# Patient Record
Sex: MALE | Race: WHITE | NOT HISPANIC OR LATINO | Employment: FULL TIME | URBAN - METROPOLITAN AREA
[De-identification: names, ages, dates, MRNs, and addresses within clinical notes are randomized per-mention and may not be internally consistent; named-entity substitution may affect disease eponyms.]

---

## 2022-12-01 ENCOUNTER — APPOINTMENT (EMERGENCY)
Dept: CT IMAGING | Facility: HOSPITAL | Age: 52
End: 2022-12-01

## 2022-12-01 ENCOUNTER — APPOINTMENT (EMERGENCY)
Dept: RADIOLOGY | Facility: HOSPITAL | Age: 52
End: 2022-12-01

## 2022-12-01 ENCOUNTER — HOSPITAL ENCOUNTER (INPATIENT)
Facility: HOSPITAL | Age: 52
LOS: 15 days | Discharge: HOME | End: 2022-12-16
Attending: EMERGENCY MEDICINE | Admitting: ANESTHESIOLOGY

## 2022-12-01 DIAGNOSIS — U07.1 ACUTE RESPIRATORY FAILURE DUE TO COVID-19 (HCC): ICD-10-CM

## 2022-12-01 DIAGNOSIS — J96.00 ACUTE RESPIRATORY FAILURE DUE TO COVID-19 (HCC): ICD-10-CM

## 2022-12-01 DIAGNOSIS — I27.20 PULMONARY HYPERTENSION (HCC): ICD-10-CM

## 2022-12-01 DIAGNOSIS — R09.02 HYPOXIA: Primary | ICD-10-CM

## 2022-12-01 DIAGNOSIS — R06.89 HYPERCAPNIA: ICD-10-CM

## 2022-12-01 DIAGNOSIS — I48.91 A-FIB (HCC): ICD-10-CM

## 2022-12-01 DIAGNOSIS — G47.33 OBSTRUCTIVE SLEEP APNEA: ICD-10-CM

## 2022-12-01 DIAGNOSIS — U07.1 COVID: ICD-10-CM

## 2022-12-01 PROBLEM — I50.9 CHF (CONGESTIVE HEART FAILURE) (HCC): Status: ACTIVE | Noted: 2022-12-01

## 2022-12-01 PROBLEM — J44.9 COPD MIXED TYPE (HCC): Status: ACTIVE | Noted: 2022-11-07

## 2022-12-01 PROBLEM — I10 HYPERTENSION, ESSENTIAL: Status: ACTIVE | Noted: 2017-08-20

## 2022-12-01 PROBLEM — R73.01 IMPAIRED FASTING GLUCOSE: Status: ACTIVE | Noted: 2020-06-24

## 2022-12-01 PROBLEM — E78.2 MIXED HYPERLIPIDEMIA: Status: ACTIVE | Noted: 2017-08-20

## 2022-12-01 PROBLEM — I51.7 LEFT VENTRICULAR HYPERTROPHY: Status: ACTIVE | Noted: 2021-12-20

## 2022-12-01 PROBLEM — R79.89 ELEVATED SERUM CREATININE: Status: ACTIVE | Noted: 2022-12-01

## 2022-12-01 LAB
2HR DELTA HS TROPONIN: 1 NG/L
4HR DELTA HS TROPONIN: 5 NG/L
ALBUMIN SERPL BCP-MCNC: 3.2 G/DL (ref 3.5–5)
ALP SERPL-CCNC: 68 U/L (ref 46–116)
ALT SERPL W P-5'-P-CCNC: 36 U/L (ref 12–78)
ANION GAP SERPL CALCULATED.3IONS-SCNC: 7 MMOL/L (ref 4–13)
APTT PPP: 22 SECONDS (ref 23–37)
AST SERPL W P-5'-P-CCNC: 24 U/L (ref 5–45)
BASOPHILS # BLD AUTO: 0.04 THOUSANDS/ÂΜL (ref 0–0.1)
BASOPHILS NFR BLD AUTO: 0 % (ref 0–1)
BILIRUB SERPL-MCNC: 0.57 MG/DL (ref 0.2–1)
BUN SERPL-MCNC: 37 MG/DL (ref 5–25)
CALCIUM ALBUM COR SERPL-MCNC: 9.3 MG/DL (ref 8.3–10.1)
CALCIUM SERPL-MCNC: 8.7 MG/DL (ref 8.3–10.1)
CARDIAC TROPONIN I PNL SERPL HS: 18 NG/L
CARDIAC TROPONIN I PNL SERPL HS: 19 NG/L
CARDIAC TROPONIN I PNL SERPL HS: 23 NG/L
CHLORIDE SERPL-SCNC: 105 MMOL/L (ref 96–108)
CO2 SERPL-SCNC: 33 MMOL/L (ref 21–32)
CREAT SERPL-MCNC: 1.39 MG/DL (ref 0.6–1.3)
D DIMER PPP FEU-MCNC: 3.5 UG/ML FEU
EOSINOPHIL # BLD AUTO: 0.2 THOUSAND/ÂΜL (ref 0–0.61)
EOSINOPHIL NFR BLD AUTO: 2 % (ref 0–6)
ERYTHROCYTE [DISTWIDTH] IN BLOOD BY AUTOMATED COUNT: 17.7 % (ref 11.6–15.1)
FLUAV RNA RESP QL NAA+PROBE: NEGATIVE
FLUBV RNA RESP QL NAA+PROBE: NEGATIVE
GFR SERPL CREATININE-BSD FRML MDRD: 57 ML/MIN/1.73SQ M
GLUCOSE SERPL-MCNC: 113 MG/DL (ref 65–140)
HCT VFR BLD AUTO: 52.3 % (ref 36.5–49.3)
HGB BLD-MCNC: 15.2 G/DL (ref 12–17)
IMM GRANULOCYTES # BLD AUTO: 0.02 THOUSAND/UL (ref 0–0.2)
IMM GRANULOCYTES NFR BLD AUTO: 0 % (ref 0–2)
INR PPP: 1.18 (ref 0.84–1.19)
LYMPHOCYTES # BLD AUTO: 2.01 THOUSANDS/ÂΜL (ref 0.6–4.47)
LYMPHOCYTES NFR BLD AUTO: 21 % (ref 14–44)
MCH RBC QN AUTO: 25.4 PG (ref 26.8–34.3)
MCHC RBC AUTO-ENTMCNC: 29.1 G/DL (ref 31.4–37.4)
MCV RBC AUTO: 87 FL (ref 82–98)
MONOCYTES # BLD AUTO: 1.12 THOUSAND/ÂΜL (ref 0.17–1.22)
MONOCYTES NFR BLD AUTO: 12 % (ref 4–12)
NEUTROPHILS # BLD AUTO: 6.11 THOUSANDS/ÂΜL (ref 1.85–7.62)
NEUTS SEG NFR BLD AUTO: 65 % (ref 43–75)
NRBC BLD AUTO-RTO: 0 /100 WBCS
PLATELET # BLD AUTO: 269 THOUSANDS/UL (ref 149–390)
PMV BLD AUTO: 9.5 FL (ref 8.9–12.7)
POTASSIUM SERPL-SCNC: 4.5 MMOL/L (ref 3.5–5.3)
PROT SERPL-MCNC: 7.1 G/DL (ref 6.4–8.4)
PROTHROMBIN TIME: 14.7 SECONDS (ref 11.6–14.5)
RBC # BLD AUTO: 5.99 MILLION/UL (ref 3.88–5.62)
RSV RNA RESP QL NAA+PROBE: NEGATIVE
SARS-COV-2 RNA RESP QL NAA+PROBE: POSITIVE
SODIUM SERPL-SCNC: 145 MMOL/L (ref 135–147)
WBC # BLD AUTO: 9.5 THOUSAND/UL (ref 4.31–10.16)

## 2022-12-01 PROCEDURE — XW0DXM6 INTRODUCTION OF BARICITINIB INTO MOUTH AND PHARYNX, EXTERNAL APPROACH, NEW TECHNOLOGY GROUP 6: ICD-10-PCS | Performed by: ANESTHESIOLOGY

## 2022-12-01 PROCEDURE — XW033E5 INTRODUCTION OF REMDESIVIR ANTI-INFECTIVE INTO PERIPHERAL VEIN, PERCUTANEOUS APPROACH, NEW TECHNOLOGY GROUP 5: ICD-10-PCS | Performed by: ANESTHESIOLOGY

## 2022-12-01 RX ORDER — ACETAMINOPHEN 325 MG/1
650 TABLET ORAL EVERY 6 HOURS PRN
Status: DISCONTINUED | OUTPATIENT
Start: 2022-12-01 | End: 2022-12-16 | Stop reason: HOSPADM

## 2022-12-01 RX ORDER — ALBUTEROL SULFATE 90 UG/1
2 AEROSOL, METERED RESPIRATORY (INHALATION) EVERY 4 HOURS PRN
Status: DISCONTINUED | OUTPATIENT
Start: 2022-12-01 | End: 2022-12-16 | Stop reason: HOSPADM

## 2022-12-01 RX ORDER — FUROSEMIDE 10 MG/ML
40 INJECTION INTRAMUSCULAR; INTRAVENOUS ONCE
Status: COMPLETED | OUTPATIENT
Start: 2022-12-01 | End: 2022-12-01

## 2022-12-01 RX ORDER — METOPROLOL SUCCINATE 25 MG/1
25 TABLET, EXTENDED RELEASE ORAL
Status: DISCONTINUED | OUTPATIENT
Start: 2022-12-02 | End: 2022-12-02

## 2022-12-01 RX ORDER — ALBUTEROL SULFATE 2.5 MG/3ML
5 SOLUTION RESPIRATORY (INHALATION) ONCE
Status: COMPLETED | OUTPATIENT
Start: 2022-12-01 | End: 2022-12-01

## 2022-12-01 RX ORDER — SILDENAFIL CITRATE 20 MG/1
20 TABLET ORAL 3 TIMES DAILY
Status: DISCONTINUED | OUTPATIENT
Start: 2022-12-02 | End: 2022-12-16 | Stop reason: HOSPADM

## 2022-12-01 RX ORDER — HYDRALAZINE HYDROCHLORIDE 20 MG/ML
5 INJECTION INTRAMUSCULAR; INTRAVENOUS EVERY 6 HOURS PRN
Status: DISCONTINUED | OUTPATIENT
Start: 2022-12-01 | End: 2022-12-01

## 2022-12-01 RX ORDER — GUAIFENESIN/DEXTROMETHORPHAN 100-10MG/5
10 SYRUP ORAL EVERY 4 HOURS PRN
Status: DISCONTINUED | OUTPATIENT
Start: 2022-12-01 | End: 2022-12-16 | Stop reason: HOSPADM

## 2022-12-01 RX ORDER — SILDENAFIL CITRATE 20 MG/1
20 TABLET ORAL 3 TIMES DAILY
COMMUNITY
Start: 2022-08-30 | End: 2023-08-30

## 2022-12-01 RX ORDER — PRAVASTATIN SODIUM 40 MG
40 TABLET ORAL
Status: DISCONTINUED | OUTPATIENT
Start: 2022-12-02 | End: 2022-12-02 | Stop reason: CLARIF

## 2022-12-01 RX ORDER — DEXAMETHASONE SODIUM PHOSPHATE 4 MG/ML
6 INJECTION, SOLUTION INTRA-ARTICULAR; INTRALESIONAL; INTRAMUSCULAR; INTRAVENOUS; SOFT TISSUE EVERY 24 HOURS
Status: DISCONTINUED | OUTPATIENT
Start: 2022-12-02 | End: 2022-12-02

## 2022-12-01 RX ORDER — LISINOPRIL AND HYDROCHLOROTHIAZIDE 25; 20 MG/1; MG/1
1 TABLET ORAL EVERY MORNING
COMMUNITY
Start: 2022-08-24 | End: 2022-12-16

## 2022-12-01 RX ORDER — FAMOTIDINE 40 MG/1
40 TABLET, FILM COATED ORAL
COMMUNITY
Start: 2022-08-22

## 2022-12-01 RX ORDER — ROSUVASTATIN CALCIUM 5 MG/1
5 TABLET, COATED ORAL DAILY
COMMUNITY
Start: 2022-08-24 | End: 2023-08-24

## 2022-12-01 RX ORDER — METOPROLOL SUCCINATE 25 MG/1
25 TABLET, EXTENDED RELEASE ORAL
COMMUNITY
Start: 2022-08-24 | End: 2022-12-16

## 2022-12-01 RX ORDER — FUROSEMIDE 20 MG/1
20 TABLET ORAL
COMMUNITY
Start: 2022-08-30 | End: 2022-12-16

## 2022-12-01 RX ORDER — MAGNESIUM HYDROXIDE/ALUMINUM HYDROXICE/SIMETHICONE 120; 1200; 1200 MG/30ML; MG/30ML; MG/30ML
30 SUSPENSION ORAL EVERY 6 HOURS PRN
Status: DISCONTINUED | OUTPATIENT
Start: 2022-12-01 | End: 2022-12-02

## 2022-12-01 RX ORDER — POTASSIUM CHLORIDE 750 MG/1
10 TABLET, FILM COATED, EXTENDED RELEASE ORAL DAILY
COMMUNITY
Start: 2022-08-22 | End: 2023-08-22

## 2022-12-01 RX ORDER — FUROSEMIDE 10 MG/ML
20 INJECTION INTRAMUSCULAR; INTRAVENOUS DAILY
Status: DISCONTINUED | OUTPATIENT
Start: 2022-12-02 | End: 2022-12-02

## 2022-12-01 RX ADMIN — IOHEXOL 100 ML: 350 INJECTION, SOLUTION INTRAVENOUS at 19:23

## 2022-12-01 RX ADMIN — ALBUTEROL SULFATE 5 MG: 2.5 SOLUTION RESPIRATORY (INHALATION) at 18:23

## 2022-12-01 RX ADMIN — FUROSEMIDE 40 MG: 10 INJECTION, SOLUTION INTRAMUSCULAR; INTRAVENOUS at 22:35

## 2022-12-01 RX ADMIN — IPRATROPIUM BROMIDE 0.5 MG: 0.5 SOLUTION RESPIRATORY (INHALATION) at 18:23

## 2022-12-01 RX ADMIN — SODIUM CHLORIDE 500 ML: 0.9 INJECTION, SOLUTION INTRAVENOUS at 18:24

## 2022-12-02 ENCOUNTER — APPOINTMENT (INPATIENT)
Dept: NON INVASIVE DIAGNOSTICS | Facility: HOSPITAL | Age: 52
End: 2022-12-02

## 2022-12-02 LAB
ANION GAP SERPL CALCULATED.3IONS-SCNC: 7 MMOL/L (ref 4–13)
APTT PPP: 28 SECONDS (ref 23–37)
APTT PPP: 32 SECONDS (ref 23–37)
APTT PPP: 36 SECONDS (ref 23–37)
ARTERIAL PATENCY WRIST A: YES
ATRIAL RATE: 127 BPM
ATRIAL RATE: 135 BPM
ATRIAL RATE: 136 BPM
ATRIAL RATE: 151 BPM
BASE EXCESS BLDA CALC-SCNC: 3.1 MMOL/L
BUN SERPL-MCNC: 33 MG/DL (ref 5–25)
CALCIUM SERPL-MCNC: 8.6 MG/DL (ref 8.3–10.1)
CHLORIDE SERPL-SCNC: 106 MMOL/L (ref 96–108)
CK SERPL-CCNC: 67 U/L (ref 39–308)
CO2 SERPL-SCNC: 33 MMOL/L (ref 21–32)
CREAT SERPL-MCNC: 1.48 MG/DL (ref 0.6–1.3)
CRP SERPL QL: 20.2 MG/L
E WAVE DECELERATION TIME: 49 MS
ERYTHROCYTE [DISTWIDTH] IN BLOOD BY AUTOMATED COUNT: 17.7 % (ref 11.6–15.1)
GFR SERPL CREATININE-BSD FRML MDRD: 53 ML/MIN/1.73SQ M
GLUCOSE SERPL-MCNC: 129 MG/DL (ref 65–140)
HCO3 BLDA-SCNC: 36 MMOL/L (ref 22–28)
HCT VFR BLD AUTO: 51.5 % (ref 36.5–49.3)
HGB BLD-MCNC: 14.4 G/DL (ref 12–17)
LAAS-AP4: 25.7 CM2
MAGNESIUM SERPL-MCNC: 2.2 MG/DL (ref 1.6–2.6)
MCH RBC QN AUTO: 24.6 PG (ref 26.8–34.3)
MCHC RBC AUTO-ENTMCNC: 28 G/DL (ref 31.4–37.4)
MCV RBC AUTO: 88 FL (ref 82–98)
MV PEAK A VEL: 1.16 M/S
MV PEAK E VEL: 92 CM/S
MV STENOSIS PRESSURE HALF TIME: 14 MS
MV VALVE AREA P 1/2 METHOD: 15.71 CM2
NASAL CANNULA: 15
NT-PROBNP SERPL-MCNC: 2061 PG/ML
O2 CT BLDA-SCNC: 20 ML/DL (ref 16–23)
OXYHGB MFR BLDA: 94.1 % (ref 94–97)
PCO2 BLDA: 105.1 MM HG (ref 36–44)
PH BLDA: 7.15 [PH] (ref 7.35–7.45)
PLATELET # BLD AUTO: 232 THOUSANDS/UL (ref 149–390)
PMV BLD AUTO: 9.1 FL (ref 8.9–12.7)
PO2 BLDA: 92.1 MM HG (ref 75–129)
POTASSIUM SERPL-SCNC: 5 MMOL/L (ref 3.5–5.3)
PR INTERVAL: 120 MS
PROCALCITONIN SERPL-MCNC: 0.11 NG/ML
QRS AXIS: -65 DEGREES
QRS AXIS: 172 DEGREES
QRS AXIS: 191 DEGREES
QRS AXIS: 198 DEGREES
QRSD INTERVAL: 142 MS
QRSD INTERVAL: 152 MS
QRSD INTERVAL: 152 MS
QRSD INTERVAL: 158 MS
QT INTERVAL: 318 MS
QT INTERVAL: 320 MS
QT INTERVAL: 326 MS
QT INTERVAL: 334 MS
QTC INTERVAL: 480 MS
QTC INTERVAL: 489 MS
QTC INTERVAL: 498 MS
QTC INTERVAL: 504 MS
RBC # BLD AUTO: 5.86 MILLION/UL (ref 3.88–5.62)
SODIUM SERPL-SCNC: 146 MMOL/L (ref 135–147)
SPECIMEN SOURCE: ABNORMAL
T WAVE AXIS: -30 DEGREES
T WAVE AXIS: -31 DEGREES
T WAVE AXIS: 43 DEGREES
T WAVE AXIS: 89 DEGREES
TR MAX PG: 41 MMHG
TR PEAK VELOCITY: 3.2 M/S
TRICUSPID VALVE PEAK REGURGITATION VELOCITY: 3.2 M/S
VENTRICULAR RATE: 134 BPM
VENTRICULAR RATE: 135 BPM
VENTRICULAR RATE: 135 BPM
VENTRICULAR RATE: 151 BPM
WBC # BLD AUTO: 10.64 THOUSAND/UL (ref 4.31–10.16)

## 2022-12-02 RX ORDER — HEPARIN SODIUM 1000 [USP'U]/ML
4000 INJECTION, SOLUTION INTRAVENOUS; SUBCUTANEOUS
Status: DISCONTINUED | OUTPATIENT
Start: 2022-12-02 | End: 2022-12-03

## 2022-12-02 RX ORDER — METOPROLOL TARTRATE 50 MG/1
50 TABLET, FILM COATED ORAL EVERY 12 HOURS SCHEDULED
Status: DISCONTINUED | OUTPATIENT
Start: 2022-12-02 | End: 2022-12-02

## 2022-12-02 RX ORDER — HEPARIN SODIUM 1000 [USP'U]/ML
2000 INJECTION, SOLUTION INTRAVENOUS; SUBCUTANEOUS
Status: DISCONTINUED | OUTPATIENT
Start: 2022-12-02 | End: 2022-12-03

## 2022-12-02 RX ORDER — HEPARIN SODIUM 1000 [USP'U]/ML
4000 INJECTION, SOLUTION INTRAVENOUS; SUBCUTANEOUS ONCE
Status: COMPLETED | OUTPATIENT
Start: 2022-12-02 | End: 2022-12-02

## 2022-12-02 RX ORDER — DOXYCYCLINE HYCLATE 100 MG/1
100 CAPSULE ORAL EVERY 12 HOURS SCHEDULED
Status: DISCONTINUED | OUTPATIENT
Start: 2022-12-02 | End: 2022-12-03

## 2022-12-02 RX ORDER — METOPROLOL TARTRATE 5 MG/5ML
10 INJECTION INTRAVENOUS ONCE
Status: COMPLETED | OUTPATIENT
Start: 2022-12-02 | End: 2022-12-02

## 2022-12-02 RX ORDER — METOPROLOL TARTRATE 50 MG/1
50 TABLET, FILM COATED ORAL EVERY 12 HOURS SCHEDULED
Status: DISCONTINUED | OUTPATIENT
Start: 2022-12-02 | End: 2022-12-03

## 2022-12-02 RX ORDER — DILTIAZEM HYDROCHLORIDE 5 MG/ML
15 INJECTION INTRAVENOUS ONCE
Status: DISCONTINUED | OUTPATIENT
Start: 2022-12-02 | End: 2022-12-02

## 2022-12-02 RX ORDER — ATORVASTATIN CALCIUM 10 MG/1
10 TABLET, FILM COATED ORAL
Status: DISCONTINUED | OUTPATIENT
Start: 2022-12-02 | End: 2022-12-16 | Stop reason: HOSPADM

## 2022-12-02 RX ORDER — FUROSEMIDE 10 MG/ML
40 INJECTION INTRAMUSCULAR; INTRAVENOUS
Status: DISCONTINUED | OUTPATIENT
Start: 2022-12-02 | End: 2022-12-05

## 2022-12-02 RX ORDER — HEPARIN SODIUM 10000 [USP'U]/100ML
3-30 INJECTION, SOLUTION INTRAVENOUS
Status: DISCONTINUED | OUTPATIENT
Start: 2022-12-02 | End: 2022-12-03

## 2022-12-02 RX ADMIN — FUROSEMIDE 20 MG: 10 INJECTION, SOLUTION INTRAMUSCULAR; INTRAVENOUS at 08:12

## 2022-12-02 RX ADMIN — METOROPROLOL TARTRATE 10 MG: 5 INJECTION, SOLUTION INTRAVENOUS at 17:07

## 2022-12-02 RX ADMIN — HEPARIN SODIUM 11.1 UNITS/KG/HR: 10000 INJECTION, SOLUTION INTRAVENOUS at 01:03

## 2022-12-02 RX ADMIN — HEPARIN SODIUM 4000 UNITS: 1000 INJECTION INTRAVENOUS; SUBCUTANEOUS at 01:03

## 2022-12-02 RX ADMIN — METOPROLOL SUCCINATE 25 MG: 25 TABLET, EXTENDED RELEASE ORAL at 00:49

## 2022-12-02 RX ADMIN — HEPARIN SODIUM 4000 UNITS: 1000 INJECTION INTRAVENOUS; SUBCUTANEOUS at 06:07

## 2022-12-02 RX ADMIN — HEPARIN SODIUM 4000 UNITS: 1000 INJECTION INTRAVENOUS; SUBCUTANEOUS at 14:22

## 2022-12-02 RX ADMIN — GUAIFENESIN AND DEXTROMETHORPHAN 10 ML: 100; 10 SYRUP ORAL at 17:24

## 2022-12-02 RX ADMIN — REMDESIVIR 200 MG: 100 INJECTION, POWDER, LYOPHILIZED, FOR SOLUTION INTRAVENOUS at 00:41

## 2022-12-02 RX ADMIN — DEXAMETHASONE SODIUM PHOSPHATE 6 MG: 4 INJECTION, SOLUTION INTRAMUSCULAR; INTRAVENOUS at 00:46

## 2022-12-02 RX ADMIN — ATORVASTATIN CALCIUM 10 MG: 10 TABLET, FILM COATED ORAL at 17:24

## 2022-12-02 RX ADMIN — METOPROLOL TARTRATE 50 MG: 50 TABLET, FILM COATED ORAL at 17:07

## 2022-12-02 RX ADMIN — CEFTRIAXONE SODIUM 1000 MG: 10 INJECTION, POWDER, FOR SOLUTION INTRAVENOUS at 08:11

## 2022-12-02 RX ADMIN — PERFLUTREN 0.9 ML/MIN: 6.52 INJECTION, SUSPENSION INTRAVENOUS at 15:00

## 2022-12-02 RX ADMIN — SILDENAFIL 20 MG: 20 TABLET ORAL at 00:49

## 2022-12-02 RX ADMIN — FUROSEMIDE 40 MG: 10 INJECTION, SOLUTION INTRAMUSCULAR; INTRAVENOUS at 17:24

## 2022-12-02 RX ADMIN — BARICITINIB 2 MG: 2 TABLET, FILM COATED ORAL at 01:08

## 2022-12-02 RX ADMIN — SILDENAFIL 20 MG: 20 TABLET ORAL at 17:41

## 2022-12-02 RX ADMIN — DEXAMETHASONE SODIUM PHOSPHATE 18.4 MG: 10 INJECTION, SOLUTION INTRAMUSCULAR; INTRAVENOUS at 23:40

## 2022-12-02 RX ADMIN — HEPARIN SODIUM 19.1 UNITS/KG/HR: 10000 INJECTION, SOLUTION INTRAVENOUS at 19:31

## 2022-12-02 RX ADMIN — CEFTRIAXONE SODIUM 2000 MG: 10 INJECTION, POWDER, FOR SOLUTION INTRAVENOUS at 23:36

## 2022-12-02 NOTE — ED NOTES
Pt O2 saturation in the 80s via monitor, in to assess pt  Pt laying flat in bed sleeping  This RN woke pt and sat him up in bed, changed bed linens and resituated pt in bed into a comfortable position  Pt asking to shower, have his clothing cleaned and to get dressed  This RN advised pt that at another time when he is feeling stronger we can reassess for shower use, pt agreeable  Pt sitting in bed, in no signs of distress, O2 saturation mid 90s  Lights dimmed, curtain closed and call bell within reach, will continue to monitor         Missy Bence, RN  72/48/42 [id number]

## 2022-12-02 NOTE — ASSESSMENT & PLAN NOTE
· Pulmonary hypertension noted on CTA chest, patient with history  · Continue home sildenafil t i d

## 2022-12-02 NOTE — UTILIZATION REVIEW
NOTIFICATION OF INPATIENT ADMISSION   AUTHORIZATION REQUEST   SERVICING FACILITY:   [address] [place] [address]  Tax ID: [id number]  NPI: [id number] ATTENDING PROVIDER:  Attending Name and NPI#: René Oliva [[id number]]  Address: [address] [place] [address]  Phone: [phone]     ADMISSION INFORMATION:  Place of Service: Inpatient [facility]  Place of Service Code: 21  Inpatient Admission Date/Time: 12/1/22  9:46 PM  Discharge Date/Time: No discharge date for patient encounter  Admitting Diagnosis Code/Description:  SOB (shortness of breath) [R06 02]     UTILIZATION REVIEW CONTACT:  Sotero Jeans, Utilization   Network Utilization Review Department  Phone: [phone]  Fax [phone]  Email: [email or web address]  Contact for approvals/pending authorizations, clinical reviews, and discharge  PHYSICIAN ADVISORY SERVICES:  Medical Necessity Denial & Wkxu-iq-Vis[facility] Review  Phone: [phone]  Fax: [phone]  Email: [email or web address]

## 2022-12-02 NOTE — ASSESSMENT & PLAN NOTE
· Creatinine currently 1 39, baseline around 1 in setting of COVID-19 infection verses possibility of onset of cardiorenal syndrome with CHF history  · Monitor BMP, avoid nephrotoxic agents  · For now will hold home HCTZ/lisinopril

## 2022-12-02 NOTE — ASSESSMENT & PLAN NOTE
+ Noted O2 saturation 70% on room air, was on high flow, currently on 12 L O2 by nasal cannula  · COVID-19 PCR + 12/1/2022  · CT PE protocol consistent with cardiomegaly/right lower lobe pneumonia/pulmonary hypertension  No PE   · CRP 20 2 -><3  · ddimer 3 5 - >0 69  · S/p IV Decadron  · S/p remdesivir  · On baricitinib [day 12/14]  · Initially on heparin drip - > eliquis  · Procalcitonin x2 negative

## 2022-12-02 NOTE — H&P
[facility]  H&P- Julisa Barberton 1970, 46 y o  male MRN: [id number]  Unit/Bed#: ED 21 Encounter: [id number]  Primary Care Provider: No primary care provider on file  Date and time admitted to hospital: 12/1/2022  5:52 PM    * Acute respiratory failure due to COVID-19 [facility])  Assessment & Plan  · Noted O2 sat in 70s on room air, currently saturating 94% on 6 L nasal cannula, attempt to wean as tolerated  · Patient reports history of initial 2 dose vaccine and 1 booster  · Due to requiring 6 L nasal can will place on moderate COVID pathway  · Start IV dexamethasone 6 mg daily, IV remdesivir daily, p o  Baricitinib 2 mg daily, patient in agreement  · CT a negative for PE as D-dimer elevated at 3 50  · Based on COVID pathway will initiate on IV heparin drip with low ACS protocol  · Check sputum culture and Gram stain as CT a revealing atelectasis versus pneumonia, currently not meeting sepsis criteria so will hold off on antibiotic    CHF (congestive heart failure) ([facility] [address] )  Assessment & Plan  Wt Readings from Last 3 Encounters:   12/01/22 (!) 184 kg (406 lb)   · Patient denies any worsening lower extremity edema which is noted on exam, CTA chest revealing right lower lobe atelectasis versus pneumonia with small right pleural effusion and cardiomegaly  · Most recent echocardiogram from December 2021 at [facility] revealing 60-65% ejection fraction, normal systolic function  · Will recheck echocardiogram  · Hold home p o   Lasix 20 mg every other day  · Start IV Lasix 20 mg daily  · Intake and output monitoring, daily weights, low-sodium diet with 1500 mL fluid restriction          Elevated serum creatinine  Assessment & Plan  · Creatinine currently 1 39, baseline around 1 in setting of COVID-19 infection verses possibility of onset of cardiorenal syndrome with CHF history  · Monitor BMP, avoid nephrotoxic agents  · For now will hold home HCTZ/lisinopril    Pulmonary hypertension ([address] )  Assessment & Plan  · Pulmonary hypertension noted on CTA chest, patient with history  · Continue home sildenafil t i d       VTE Pharmacologic Prophylaxis: VTE Score: 8 High Risk (Score >/= 5) - Pharmacological DVT Prophylaxis Ordered: heparin drip  Sequential Compression Devices Ordered  Code Status: Level 1 - Full Code per pt  Discussion with family: pt  Anticipated Length of Stay: Patient will be admitted on an inpatient basis with an anticipated length of stay of greater than 2 midnights secondary to see above  Total Time for Visit, including Counseling / Coordination of Care: 60 minutes Greater than 50% of this total time spent on direct patient counseling and coordination of care  Chief Complaint:    Chief Complaint   Patient presents with   • Shortness of Breath     SOB x4 days pt to  today and covid rlue out and with ghulam pneumonia noted on chest xray  O2 sat on room air 72 and increased to low to mid 90's on nasal cannula       History of Present Illness:  Mekhi Simmons is a 46 y o  male with a PMH of COPD, morbid obesity, BISHNU, pulmonary hypertension, CHF who presents with complaint of gradual worsening of shortness of breath that patient reports has been going on “for long time for months ” However, he reports a brought to the hospital now is over the last couple of days shortness of breath has been worse and he was also noted to be COVID positive at urgent care today  Other than shortness of breath patient denies any other symptoms including chest pain, cough, congestion, dizziness, headache, abdominal pain, diarrhea  Patient reports his main concern is he would like to lose weight and speak to a bariatric surgeon  Encourage patient to follow up with PCP for referral upon discharge  Review of Systems:  Review of Systems   Constitutional: Negative for activity change, chills, fatigue and fever  HENT: Negative for congestion      Respiratory: Positive for shortness of breath  Negative for cough  Cardiovascular: Negative for chest pain  Gastrointestinal: Negative for abdominal pain, diarrhea and nausea  Neurological: Negative for dizziness and headaches  Past Medical and Surgical History:   Past Medical History:   Diagnosis Date   • CHF (congestive heart failure) ([facility] )    • Hypertension    • Sleep apnea        History reviewed  No pertinent surgical history  Meds/Allergies:  Prior to Admission medications    Medication Sig Start Date End Date Taking? Authorizing Provider   famotidine (PEPCID) 40 MG tablet Take 40 mg by mouth 8/22/22  Yes Historical Provider, MD   furosemide (LASIX) 20 mg tablet Take 20 mg by mouth 8/30/22 8/30/23 Yes Historical Provider, MD   lisinopril-hydrochlorothiazide (PRINZIDE,ZESTORETIC) 20-25 MG per tablet Take 1 tablet by mouth every morning 8/24/22 8/24/23 Yes Historical Provider, MD   metoprolol succinate (TOPROL-XL) 25 mg 24 hr tablet Take 25 mg by mouth 8/24/22 8/24/23 Yes Historical Provider, MD   potassium chloride (Klor-Con) 10 mEq tablet Take 10 mEq by mouth daily 8/22/22 8/22/23 Yes Historical Provider, MD   rosuvastatin (CRESTOR) 5 mg tablet Take 5 mg by mouth daily 8/24/22 8/24/23 Yes Historical Provider, MD   sildenafil (REVATIO) 20 mg tablet Take 20 mg by mouth Three times a day 8/30/22 8/30/23 Yes Historical Provider, MD     Have reviewed home medications per review of chart       Allergies: No Known Allergies    Social History:  Marital Status: /Civil Union     Patient Pre-hospital Living Situation: Home  Patient Pre-hospital Level of Mobility: walks  Patient Pre-hospital Diet Restrictions: cardiac  Substance Use History:   Social History     Substance and Sexual Activity   Alcohol Use Not Currently     Social History     Tobacco Use   Smoking Status Former   • Types: Cigarettes   Smokeless Tobacco Never     Social History     Substance and Sexual Activity   Drug Use Not Currently       Family History:  History reviewed  No pertinent family history  Physical Exam:     Vitals:   Blood Pressure: 131/94 (12/01/22 2330)  Pulse: (!) 135 (12/01/22 2330)  Temperature: 99 °F (37 2 °C) (12/01/22 1845)  Temp Source: Oral (12/01/22 1845)  Respirations: 18 (12/01/22 2330)  Height: 5' 11" (180 3 cm) (12/01/22 1800)  Weight - Scale: (!) 184 kg (406 lb) (12/01/22 1800)  SpO2: 90 % (12/01/22 2330)    Physical Exam  Vitals and nursing note reviewed  Constitutional:       General: He is not in acute distress  Appearance: He is obese  He is not diaphoretic  HENT:      Head: Normocephalic  Cardiovascular:      Rate and Rhythm: Regular rhythm  Tachycardia present  Heart sounds: Normal heart sounds  Pulmonary:      Effort: Pulmonary effort is normal  No respiratory distress  Breath sounds: No stridor  Examination of the right-lower field reveals decreased breath sounds  Examination of the left-lower field reveals decreased breath sounds  Decreased breath sounds present  No wheezing or rales  Abdominal:      General: Abdomen is flat  Bowel sounds are normal       Palpations: Abdomen is soft  Tenderness: There is no abdominal tenderness  Musculoskeletal:         General: No tenderness  Right lower leg: Edema present  Left lower leg: Edema present  Skin:     General: Skin is warm and dry  Neurological:      General: No focal deficit present  Mental Status: He is alert and oriented to person, place, and time     Psychiatric:         Mood and Affect: Mood normal          Behavior: Behavior normal           Additional Data:     Lab Results:  Results from last 7 days   Lab Units 12/01/22  1822   WBC Thousand/uL 9 50   HEMOGLOBIN g/dL 15 2   HEMATOCRIT % 52 3*   PLATELETS Thousands/uL 269   NEUTROS PCT % 65   LYMPHS PCT % 21   MONOS PCT % 12   EOS PCT % 2     Results from last 7 days   Lab Units 12/01/22  1822   SODIUM mmol/L 145   POTASSIUM mmol/L 4 5   CHLORIDE mmol/L 105   CO2 mmol/L 33*   BUN mg/dL 37*   CREATININE mg/dL 1 39*   ANION GAP mmol/L 7   CALCIUM mg/dL 8 7   ALBUMIN g/dL 3 2*   TOTAL BILIRUBIN mg/dL 0 57   ALK PHOS U/L 68   ALT U/L 36   AST U/L 24   GLUCOSE RANDOM mg/dL 113     Results from last 7 days   Lab Units 12/01/22  2238   INR  1 18                   Lines/Drains:  Invasive Devices     Peripheral Intravenous Line  Duration           Peripheral IV 12/01/22 Left Antecubital <1 day                    Imaging: Reviewed radiology reports from this admission including: chest CT scan  CTA ED chest PE Study   Final Result by Tamy Mendez MD (12/01 2118)         1  Mild to moderate cardiomegaly with small right pleural effusion  2  Enlarged pulmonary artery suggesting pulmonary hypertension  3  Subsegmental atelectasis versus pneumonia right lower lobe  4  No evidence of pulmonary embolism given limitations  5  Small perihepatic ascites  Workstation performed: [id number]         XR chest 1 view portable    (Results Pending)       EKG and Other Studies Reviewed on Admission:   · EKG: Wide QRS tachycardia, right bundle branch block, prolonged QTC four hundred ninety-eight  ** Please Note: This note has been constructed using a voice recognition system   **

## 2022-12-02 NOTE — RESPIRATORY THERAPY NOTE
12/02/22 1411   Respiratory Assessment   Resp Comments Pt remains on [address] [facility] at this time  No changes made  Will wean when able     O2 Device MFNC   Oxygen Therapy/Pulse Ox   O2 Device Mid flow nasal cannula   O2 Therapy Oxygen humidified   SpO2 94 %   SpO2 Activity At Rest   $ Pulse Oximetry Spot Check Charge Completed

## 2022-12-02 NOTE — ED NOTES
SLIM notified of pts oxygen saturation and O2 requirements  Respiratory called to bedside to reevaluate pt per WILLY Zheng RN  44/27/14 [id number]

## 2022-12-02 NOTE — RESPIRATORY THERAPY NOTE
RT Protocol Note  Charu Kirk 46 y o  male MRN: [id number]  Unit/Bed#: ED 21 Encounter: [id number]    Assessment    Principal Problem:    Acute respiratory failure due to COVID-19 [facility])  Active Problems:    CHF (congestive heart failure) ([address] )    Pulmonary hypertension (HCC)    Elevated serum creatinine      Home Pulmonary Medications:     12/02/22 0008   Respiratory Assessment   Assessment Type Assess only   General Appearance Alert; Awake   Respiratory Pattern Labored   Chest Assessment Chest expansion symmetrical   Bilateral Breath Sounds Diminished   Cough Non-productive   Resp Comments Pt placed on [address] at this time   O2 Device MFNC   Oxygen Therapy/Pulse Ox   O2 Device Mid flow nasal cannula   O2 Therapy Oxygen humidified   Nasal Cannula O2 Flow Rate (L/min) 8 L/min   Calculated FIO2 (%) - Nasal Cannula 52   FiO2 (%) 52   O2 Flow Rate (L/min) 8 L/min   Safety Instructions Yes (Comment)   SpO2 92 %   SpO2 Activity At Rest   $ Pulse Oximetry Spot Check Charge Completed          Past Medical History:   Diagnosis Date    CHF (congestive heart failure) (HCC)     Hypertension     Sleep apnea      Social History     Socioeconomic History    Marital status: /Civil Union     Spouse name: None    Number of children: None    Years of education: None    Highest education level: None   Occupational History    None   Tobacco Use    Smoking status: Former     Types: Cigarettes    Smokeless tobacco: Never   Substance and Sexual Activity    Alcohol use: Not Currently    Drug use: Not Currently    Sexual activity: None   Other Topics Concern    None   Social History Narrative    None     Social Determinants of Health     Financial Resource Strain: Not on file   Food Insecurity: Not on file   Transportation Needs: Not on file   Physical Activity: Not on file   Stress: Not on file   Social Connections: Not on file   Intimate Partner Violence: Not on file   Housing Stability: Not on file       Subjective Objective    Physical Exam:   Assessment Type: Assess only  General Appearance: Alert, Awake  Respiratory Pattern: Labored  Chest Assessment: Chest expansion symmetrical  Bilateral Breath Sounds: Diminished  Cough: Non-productive  O2 Device: [address]    Vitals:  Blood pressure 131/94, pulse (!) 135, temperature 99 °F (37 2 °C), temperature source Oral, resp  rate 18, height 5' 11" (1 803 m), weight (!) 184 kg (406 lb), SpO2 92 %  Imaging and other studies: I have personally reviewed pertinent reports        O2 Device: [address]     Plan             Resp Comments: Pt placed on [address] at this time

## 2022-12-02 NOTE — RESPIRATORY THERAPY NOTE
RT Protocol Note  Mira Cazares 46 y o  male MRN: [id number]  Unit/Bed#: ED 21 Encounter: [id number]    Assessment    Principal Problem:    Acute respiratory failure due to COVID-19 [facility])  Active Problems:    CHF (congestive heart failure) (HCC)    Pulmonary hypertension (HCC)    Elevated serum creatinine      Home Pulmonary Medications:  None    Home Devices/Therapy: (P) BiPAP/CPAP, Home O2    Past Medical History:   Diagnosis Date    CHF (congestive heart failure) (HCC)     Hypertension     Sleep apnea      Social History     Socioeconomic History    Marital status: /Civil Union     Spouse name: None    Number of children: None    Years of education: None    Highest education level: None   Occupational History    None   Tobacco Use    Smoking status: Former     Types: Cigarettes    Smokeless tobacco: Never   Substance and Sexual Activity    Alcohol use: Not Currently    Drug use: Not Currently    Sexual activity: None   Other Topics Concern    None   Social History Narrative    None     Social Determinants of Health     Financial Resource Strain: Not on file   Food Insecurity: Not on file   Transportation Needs: Not on file   Physical Activity: Not on file   Stress: Not on file   Social Connections: Not on file   Intimate Partner Violence: Not on file   Housing Stability: Not on file       Subjective         Objective    Physical Exam:   Assessment Type: Assess only  General Appearance: Alert, Awake  Respiratory Pattern: Labored, Tachypneic  Chest Assessment: Chest expansion symmetrical  Bilateral Breath Sounds: Diminished  Cough: None  O2 Device: [address]    Vitals:  Blood pressure 132/90, pulse (!) 134, temperature 99 °F (37 2 °C), temperature source Oral, resp  rate (P) 20, height 5' 11" (1 803 m), weight (!) 184 kg (406 lb), SpO2 (P) 92 %  Imaging and other studies: I have personally reviewed pertinent reports        O2 Device: [address]     Plan    Respiratory Plan: (P) No distress/Pulmonary history        Resp Comments: Pt remains on [address] [facility]  No changes made at this time  Will wean as able  Pt states he has a CPAP and O2 at home  Uses no inhalers or nebs

## 2022-12-02 NOTE — UTILIZATION REVIEW
Initial Clinical Review    Admission: Date/Time/Statement:   Admission Orders (From admission, onward)     Ordered        12/01/22 2146  INPATIENT ADMISSION  Once                      Orders Placed This Encounter   Procedures   • INPATIENT ADMISSION     Standing Status:   Standing     Number of Occurrences:   1     Order Specific Question:   Level of Care     Answer:   Med Surg [16]     Order Specific Question:   Estimated length of stay     Answer:   More than 2 Midnights     Order Specific Question:   Certification     Answer:   I certify that inpatient services are medically necessary for this patient for a duration of greater than two midnights  See H&P and MD Progress Notes for additional information about the patient's course of treatment  ED Arrival Information     Expected   -    Arrival   12/1/2022 17:52    Acuity   Emergent            Means of arrival   Ambulance    Escorted by   [facility]    Admission type   Emergency            Arrival complaint   sob           Chief Complaint   Patient presents with   • Shortness of Breath     SOB x4 days pt to [facility] today and covid rlue out and with ghulam pneumonia noted on chest xray  O2 sat on room air 72 and increased to low to mid 90's on nasal cannula       Initial Presentation: 46 y o  male  To ED from home via EMS w/ gradual worsening of shortness of breath that patient reports has been going on “for long time for months ”  SOB worse over the last few days   O2 sat 70s on RA , O2 94 % on 5l attempt to wean as porter   In ED tested + COVID   Admitted IP status for acute resp failure d/t COVID   Plan to Start iv dexamethasone , iv remdesivir and baricitinib   D-dimer elevated 3 50   Check sputum cx and gram statin   CHF check echo , hold home lasix , start IV lasix , I&O , weights, low Na diet , 1500 ml fld restriction   CR 1 36 , baseline 1 monitor BMP , hold HCTZ /lisinoprill        PE: BLE edema , dec breath sounds     Date: 12/2 Day 2: + tachypneic , BS diminished   Cont to have tachycardia   On 15 l Midflow to keep O2 sat >92-94 %   Cont IV abx , IV lasix and heparin gtt        ED Triage Vitals   Temperature Pulse Respirations Blood Pressure SpO2   12/01/22 1800 12/01/22 1800 12/01/22 1800 12/01/22 1800 12/01/22 1800   98 7 °F (37 1 °C) (!) 150 18 (!) 156/116 96 %      Temp Source Heart Rate Source Patient Position - Orthostatic VS BP Location FiO2 (%)   12/01/22 1800 12/01/22 1800 12/01/22 1800 12/01/22 1800 12/02/22 0008   Oral Monitor Sitting Right arm 52      Pain Score       --                 Wt Readings from Last 1 Encounters:   12/01/22 (!) 184 kg (406 lb)     Additional Vital Signs:   12/02/22 0818 -- 134 Abnormal  20 -- -- 92 % -- 80 -- 15 L/min Mid flow nasal cannula --   12/02/22 0807 -- 130 Abnormal  20 132/90 107 92 % -- -- -- -- None (Room air) Lying   12/02/22 0630 -- 132 Abnormal  -- -- -- 94 % -- -- -- -- Mid flow nasal cannula --   12/02/22 0200 -- 132 Abnormal  22 109/81 88 90 % 60 60 10 L/min 10 L/min Mid flow nasal cannula Lying   12/02/22 0145 -- 133 Abnormal  24 Abnormal  135/86 103 94 % -- -- -- -- Mid flow nasal cannula --   12/02/22 0130 -- 133 Abnormal  31 Abnormal  -- -- 91 % -- -- -- -- -- --   12/02/22 0100 -- 133 Abnormal  26 Abnormal  148/107 Abnormal  123 96 % -- -- -- -- None (Room air) Lying   12/02/22 0049 -- 135 Abnormal  -- 142/98 -- -- -- -- -- -- -- --   12/02/22 0030 -- 167 Abnormal  22 142/98 114 92 % -- -- -- -- Mid flow nasal cannula --   12/02/22 0008 -- -- -- -- -- 92 % 52 52 8 L/min 8 L/min Mid flow nasal cannula --   12/01/22 2330 -- 135 Abnormal  18 131/94 108 90 % -- -- -- -- None (Room air) Lying   12/01/22 2300 -- 134 Abnormal  23 Abnormal  156/93 115 90 % -- 36 -- 4 L/min Nasal cannula Lying   12/01/22 2230 -- 133 Abnormal  14 164/105 Abnormal  129 93 % -- 36 -- 4 L/min Nasal cannula --   12/01/22 2200 -- 133 Abnormal  14 155/106 Abnormal  126 89 % Abnormal  -- 36 -- 4 L/min Nasal cannula Lying   12/01/22 2030 -- 134 Abnormal  19 137/93 110 89 % Abnormal  -- 36 -- 4 L/min Nasal cannula Lying   12/01/22 2000 -- 133 Abnormal  15 135/95 111 94 % -- 36 -- 4 L/min Nasal cannula Lying   12/01/22 1845 99 °F (37 2 °C) 135 Abnormal  14 130/74 80 96 % -- 36 -- 4 L/min Nasal cannula --   12/01/22 1813 -- -- -- -- -- -- -- -- -- -- Nasal cannula --   12/01/22 1803 -- -- -- -- -- 72 % Abnormal   -- -- -- -- -- --   SpO2: 72 room air increased to 93 4lnc at 12/01/22 1803       Pertinent Labs/Diagnostic Test Results:   12/2 Echo - pending   CTA ED chest PE Study   Final Result by Bo Skiff, MD (12/01 2118)         1  Mild to moderate cardiomegaly with small right pleural effusion  2  Enlarged pulmonary artery suggesting pulmonary hypertension  3  Subsegmental atelectasis versus pneumonia right lower lobe  4  No evidence of pulmonary embolism given limitations  5  Small perihepatic ascites  Workstation performed: [id number]         XR chest 1 view portable   Final Result by Gino Pandya MD (12/02 0825)      Cardiomegaly   Hazy density in the lungs with increased lung markings suggest congestion    Small left effusion                  Workstation performed: [id number]           Results from last 7 days   Lab Units 12/01/22  1857   SARS-COV-2  Positive*     Results from last 7 days   Lab Units 12/02/22  0448 12/01/22  1822   WBC Thousand/uL 10 64* 9 50   HEMOGLOBIN g/dL 14 4 15 2   HEMATOCRIT % 51 5* 52 3*   PLATELETS Thousands/uL 232 269   NEUTROS ABS Thousands/µL  --  6 11     Results from last 7 days   Lab Units 12/02/22  0448 12/01/22  1822   SODIUM mmol/L 146 145   POTASSIUM mmol/L 5 0 4 5   CHLORIDE mmol/L 106 105   CO2 mmol/L 33* 33*   ANION GAP mmol/L 7 7   BUN mg/dL 33* 37*   CREATININE mg/dL 1 48* 1 39*   EGFR ml/min/1 73sq m 53 57   CALCIUM mg/dL 8 6 8 7   MAGNESIUM mg/dL  --  2 2     Results from last 7 days   Lab Units 12/01/22  1822   AST U/L 24   ALT U/L 36   ALK PHOS U/L 68   TOTAL PROTEIN g/dL 7 1   ALBUMIN g/dL 3 2*   TOTAL BILIRUBIN mg/dL 0 57     Results from last 7 days   Lab Units 12/02/22  0448 12/01/22  1822   GLUCOSE RANDOM mg/dL 129 113     Results from last 7 days   Lab Units 12/01/22  1822   CK TOTAL U/L 67     Results from last 7 days   Lab Units 12/01/22  2238 12/01/22 2026 12/01/22  1822   HS TNI 0HR ng/L  --   --  18   HS TNI 2HR ng/L  --  19  --    HSTNI D2 ng/L  --  1  --    HS TNI 4HR ng/L 23  --   --    HSTNI D4 ng/L 5  --   --      Results from last 7 days   Lab Units 12/01/22  1822   D-DIMER QUANTITATIVE ug/ml FEU 3 50*     Results from last 7 days   Lab Units 12/02/22  0532 12/01/22  2238   PROTIME seconds  --  14 7*   INR   --  1 18   PTT seconds 28 22*     Results from last 7 days   Lab Units 12/01/22  1822   NT-PRO BNP pg/mL 2,061*     Results from last 7 days   Lab Units 12/01/22  1822   CRP mg/L 20 2*     Results from last 7 days   Lab Units 12/01/22  1857   INFLUENZA A PCR  Negative   INFLUENZA B PCR  Negative   RSV PCR  Negative       ED Treatment:   Medication Administration from 12/01/2022 1752 to 12/02/2022 1037       Date/Time Order Dose Route Action     12/01/2022 1823 EST albuterol inhalation solution 5 mg 5 mg Nebulization Given     12/01/2022 1823 EST ipratropium (ATROVENT) 0 02 % inhalation solution 0 5 mg 0 5 mg Nebulization Given     12/01/2022 1824 EST sodium chloride 0 9 % bolus 500 mL 500 mL Intravenous New Bag     12/01/2022 2235 EST furosemide (LASIX) injection 40 mg 40 mg Intravenous Given     12/02/2022 0049 EST metoprolol succinate (TOPROL-XL) 24 hr tablet 25 mg 25 mg Oral Given     12/02/2022 0049 EST sildenafil (REVATIO) tablet 20 mg 20 mg Oral Given     12/02/2022 7441 EST furosemide (LASIX) injection 20 mg 20 mg Intravenous Given     12/02/2022 0046 EST dexamethasone (DECADRON) injection 6 mg 6 mg Intravenous Given     12/02/2022 0108 EST baricitinib (OLUMIANT) tablet 2 mg 2 mg Oral Given     12/02/2022 0041 EST remdesivir Kevinjacob Robs) 200 mg in sodium chloride 0 9 % 290 mL IVPB 200 mg Intravenous Given     12/02/2022 0103 EST heparin (porcine) injection 4,000 Units 4,000 Units Intravenous Given     12/02/2022 1245 EST heparin (porcine) 25,000 units in 0 45% NaCl 250 mL infusion (premix) 15 1 Units/kg/hr Intravenous Rate/Dose Change     12/02/2022 0103 EST heparin (porcine) 25,000 units in 0 45% NaCl 250 mL infusion (premix) 11 1 Units/kg/hr Intravenous New Bag     12/02/2022 0607 EST heparin (porcine) injection 4,000 Units 4,000 Units Intravenous Given     12/02/2022 0811 EST ceftriaxone (ROCEPHIN) 1 g/50 mL in dextrose IVPB 1,000 mg Intravenous New Bag        Past Medical History:   Diagnosis Date   • CHF (congestive heart failure) ([facility])    • Hypertension    • Sleep apnea      Present on Admission:  • Acute respiratory failure due to COVID-19 ([facility])  • Elevated serum creatinine  • Pulmonary hypertension ([facility])      Admitting Diagnosis: SOB (shortness of breath) [R06 02]  Age/Sex: 46 y o  male  Admission Orders:  Scheduled Medications:  atorvastatin, 10 mg, Oral, Daily With Dinner  baricitinib, 2 mg, Oral, Q24H  cefTRIAXone, 1,000 mg, Intravenous, Q24H  dexamethasone, 6 mg, Intravenous, Q24H  furosemide, 20 mg, Intravenous, Daily  metoprolol succinate, 25 mg, Oral, HS  [START ON 12/3/2022] remdesivir, 100 mg, Intravenous, Q24H  sildenafil, 20 mg, Oral, TID      Continuous IV Infusions:  heparin (porcine), 3-20 Units/kg/hr (Order-Specific), Intravenous, Titrated      PRN Meds:  acetaminophen, 650 mg, Oral, Q6H PRN  albuterol, 2 puff, Inhalation, Q4H PRN  aluminum-magnesium hydroxide-simethicone, 30 mL, Oral, Q6H PRN  dextromethorphan-guaiFENesin, 10 mL, Oral, Q4H PRN  heparin (porcine), 2,000 Units, Intravenous, Q1H PRN  heparin (porcine), 4,000 Units, Intravenous, Q1H PRN    Fld restriction   Keep O2 sat >89 %   Act as porter   Self proning       Network Utilization Review Department  ATTENTION: Please call with any questions or concerns to [phone] and carefully listen to the prompts so that you are directed to the right person  All voicemails are confidential   Vinnie Dalton all requests for admission clinical reviews, approved or denied determinations and any other requests to dedicated fax number below belonging to the campus where the patient is receiving treatment   List of dedicated fax numbers for the Facilities:  [address] DENIALS (Administrative/Medical Necessity) [phone]   [address] (Maternity/NICU/Pediatrics) [phone]   [address] Trin[address] Stone [phone]   [facility] 77 [phone]   [address] [place] [address] Oscar Fisher 28 [phone]   1554 [facility] Ghassan Menchaca [facility] 134 815 [facility] [phone]

## 2022-12-02 NOTE — ASSESSMENT & PLAN NOTE
Wt Readings from Last 3 Encounters:   12/01/22 (!) 184 kg (406 lb)   · Patient denies any worsening lower extremity edema which is noted on exam, CTA chest revealing right lower lobe atelectasis versus pneumonia with small right pleural effusion and cardiomegaly  · Most recent echocardiogram from December 2021 at [facility] revealing 60-65% ejection fraction, normal systolic function  · Will recheck echocardiogram  · Hold home p o   Lasix 20 mg every other day  · Start IV Lasix 20 mg daily  · Intake and output monitoring, daily weights, low-sodium diet with 1500 mL fluid restriction

## 2022-12-02 NOTE — ASSESSMENT & PLAN NOTE
· Noted O2 sat in 70s on room air, currently saturating 94% on 6 L nasal cannula, attempt to wean as tolerated  · Patient reports history of initial 2 dose vaccine and 1 booster  · Due to requiring 6 L nasal can will place on moderate COVID pathway  · Start IV dexamethasone 6 mg daily, IV remdesivir daily, p o   Baricitinib 2 mg daily, patient in agreement  · CT a negative for PE as D-dimer elevated at 3 50  · Based on COVID pathway will initiate on IV heparin drip with low ACS protocol  · Check sputum culture and Gram stain as CT a revealing atelectasis versus pneumonia, currently not meeting sepsis criteria so will hold off on antibiotic

## 2022-12-03 LAB
ALBUMIN SERPL BCP-MCNC: 3 G/DL (ref 3.5–5)
ALP SERPL-CCNC: 54 U/L (ref 46–116)
ALT SERPL W P-5'-P-CCNC: 28 U/L (ref 12–78)
ANION GAP SERPL CALCULATED.3IONS-SCNC: 6 MMOL/L (ref 4–13)
ANION GAP SERPL CALCULATED.3IONS-SCNC: 6 MMOL/L (ref 4–13)
ANION GAP SERPL CALCULATED.3IONS-SCNC: 7 MMOL/L (ref 4–13)
APTT PPP: 62 SECONDS (ref 23–37)
APTT PPP: 69 SECONDS (ref 23–37)
ARTERIAL PATENCY WRIST A: YES
AST SERPL W P-5'-P-CCNC: 22 U/L (ref 5–45)
BASE EX.OXY STD BLDV CALC-SCNC: 89.5 % (ref 60–80)
BASE EXCESS BLDA CALC-SCNC: 6.5 MMOL/L
BASE EXCESS BLDA CALC-SCNC: 6.5 MMOL/L
BASE EXCESS BLDA CALC-SCNC: 6.8 MMOL/L
BASE EXCESS BLDA CALC-SCNC: 8.2 MMOL/L
BASE EXCESS BLDA CALC-SCNC: 9 MMOL/L (ref -2–3)
BASE EXCESS BLDA CALC-SCNC: 9.9 MMOL/L
BASE EXCESS BLDV CALC-SCNC: 10.2 MMOL/L
BASOPHILS # BLD AUTO: 0.01 THOUSANDS/ÂΜL (ref 0–0.1)
BASOPHILS NFR BLD AUTO: 0 % (ref 0–1)
BILIRUB SERPL-MCNC: 0.5 MG/DL (ref 0.2–1)
BUN SERPL-MCNC: 32 MG/DL (ref 5–25)
BUN SERPL-MCNC: 33 MG/DL (ref 5–25)
BUN SERPL-MCNC: 36 MG/DL (ref 5–25)
CA-I BLD-SCNC: 1.24 MMOL/L (ref 1.12–1.32)
CA-I BLD-SCNC: 1.26 MMOL/L (ref 1.12–1.32)
CA-I BLD-SCNC: 1.26 MMOL/L (ref 1.12–1.32)
CALCIUM ALBUM COR SERPL-MCNC: 9.2 MG/DL (ref 8.3–10.1)
CALCIUM SERPL-MCNC: 8.4 MG/DL (ref 8.3–10.1)
CALCIUM SERPL-MCNC: 8.6 MG/DL (ref 8.3–10.1)
CALCIUM SERPL-MCNC: 8.9 MG/DL (ref 8.3–10.1)
CHLORIDE SERPL-SCNC: 102 MMOL/L (ref 96–108)
CHLORIDE SERPL-SCNC: 103 MMOL/L (ref 96–108)
CHLORIDE SERPL-SCNC: 104 MMOL/L (ref 96–108)
CO2 SERPL-SCNC: 36 MMOL/L (ref 21–32)
CO2 SERPL-SCNC: 37 MMOL/L (ref 21–32)
CO2 SERPL-SCNC: 37 MMOL/L (ref 21–32)
CREAT SERPL-MCNC: 1.31 MG/DL (ref 0.6–1.3)
CREAT SERPL-MCNC: 1.34 MG/DL (ref 0.6–1.3)
CREAT SERPL-MCNC: 1.37 MG/DL (ref 0.6–1.3)
CRP SERPL QL: 9 MG/L
D DIMER PPP FEU-MCNC: 3.26 UG/ML FEU
DEPRECATED AT III PPP: 93 % OF NORMAL (ref 92–136)
DS:DELIVERY SYSTEM: 22
EOSINOPHIL # BLD AUTO: 0 THOUSAND/ÂΜL (ref 0–0.61)
EOSINOPHIL NFR BLD AUTO: 0 % (ref 0–6)
ERYTHROCYTE [DISTWIDTH] IN BLOOD BY AUTOMATED COUNT: 17 % (ref 11.6–15.1)
FIBRINOGEN PPP-MCNC: 371 MG/DL (ref 227–495)
FIO2 GAS DIL.REBREATH: 40 L
FIO2 GAS DIL.REBREATH: 60 L
FIO2 GAS DIL.REBREATH: 60 L
GFR SERPL CREATININE-BSD FRML MDRD: 58 ML/MIN/1.73SQ M
GFR SERPL CREATININE-BSD FRML MDRD: 60 ML/MIN/1.73SQ M
GFR SERPL CREATININE-BSD FRML MDRD: 62 ML/MIN/1.73SQ M
GLUCOSE SERPL-MCNC: 116 MG/DL (ref 65–140)
GLUCOSE SERPL-MCNC: 142 MG/DL (ref 65–140)
GLUCOSE SERPL-MCNC: 143 MG/DL (ref 65–140)
GLUCOSE SERPL-MCNC: 146 MG/DL (ref 65–140)
GLUCOSE SERPL-MCNC: 154 MG/DL (ref 65–140)
GLUCOSE SERPL-MCNC: 156 MG/DL (ref 65–140)
HCO3 BLDA-SCNC: 36.3 MMOL/L (ref 22–28)
HCO3 BLDA-SCNC: 36.4 MMOL/L (ref 22–28)
HCO3 BLDA-SCNC: 37.4 MMOL/L (ref 22–28)
HCO3 BLDA-SCNC: 39.6 MMOL/L (ref 22–28)
HCO3 BLDA-SCNC: 40 MMOL/L (ref 22–28)
HCO3 BLDA-SCNC: 41.3 MMOL/L (ref 22–28)
HCO3 BLDV-SCNC: 42.1 MMOL/L (ref 24–30)
HCT VFR BLD AUTO: 49.3 % (ref 36.5–49.3)
HCT VFR BLD CALC: 45 % (ref 36.5–49.3)
HCT VFR BLD CALC: 46 % (ref 36.5–49.3)
HCT VFR BLD CALC: 46 % (ref 36.5–49.3)
HEPARIN CF II AG PPP IA-MCNC: 0.54 IU/ML
HGB BLD-MCNC: 13.6 G/DL (ref 12–17)
HGB BLDA-MCNC: 15.3 G/DL (ref 12–17)
HGB BLDA-MCNC: 15.6 G/DL (ref 12–17)
HGB BLDA-MCNC: 15.6 G/DL (ref 12–17)
IMM GRANULOCYTES # BLD AUTO: 0.05 THOUSAND/UL (ref 0–0.2)
IMM GRANULOCYTES NFR BLD AUTO: 1 % (ref 0–2)
IPAP: 22
IPAP: 25
LYMPHOCYTES # BLD AUTO: 0.72 THOUSANDS/ÂΜL (ref 0.6–4.47)
LYMPHOCYTES NFR BLD AUTO: 7 % (ref 14–44)
MAGNESIUM SERPL-MCNC: 2 MG/DL (ref 1.6–2.6)
MCH RBC QN AUTO: 24.6 PG (ref 26.8–34.3)
MCHC RBC AUTO-ENTMCNC: 27.6 G/DL (ref 31.4–37.4)
MCV RBC AUTO: 89 FL (ref 82–98)
MONOCYTES # BLD AUTO: 0.2 THOUSAND/ÂΜL (ref 0.17–1.22)
MONOCYTES NFR BLD AUTO: 2 % (ref 4–12)
NEUTROPHILS # BLD AUTO: 9.4 THOUSANDS/ÂΜL (ref 1.85–7.62)
NEUTS SEG NFR BLD AUTO: 90 % (ref 43–75)
NON VENT- BIPAP: ABNORMAL
NRBC BLD AUTO-RTO: 0 /100 WBCS
O2 CT BLDA-SCNC: 17.5 ML/DL (ref 16–23)
O2 CT BLDA-SCNC: 18.6 ML/DL (ref 16–23)
O2 CT BLDA-SCNC: 19 ML/DL (ref 16–23)
O2 CT BLDA-SCNC: 19.3 ML/DL (ref 16–23)
O2 CT BLDA-SCNC: 20.1 ML/DL (ref 16–23)
O2 CT BLDV-SCNC: 18.6 ML/DL
OXYHGB MFR BLDA: 87.1 % (ref 94–97)
OXYHGB MFR BLDA: 91.6 % (ref 94–97)
OXYHGB MFR BLDA: 92 % (ref 94–97)
OXYHGB MFR BLDA: 93.7 % (ref 94–97)
OXYHGB MFR BLDA: 94 % (ref 94–97)
PCO2 BLD: 42 MMOL/L (ref 21–32)
PCO2 BLD: 86.3 MM HG (ref 36–44)
PCO2 BLD: >103 MM HG (ref 36–44)
PCO2 BLD: >103 MM HG (ref 36–44)
PCO2 BLDA: 112.9 MM HG (ref 36–44)
PCO2 BLDA: 78 MM HG (ref 36–44)
PCO2 BLDA: 80 MM HG (ref 36–44)
PCO2 BLDA: 81.3 MM HG (ref 36–44)
PCO2 BLDA: 86 MM HG (ref 36–44)
PCO2 BLDV: 98.1 MM HG (ref 42–50)
PEEP MAX SETTING VENT: 10 CM[H2O]
PEEP MAX SETTING VENT: 15 CM[H2O]
PH BLD: 7.16 [PH] (ref 7.35–7.45)
PH BLD: 7.17 [PH] (ref 7.35–7.45)
PH BLD: 7.27 [PH] (ref 7.35–7.45)
PH BLDA: 7.18 [PH] (ref 7.35–7.45)
PH BLDA: 7.26 [PH] (ref 7.35–7.45)
PH BLDA: 7.28 [PH] (ref 7.35–7.45)
PH BLDA: 7.29 [PH] (ref 7.35–7.45)
PH BLDA: 7.31 [PH] (ref 7.35–7.45)
PH BLDV: 7.25 [PH] (ref 7.3–7.4)
PHOSPHATE SERPL-MCNC: 4.2 MG/DL (ref 2.7–4.5)
PLATELET # BLD AUTO: 232 THOUSANDS/UL (ref 149–390)
PMV BLD AUTO: 9.3 FL (ref 8.9–12.7)
PO2 BLD: 70 MM HG (ref 75–129)
PO2 BLD: 71 MM HG (ref 75–129)
PO2 BLD: 78 MM HG (ref 75–129)
PO2 BLDA: 57.1 MM HG (ref 75–129)
PO2 BLDA: 70.3 MM HG (ref 75–129)
PO2 BLDA: 75.6 MM HG (ref 75–129)
PO2 BLDA: 78.5 MM HG (ref 75–129)
PO2 BLDA: 80.9 MM HG (ref 75–129)
PO2 BLDV: 63.7 MM HG (ref 35–45)
POTASSIUM BLD-SCNC: 5.1 MMOL/L (ref 3.5–5.3)
POTASSIUM SERPL-SCNC: 4.8 MMOL/L (ref 3.5–5.3)
POTASSIUM SERPL-SCNC: 5 MMOL/L (ref 3.5–5.3)
POTASSIUM SERPL-SCNC: 5.5 MMOL/L (ref 3.5–5.3)
PROCALCITONIN SERPL-MCNC: 0.07 NG/ML
PROT SERPL-MCNC: 6.8 G/DL (ref 6.4–8.4)
RBC # BLD AUTO: 5.52 MILLION/UL (ref 3.88–5.62)
SAO2 % BLD FROM PO2: 90 % (ref 60–85)
SODIUM BLD-SCNC: 141 MMOL/L (ref 136–145)
SODIUM BLD-SCNC: 142 MMOL/L (ref 136–145)
SODIUM BLD-SCNC: 143 MMOL/L (ref 136–145)
SODIUM SERPL-SCNC: 145 MMOL/L (ref 135–147)
SODIUM SERPL-SCNC: 146 MMOL/L (ref 135–147)
SODIUM SERPL-SCNC: 147 MMOL/L (ref 135–147)
SPECIMEN SOURCE: ABNORMAL
VENT BIPAP FIO2: 100 %
VENT BIPAP FIO2: 100 %
VENT BIPAP FIO2: 60 %
VENT BIPAP FIO2: 60 %
VENT BIPAP FIO2: 75 %
VENT BIPAP FIO2: 75 %
VENTILATION VALUE: 10
WBC # BLD AUTO: 10.38 THOUSAND/UL (ref 4.31–10.16)

## 2022-12-03 PROCEDURE — 4A133B1 MONITORING OF ARTERIAL PRESSURE, PERIPHERAL, PERCUTANEOUS APPROACH: ICD-10-PCS | Performed by: ANESTHESIOLOGY

## 2022-12-03 PROCEDURE — 4A133J1 MONITORING OF ARTERIAL PULSE, PERIPHERAL, PERCUTANEOUS APPROACH: ICD-10-PCS | Performed by: ANESTHESIOLOGY

## 2022-12-03 PROCEDURE — 03HY32Z INSERTION OF MONITORING DEVICE INTO UPPER ARTERY, PERCUTANEOUS APPROACH: ICD-10-PCS | Performed by: ANESTHESIOLOGY

## 2022-12-03 RX ORDER — ACETAZOLAMIDE 500 MG/5ML
500 INJECTION, POWDER, LYOPHILIZED, FOR SOLUTION INTRAVENOUS ONCE
Status: COMPLETED | OUTPATIENT
Start: 2022-12-03 | End: 2022-12-03

## 2022-12-03 RX ORDER — METOPROLOL TARTRATE 5 MG/5ML
10 INJECTION INTRAVENOUS EVERY 6 HOURS
Status: DISCONTINUED | OUTPATIENT
Start: 2022-12-03 | End: 2022-12-05

## 2022-12-03 RX ORDER — LIDOCAINE HYDROCHLORIDE 10 MG/ML
1 INJECTION, SOLUTION EPIDURAL; INFILTRATION; INTRACAUDAL; PERINEURAL ONCE
Status: COMPLETED | OUTPATIENT
Start: 2022-12-03 | End: 2022-12-03

## 2022-12-03 RX ORDER — CALCIUM GLUCONATE 20 MG/ML
1 INJECTION, SOLUTION INTRAVENOUS ONCE
Status: COMPLETED | OUTPATIENT
Start: 2022-12-03 | End: 2022-12-03

## 2022-12-03 RX ORDER — WATER 1000 ML/1000ML
INJECTION, SOLUTION INTRAVENOUS
Status: COMPLETED
Start: 2022-12-03 | End: 2022-12-03

## 2022-12-03 RX ORDER — SODIUM CHLORIDE FOR INHALATION 0.9 %
3 VIAL, NEBULIZER (ML) INHALATION
Status: DISCONTINUED | OUTPATIENT
Start: 2022-12-03 | End: 2022-12-12

## 2022-12-03 RX ORDER — METOPROLOL TARTRATE 5 MG/5ML
5 INJECTION INTRAVENOUS EVERY 6 HOURS
Status: DISCONTINUED | OUTPATIENT
Start: 2022-12-03 | End: 2022-12-03

## 2022-12-03 RX ORDER — FUROSEMIDE 10 MG/ML
80 INJECTION INTRAMUSCULAR; INTRAVENOUS ONCE
Status: COMPLETED | OUTPATIENT
Start: 2022-12-03 | End: 2022-12-03

## 2022-12-03 RX ORDER — ENOXAPARIN SODIUM 100 MG/ML
60 INJECTION SUBCUTANEOUS EVERY 12 HOURS SCHEDULED
Status: DISCONTINUED | OUTPATIENT
Start: 2022-12-03 | End: 2022-12-05

## 2022-12-03 RX ORDER — LEVALBUTEROL 1.25 MG/.5ML
1.25 SOLUTION, CONCENTRATE RESPIRATORY (INHALATION)
Status: DISCONTINUED | OUTPATIENT
Start: 2022-12-03 | End: 2022-12-12

## 2022-12-03 RX ORDER — DEXTROSE MONOHYDRATE 25 G/50ML
25 INJECTION, SOLUTION INTRAVENOUS ONCE
Status: COMPLETED | OUTPATIENT
Start: 2022-12-03 | End: 2022-12-03

## 2022-12-03 RX ADMIN — FUROSEMIDE 80 MG: 10 INJECTION, SOLUTION INTRAMUSCULAR; INTRAVENOUS at 12:20

## 2022-12-03 RX ADMIN — METOROPROLOL TARTRATE 5 MG: 5 INJECTION, SOLUTION INTRAVENOUS at 10:55

## 2022-12-03 RX ADMIN — LEVALBUTEROL HYDROCHLORIDE 1.25 MG: 1.25 SOLUTION, CONCENTRATE RESPIRATORY (INHALATION) at 20:02

## 2022-12-03 RX ADMIN — ENOXAPARIN SODIUM 60 MG: 60 INJECTION SUBCUTANEOUS at 10:58

## 2022-12-03 RX ADMIN — FUROSEMIDE 40 MG: 10 INJECTION, SOLUTION INTRAMUSCULAR; INTRAVENOUS at 08:48

## 2022-12-03 RX ADMIN — ACETAZOLAMIDE 500 MG: 500 INJECTION, POWDER, LYOPHILIZED, FOR SOLUTION INTRAVENOUS at 12:20

## 2022-12-03 RX ADMIN — METOROPROLOL TARTRATE 5 MG: 5 INJECTION, SOLUTION INTRAVENOUS at 17:03

## 2022-12-03 RX ADMIN — METOROPROLOL TARTRATE 10 MG: 5 INJECTION, SOLUTION INTRAVENOUS at 21:57

## 2022-12-03 RX ADMIN — FUROSEMIDE 40 MG: 10 INJECTION, SOLUTION INTRAMUSCULAR; INTRAVENOUS at 17:03

## 2022-12-03 RX ADMIN — HEPARIN SODIUM 4000 UNITS: 1000 INJECTION INTRAVENOUS; SUBCUTANEOUS at 00:26

## 2022-12-03 RX ADMIN — WATER 5 ML: 1 INJECTION INTRAMUSCULAR; INTRAVENOUS; SUBCUTANEOUS at 12:28

## 2022-12-03 RX ADMIN — EPOPROSTENOL 50 NG/KG/MIN: 1.5 INJECTION, POWDER, LYOPHILIZED, FOR SOLUTION INTRAVENOUS at 17:37

## 2022-12-03 RX ADMIN — ISODIUM CHLORIDE 3 ML: 0.03 SOLUTION RESPIRATORY (INHALATION) at 20:03

## 2022-12-03 RX ADMIN — DEXAMETHASONE SODIUM PHOSPHATE 18.4 MG: 10 INJECTION, SOLUTION INTRAMUSCULAR; INTRAVENOUS at 09:03

## 2022-12-03 RX ADMIN — LEVALBUTEROL HYDROCHLORIDE 1.25 MG: 1.25 SOLUTION, CONCENTRATE RESPIRATORY (INHALATION) at 13:19

## 2022-12-03 RX ADMIN — INSULIN HUMAN 10 UNITS: 100 INJECTION, SOLUTION PARENTERAL at 09:43

## 2022-12-03 RX ADMIN — REMDESIVIR 100 MG: 100 INJECTION, POWDER, LYOPHILIZED, FOR SOLUTION INTRAVENOUS at 00:42

## 2022-12-03 RX ADMIN — ENOXAPARIN SODIUM 60 MG: 60 INJECTION SUBCUTANEOUS at 21:47

## 2022-12-03 RX ADMIN — LIDOCAINE HYDROCHLORIDE 1 ML: 10 INJECTION, SOLUTION EPIDURAL; INFILTRATION; INTRACAUDAL; PERINEURAL at 14:36

## 2022-12-03 RX ADMIN — CALCIUM GLUCONATE 1 G: 20 INJECTION, SOLUTION INTRAVENOUS at 08:52

## 2022-12-03 RX ADMIN — DEXTROSE MONOHYDRATE 25 ML: 25 INJECTION, SOLUTION INTRAVENOUS at 09:41

## 2022-12-03 RX ADMIN — DEXAMETHASONE SODIUM PHOSPHATE 18.4 MG: 10 INJECTION, SOLUTION INTRAMUSCULAR; INTRAVENOUS at 20:30

## 2022-12-03 RX ADMIN — ISODIUM CHLORIDE 3 ML: 0.03 SOLUTION RESPIRATORY (INHALATION) at 13:19

## 2022-12-03 NOTE — ASSESSMENT & PLAN NOTE
Wt Readings from Last 3 Encounters:   12/02/22 (!) 184 kg (406 lb)     Continue Lasix b i d    Daily weights

## 2022-12-03 NOTE — PLAN OF CARE
Problem: GENITOURINARY - ADULT  Goal: Urinary catheter remains patent  Description: INTERVENTIONS:  - Assess patency of urinary catheter  - If patient has a chronic diallo, consider changing catheter if non-functioning  - Follow guidelines for intermittent irrigation of non-functioning urinary catheter  Outcome: Progressing     Problem: METABOLIC, FLUID AND ELECTROLYTES - ADULT  Goal: Electrolytes maintained within normal limits  Description: INTERVENTIONS:  - Monitor labs and assess patient for signs and symptoms of electrolyte imbalances  - Administer electrolyte replacement as ordered  - Monitor response to electrolyte replacements, including repeat lab results as appropriate  - Instruct patient on fluid and nutrition as appropriate  Outcome: Progressing

## 2022-12-03 NOTE — PROGRESS NOTES
[address] [facility]  Progress Note Danielle Montgomery 1970, 46 y o  male MRN: [id number]  Unit/Bed#:  Encounter: [id number]  Primary Care Provider: No primary care provider on file  Date and time admitted to hospital: 12/1/2022  5:52 PM    Elevated serum creatinine  Assessment & Plan  Unclear if Cardio renal or if LINDEN  Continue to monitor I&O and renal indices    Pulmonary hypertension (HCC)  Assessment & Plan  Continue sildenafil    Obstructive sleep apnea  Assessment & Plan  BiPAP at HS    Morbid obesity [facility])  Assessment & Plan  Nutrition consulted  Encourage lifestyle modifications    Mixed hyperlipidemia  Assessment & Plan  Continue home statin    CHF (congestive heart failure) ([facility])  Assessment & Plan  Wt Readings from Last 3 Encounters:   12/02/22 (!) 184 kg (406 lb)     Continue Lasix b i d  Daily weights        * Acute respiratory failure due to COVID-19 [facility])  Assessment & Plan  Upgraded to severe pathway due to increasing oxygen requirements to high-flow nasal cannula  Continue remdesivir and baricitinib  Increased Decadron to 0 1 milligrams/kg b i d  Bronchodilators as needed  Prone as tolerated          ----------------------------------------------------------------------------------------  HPI/24hr events:  Patient with COVID pneumonia the subject of a DI alert and hospitalist calling with concern for increasing oxygen requirements  Patient tachypneic, speaking in short sentences on 15 L mid flow nasal cannula with oxygen saturation of 88-90%  Due to his morbid obesity and escalating oxygen requirements, patient transferred to the intensive care unit for closer monitoring  Patient is amenable to intubation if necessary, however will attempt BiPAP for hypercapnic respiratory failure      Patient appropriate for transfer out of the ICU today?: No  Disposition: Transfer to Critical Care   Code Status: Level 1 - Full Code  ---------------------------------------------------------------------------------------  SUBJECTIVE  Short of breath    Review of Systems  Review of systems was reviewed and negative unless stated above in HPI/24-hour events   ---------------------------------------------------------------------------------------  OBJECTIVE    Vitals   Vitals:    22 0018 22 0200 22 0250 22 0310   BP:  109/73     BP Location:       Pulse:  (!) 132     Resp:       Temp:  100 °F (37 8 °C)     TempSrc:       SpO2: (!) 85% 92% 92% 91%   Weight:       Height:         Temp (24hrs), Av 3 °F (37 4 °C), Min:98 6 °F (37 °C), Max:100 °F (37 8 °C)  Current: Temperature: 100 °F (37 8 °C)          Respiratory:  SpO2: SpO2: 91 %  Nasal Cannula O2 Flow Rate (L/min): (S) 15 L/min    Invasive/non-invasive ventilation settings   Respiratory    Lab Data (Last 4 hours)       022            pH, Arterial       7  181             pCO2, Arterial       112 9             pO2, Arterial       75 6             HCO3, Arterial       41 3             Base Excess, Arterial       8 2                  O2/Vent Data           Most Recent        Non-Invasive Ventilation Mode   BiPAP                  Physical Exam  GEN:  Morbidly obese, ill-appearing  HEENT:  Sclera anicteric, mucous membranes pink and moist, conjunctiva pink, no marguerite/rhinorrhea  CV :  S1S2, regular, tachycardic no murmurs, rubs or gallops  Intact distal pulses  Resp:  Lungs diminished throughout  No subq air or crepitus  Symmetrical expansion  Nonproductive cough noted    GI :  Abd soft, nontender, no guarding/rebound, nondistended, hypoactive bowel sounds X4 quads  Neuro:  CN II-XII grossly intact, nonfocal exam, speech clear, GCS 15        Laboratory and Diagnostics:  Results from last 7 days   Lab Units 22  0448 22  1822   WBC Thousand/uL 10 64* 9 50   HEMOGLOBIN g/dL 14 4 15 2   HEMATOCRIT % 51 5* 52 3*   PLATELETS Thousands/uL 232 269   NEUTROS PCT %  --  65   MONOS PCT %  --  12     Results from last 7 days   Lab Units 12/02/22  0448 12/01/22  1822   SODIUM mmol/L 146 145   POTASSIUM mmol/L 5 0 4 5   CHLORIDE mmol/L 106 105   CO2 mmol/L 33* 33*   ANION GAP mmol/L 7 7   BUN mg/dL 33* 37*   CREATININE mg/dL 1 48* 1 39*   CALCIUM mg/dL 8 6 8 7   GLUCOSE RANDOM mg/dL 129 113   ALT U/L  --  36   AST U/L  --  24   ALK PHOS U/L  --  68   ALBUMIN g/dL  --  3 2*   TOTAL BILIRUBIN mg/dL  --  0 57     Results from last 7 days   Lab Units 12/01/22  1822   MAGNESIUM mg/dL 2 2      Results from last 7 days   Lab Units 12/03/22  0049 12/02/22  2301 12/02/22  1256 12/02/22  0532 12/01/22  2238   INR   --   --   --   --  1 18   PTT seconds 69* 36 32 28 22*              ABG:  Results from last 7 days   Lab Units 12/03/22  0224   PH ART  7 181*   PCO2 ART mm Hg 112 9*   PO2 ART mm Hg 75 6   HCO3 ART mmol/L 41 3*   BASE EXC ART mmol/L 8 2   ABG SOURCE  Radial, Left     VBG:  Results from last 7 days   Lab Units 12/03/22  0224   ABG SOURCE  Radial, Left     Results from last 7 days   Lab Units 12/02/22  1256   PROCALCITONIN ng/ml 0 11       Micro        EKG:  Sinus tach  Imaging: I have personally reviewed pertinent reports  and I have personally reviewed pertinent films in PACS    Intake and Output  I/O       12/01 0701 12/02 0700 12/02 0701 12/03 0700    I V  (mL/kg)  329 (1 8)    IV Piggyback 500 270    Total Intake(mL/kg) 500 (2 7) 599 (3 3)    Urine (mL/kg/hr) 2175 2440 (0 6)    Total Output 2175 2440    Net -9757 -1846                Height and Weights   Height: 5' 11" (180 3 cm)  IBW (Ideal Body Weight): 75 3 kg  Body mass index is 56 63 kg/m²    Weight (last 2 days)     Date/Time Weight    12/02/22 1430 184 (406)    12/01/22 1800 184 (406)            Nutrition       Diet Orders   (From admission, onward)             Start     Ordered    12/02/22 1841  Diet NPO  Diet effective now        References:    Nutrtion Support Algorithm Enteral vs  Parenteral   Question Answer Comment   Diet Type NPO    RD to adjust diet per protocol?  No        12/02/22 1840                  Active Medications  Scheduled Meds:  Current Facility-Administered Medications   Medication Dose Route Frequency Provider Last Rate   • acetaminophen  650 mg Oral Q6H PRN ELIZABETH Wolf     • albuterol  2 puff Inhalation Q4H PRN ELIZABETH Byrd     • atorvastatin  10 mg Oral Daily With [employer] ELIZABETH Meléndez     • baricitinib  2 mg Oral Q24H ELIZABETH Wolf     • cefTRIAXone  2,000 mg Intravenous Q24H LEIZABETH Wolf 2,000 mg (12/02/22 2336)   • dexamethasone  0 1 mg/kg Intravenous Q12H ELIZABETH Wolf 18 4 mg (12/02/22 2340)   • dextromethorphan-guaiFENesin  10 mL Oral Q4H PRN ELIZABETH Byrd     • doxycycline hyclate  100 mg Oral Q12H [address] GONZALEZ, [address]     • furosemide  40 mg Intravenous BID (diuretic) ELIZABETH Wofl     • heparin (porcine)  3-30 Units/kg/hr (Order-Specific) Intravenous Titrated ELIZABETH Wolf 23 1 Units/kg/hr (12/02/22 2353)   • heparin (porcine)  2,000 Units Intravenous Q1H PRN ELIZABETH Wolf     • heparin (porcine)  4,000 Units Intravenous Q1H PRN ELIZABETH Byrd     • metoprolol tartrate  50 mg Oral Q12H [address] CHARLERLENIN, [address]     • remdesivir  100 mg Intravenous Q24H ELIZABETH Wolf     • sildenafil  20 mg Oral TID ELIZABETH Byrd       Continuous Infusions:  heparin (porcine), 3-30 Units/kg/hr (Order-Specific), Last Rate: 23 1 Units/kg/hr (12/02/22 2353)      PRN Meds:   acetaminophen, 650 mg, Q6H PRN  albuterol, 2 puff, Q4H PRN  dextromethorphan-guaiFENesin, 10 mL, Q4H PRN  heparin (porcine), 2,000 Units, Q1H PRN  heparin (porcine), 4,000 Units, Q1H PRN        Invasive Devices Review  Invasive Devices     Peripheral Intravenous Line  Duration           Peripheral IV Distal;Left;Upper Forearm -- days    Peripheral IV 12/02/22 Distal;Dorsal (posterior); Right Forearm <1 day    Peripheral IV 12/03/22 Right Antecubital <1 day          Drain  Duration           Urethral Catheter <1 day                Rationale for remaining devices:  Not applicable  ---------------------------------------------------------------------------------------  Advance Directive and Living Will:      Power of :    POLST:    ---------------------------------------------------------------------------------------  Care Time Delivered:   Upon my evaluation, this patient had a high probability of imminent or life-threatening deterioration due to Acute respiratory failure, which required my direct attention, intervention, and personal management  I have personally provided 35 minutes of critical care time, exclusive of procedures, teaching, family meetings, and any prior time recorded by providers other than myself  ELIZABETH Busch      Portions of the record may have been created with voice recognition software  Occasional wrong word or "sound a like" substitutions may have occurred due to the inherent limitations of voice recognition software    Read the chart carefully and recognize, using context, where substitutions have occurred

## 2022-12-03 NOTE — ASSESSMENT & PLAN NOTE
Upgraded to severe pathway due to increasing oxygen requirements to high-flow nasal cannula  Continue remdesivir and baricitinib  Increased Decadron to 0 1 milligrams/kg b i d    Bronchodilators as needed  Prone as tolerated

## 2022-12-03 NOTE — QUICK NOTE
Patient's spouse called for a clinical update  Discussed 24 hour updates including patient's transfer to critical care and initiation on BIPAP  Expressed my concerns regarding the patient's respiratory status and communicated the potential need for intubation as our next steps since patient is not doing well on BIPAP  Advised spouse we will continue to optimize medical issues to the best of our ability by diuresing the patient, continuing severe COVID pathway and increasing BIPAP settings as tolerated  Obtained consent from spouse for placement of arterial line for close ABG monitoring  Will continue to follow ABG  Advised spouse we will update her with major changes

## 2022-12-03 NOTE — PROCEDURES
Arterial Line Insertion    Date/Time: 12/3/2022 2:15 PM  Performed by: ELIZABETH Correa  Authorized by: ELIZABETH Correa     Patient location:  Bedside  Other Assisting Provider: No    Consent:     Consent obtained:  Verbal    Consent given by:  Spouse    Risks discussed:  Bleeding, ischemia, repeat procedure, infection and pain  Universal protocol:     Procedure explained and questions answered to patient or proxy's satisfaction: yes      Immediately prior to procedure a time out was called: yes      Patient identity confirmed:  Hospital-assigned identification number  Indications:     Indications: multiple ABGs and frequent labs / infusion    Pre-procedure details:     Skin preparation:  Chlorhexidine    Preparation: Patient was prepped and draped in sterile fashion    Anesthesia (see MAR for exact dosages): Anesthesia method:  Local infiltration    Local anesthetic:  Lidocaine 1% w/o epi  Procedure details:     Location / Tip of Catheter:  Radial    Laterality:  Right    Mike's test performed: yes      Mike's test abnormal: no      Needle gauge:  20 G    Placement technique:  Seldinger    Number of attempts:  1    Successful placement: yes      Transducer: waveform confirmed    Post-procedure details:     Post-procedure:  Sutured and sterile dressing applied    CMS:  Unchanged and normal    Patient tolerance of procedure:   Tolerated well, no immediate complications

## 2022-12-03 NOTE — CONSULTS
Consultation - Pulmonary Medicine   [facility] 46 y o  male MRN: [id number]  Unit/Bed#:  Encounter: [id number]      Assessment:  Acute on chronic hypoxemic and hypercapnic respiratory failure  Obesity hypoventilation  Severe pulmonary hypertension and right heart failure  COVID-19 infection  Morbid obesity    Plan:   Acute on chronic hypoxemic and hypercapnic respiratory failure in this patient with morbid obesity likely with obesity hypoventilation and severe pulmonary hypertension and right heart failure  Although with recent COVID-19 infection his significant hypoxemia does not seem to be from the COVID-19 infection no evidence of any significant cover pneumo COVID-19 pneumonia ground-glass opacities in the CT of the chest no evidence of any pulmonary embolism in the recent CT angiogram, also no significant elevation of his the inflammatory markers or CRP as well, although slightly elevated from normal    Recent PFTs done 11/04/2022 at [facility] demonstrating significant restrictive airflow limitation with moderately decreased lung volumes and moderately decreased DLCO , also with significantl small airway obstructive airflow limitation with decreased FEF-25% with no response to the bronchodilator  High-resolution CT of the chest done in December of 2021 no evidence of any interstitial lung disease  Also recent diagnostic polysomnogram with very severe obstructive sleep apnea with an AHI of 107, as per documentation from pulmonology at [facility] , had some CPAP/BiPAP titration study on 11/20 just prior to Thanksgiving patient's wife states, still awaiting the PAP machine  Echocardiogram December 2021 at [facility] demonstrating severe pulmonary hypertension with a systolic pressures of 66,   Currently on BiPAP 25/15 and 75% FiO2 management as per critical care    Also on COVID pathway severe COVID pathway and on high dose of dexamethasone  Would add albuterol Atrovent inhaler round-the-clock given his significant small airway obstructive airflow limitation as well in the PFTs  Also on revatio , no evidence of right heart catheterization, would need right heart catheterization and visit responsiveness and treatment for his severe pulmonary hypertension  Would need NIV the time of discharge  Thank you for the consultation will continue to follow  Discussed with cc team     History of Present Illness   Physician Requesting Consult: Guille Farmer DO  Reason for Consult / Principal Problem: To evaluate hypoxia secondary to COVID  Hx and PE limited by:  Currently on BiPAP, history from  chart and critical care team and also spoke to patient's wife on the phone  HPI: Fredi Ortega is a 46y o  year old male who works as a , as per patient's wife he has been having shortness of breath and dyspnea on exertion for almost a year has been following up at pulmonology [facility], was found to have significant restrictive lung disease as well as hypoxemic for which he was recently placed on home oxygen 6 L patient's wife states, which he has been using and also was diagnosed with obstructive sleep apnea and also had a titration study on 11/20 awaiting PAP machine  Patient's wife stated that he had all the COVID 19 initial vaccinations as well as booster doses, recently for the past 2-3 days has been having worsening shortness of breath in spite of wearing his the home oxygen and taking his inhalers she states for which he was brought into the ER yesterday  Consults    Review of Systems  Currently on BiPAP/drowsy  could not not evaluate full review of systems  Historical Information   Past Medical History:   Diagnosis Date   • CHF (congestive heart failure) ([facility] [address] )    • Hypertension    • Sleep apnea      History reviewed  No pertinent surgical history    Social History   Social History     Substance and Sexual Activity   Alcohol Use Not Currently     Social History     Substance and Sexual Activity   Drug Use Not Currently     E-Cigarette/Vaping     E-Cigarette/Vaping Substances     Social History     Tobacco Use   Smoking Status Former   • Types: Cigarettes   Smokeless Tobacco Never         Family History: non-contributory    Meds/Allergies   current meds:   Current Facility-Administered Medications   Medication Dose Route Frequency   • acetaminophen (TYLENOL) tablet 650 mg  650 mg Oral Q6H PRN   • albuterol (PROVENTIL HFA,VENTOLIN HFA) inhaler 2 puff  2 puff Inhalation Q4H PRN   • atorvastatin (LIPITOR) tablet 10 mg  10 mg Oral Daily With Dinner   • baricitinib (OLUMIANT) tablet 2 mg  2 mg Oral Q24H   • dexamethasone (DECADRON) 18 4 mg in sodium chloride 0 9 % 50 mL IVPB  0 1 mg/kg Intravenous Q12H   • dextromethorphan-guaiFENesin (ROBITUSSIN DM) oral syrup 10 mL  10 mL Oral Q4H PRN   • enoxaparin (LOVENOX) subcutaneous injection 60 mg  60 mg Subcutaneous Q12H CRISTIN   • furosemide (LASIX) injection 40 mg  40 mg Intravenous BID (diuretic)   • levalbuterol (XOPENEX) inhalation solution 1 25 mg  1 25 mg Nebulization TID   • metoprolol (LOPRESSOR) injection 5 mg  5 mg Intravenous Q6H   • remdesivir (Veklury) 100 mg in sodium chloride 0 9 % 270 mL IVPB  100 mg Intravenous Q24H   • sildenafil (REVATIO) tablet 20 mg  20 mg Oral TID   • sodium chloride 0 9 % inhalation solution 3 mL  3 mL Nebulization TID       No Known Allergies    Objective   Vitals: Blood pressure 109/73, pulse (!) 132, temperature 99 7 °F (37 6 °C), resp  rate 13, height 5' 11" (1 803 m), weight (!) 184 kg (406 lb), SpO2 93 %  ,Body mass index is 56 63 kg/m²  Intake/Output Summary (Last 24 hours) at 12/3/2022 1046  Last data filed at 12/3/2022 1001  Gross per 24 hour   Intake 900 39 ml   Output 4410 ml   Net -3509 61 ml     Invasive Devices     Peripheral Intravenous Line  Duration           Peripheral IV Distal;Left;Upper Forearm -- days    Peripheral IV 12/02/22 Distal;Dorsal (posterior); Right Forearm <1 day    Peripheral IV 12/03/22 Right Antecubital <1 day          Drain  Duration           Urethral Catheter <1 day                Physical Exam  Morbidly obese male, currently on BiPAP  Neck short and wide, no evidence of any JVD  Lungs diminished breath sounds bilaterally no rhonchi  Heart first and second heart sounds are heard no murmur or gallop is heard  Abdomen distended, ? Ascites, bowel sounds are present  Skin no evidence of any rash no bruises  Extremities bilateral pedal edema  CNS drowsy opens his eyes calling his name  Lab Results:   CBC:   Lab Results   Component Value Date    WBC 10 38 (H) 12/03/2022    HGB 13 6 12/03/2022    HCT 49 3 12/03/2022    MCV 89 12/03/2022     12/03/2022    MCH 24 6 (L) 12/03/2022    MCHC 27 6 (L) 12/03/2022    RDW 17 0 (H) 12/03/2022    MPV 9 3 12/03/2022    NRBC 0 12/03/2022     Imaging Studies: I have personally reviewed pertinent films in PACS  EKG, Pathology, and Other Studies: I have personally reviewed pertinent reports      VTE Prophylaxis: Sequential compression device [facility])     Code Status: Level 1 - Full Code  Advance Directive and Living Will:      Power of :    POLST:

## 2022-12-03 NOTE — QUICK NOTE
QUICK NOTE - Deterioration Index  Jackeline Fernandez 46 y o  male MRN: [id number]  Unit/Bed#: -01 Encounter: [id number]      Time Paged: [address]  Room #: 323  Primary RN: Alejandro Venegas  Arrival Time:   Deterioration index score at time of page: 73 1    PROBLEMS:   Factors Contributing to Score  Contribution Factor Value   21% Respiratory rate 28   16% Supplemental oxygen Mid flow nasal cannula   13% Pulse 132   13% Neurological exam Lethargic   15 Age 46years old   5% Sodium 146 mmol/L   6% Cardiac rhythm Heart block     Contribution Factor Value   5% Potassium 5 0 mmol/L   3% WBC count abnormal (10 64 Thousand/uL)   3% Systolic 996   1% Pulse oximetry 93 %   2% BUN abnormal (33 mg/dL)   <1% Hematocrit abnormal (51 5 %)   <1% Temperature 98 6 °F (37 °C)        •     PLAN:    • Patient in the process of being transferred ICU for closer monitoring and escalating oxygen requirements    HPI Statement (Background): Jackeline Fernandez is a 46y o  year old male who presents with COVID pneumonia superimposed on congestive heart failure    Historical Information   Past Medical History:   Diagnosis Date   • CHF (congestive heart failure) ([facility] [address] )    • Hypertension    • Sleep apnea      History reviewed  No pertinent surgical history      Vitals:   Vitals:    22 1725 22 1740 22 1742 22 1805   BP:       BP Location:       Pulse: (!) 136 (!) 134  (!) 133   Resp:   (!) 36 (!) 28   Temp:       TempSrc:       SpO2: (!) 87% (!) 88% (S) (!) 86%    Weight:       Height:           Temperature: Temp (24hrs), Av 6 °F (37 °C), Min:98 6 °F (37 °C), Max:98 6 °F (37 °C)  Current: Temperature: 98 6 °F (37 °C)    DIAGNOSTIC DATA:    Labs:   Results from last 7 days   Lab Units 22  0448 22  1822   WBC Thousand/uL 10 64* 9 50   HEMOGLOBIN g/dL 14 4 15 2   HEMATOCRIT % 51 5* 52 3*   PLATELETS Thousands/uL 232 269   NEUTROS PCT %  --  65   MONOS PCT %  --  12     Results from last 7 days   Lab Units 12/02/22  0448 12/01/22  1822   SODIUM mmol/L 146 145   POTASSIUM mmol/L 5 0 4 5   CHLORIDE mmol/L 106 105   CO2 mmol/L 33* 33*   BUN mg/dL 33* 37*   CREATININE mg/dL 1 48* 1 39*   CALCIUM mg/dL 8 6 8 7   ALK PHOS U/L  --  68   ALT U/L  --  36   AST U/L  --  24     Results from last 7 days   Lab Units 12/01/22  1822   MAGNESIUM mg/dL 2 2          Results from last 7 days   Lab Units 12/02/22  1256 12/02/22  0532 12/01/22  2238   INR   --   --  1 18   PTT seconds 32 28 22*                 Results from last 7 days   Lab Units 12/02/22  1256   PROCALCITONIN ng/ml 0 11     No results found for: [facility]             Applicable Imaging Studies: NA    Code Status: Level 1 - Full Code

## 2022-12-03 NOTE — PROGRESS NOTES
[address] [place] [address]  Progress Note Anitha Sites 1970, 46 y o  male MRN: [id number]  Unit/Bed#:  Encounter: [id number]  Primary Care Provider: No primary care provider on file  Date and time admitted to hospital: 12/1/2022  5:52 PM    Elevated serum creatinine  Assessment & Plan  Unclear if Cardiorenal or if LINDEN  Continue to monitor I&O and renal indices    Pulmonary hypertension (HCC)  Assessment & Plan  Continue sildenafil    Obstructive sleep apnea  Assessment & Plan  BiPAP at HS    Morbid obesity [facility])  Assessment & Plan  Nutrition consulted  Encourage lifestyle modifications    Mixed hyperlipidemia  Assessment & Plan  Continue home statin    CHF (congestive heart failure) ([facility])  Assessment & Plan  Wt Readings from Last 3 Encounters:   12/02/22 (!) 184 kg (406 lb)     Continue Lasix b i d  Daily weights        * Acute respiratory failure due to COVID-19 [facility])  Assessment & Plan  Upgraded to severe pathway due to increasing oxygen requirements to high-flow nasal cannula, eventually required BiPAP overnight  Continue remdesivir and baricitinib, tocilizumab held due to administration of baricitinib  Increased Decadron to 0 1 milligrams/kg b i d  Bronchodilators as needed  Prone as tolerated          ----------------------------------------------------------------------------------------  HPI/24hr events:  Transferred to the ICU last evening for increasing oxygen requirements and work of breathing  Patient becoming more somnolent overnight as CO2 rises despite increasing BiPAP settings  This morning with BiPAP setting of 25/15 and 60% patient begins to improve clinically, however is continuing to become more hypoxic requiring FiO2 increased to 75%  Hemodynamically stable overnight  Respiratory status tenuous      Patient appropriate for transfer out of the ICU today?: No  Disposition: Continue Critical Care   Code Status: Level 1 - Full Code  ---------------------------------------------------------------------------------------  SUBJECTIVE  No complaints offered    Review of Systems  Review of systems was reviewed and negative unless stated above in HPI/24-hour events   ---------------------------------------------------------------------------------------  OBJECTIVE    Vitals   Vitals:    22 0018 22 0200 22 0250 22   BP:  109/73     BP Location:       Pulse:  (!) 132     Resp:       Temp:  100 °F (37 8 °C)     TempSrc:       SpO2: (!) 85% 92% 92% 91%   Weight:       Height:         Temp (24hrs), Av 3 °F (37 4 °C), Min:98 6 °F (37 °C), Max:100 °F (37 8 °C)  Current: Temperature: 100 °F (37 8 °C)          Respiratory:  SpO2: SpO2: 91 %  Nasal Cannula O2 Flow Rate (L/min): (S) 15 L/min    Invasive/non-invasive ventilation settings   Respiratory    Lab Data (Last 4 hours)                pH, Arterial       7  181            7 276            pCO2, Arterial       112 9            80 0            pO2, Arterial       75 6            57 1            HCO3, Arterial       41 3            36 4            Base Excess, Arterial       8 2            6 5                 O2/Vent Data         310   Most Recent      Non-Invasive Ventilation Mode  BiPAP  BiPAP                 Physical Exam  GEN:  Ill-appearing, morbidly obese  HEENT:  Sclera anicteric, mucous membranes pink and moist, conjunctiva pink, no marguerite/rhinorrhea  CV :  S1S2, regular, tachycardic no murmurs, rubs or gallops  Intact distal pulses  No JVD  Resp:  Lungs diminished throughout  No subq air or crepitus  Symmetrical expansion  No cough noted    Able to complete short sentences  GI :  Abd soft, nontender, no guarding/rebound, nondistended, normoactive bowel sounds X4 quads  Neuro:  CN II-XII grossly intact, nonfocal exam, GCS 15        Laboratory and Diagnostics:  Results from last 7 days   Lab Units 22  0448 22  1822   WBC Thousand/uL 10 64* 9 50   HEMOGLOBIN g/dL 14 4 15 2   HEMATOCRIT % 51 5* 52 3*   PLATELETS Thousands/uL 232 269   NEUTROS PCT %  --  65   MONOS PCT %  --  12     Results from last 7 days   Lab Units 12/02/22  0448 12/01/22  1822   SODIUM mmol/L 146 145   POTASSIUM mmol/L 5 0 4 5   CHLORIDE mmol/L 106 105   CO2 mmol/L 33* 33*   ANION GAP mmol/L 7 7   BUN mg/dL 33* 37*   CREATININE mg/dL 1 48* 1 39*   CALCIUM mg/dL 8 6 8 7   GLUCOSE RANDOM mg/dL 129 113   ALT U/L  --  36   AST U/L  --  24   ALK PHOS U/L  --  68   ALBUMIN g/dL  --  3 2*   TOTAL BILIRUBIN mg/dL  --  0 57     Results from last 7 days   Lab Units 12/01/22  1822   MAGNESIUM mg/dL 2 2      Results from last 7 days   Lab Units 12/03/22  0049 12/02/22  2301 12/02/22  1256 12/02/22  0532 12/01/22  2238   INR   --   --   --   --  1 18   PTT seconds 69* 36 32 28 22*              ABG:  Results from last 7 days   Lab Units 12/03/22  0412   PH ART  7 276*   PCO2 ART mm Hg 80 0*   PO2 ART mm Hg 57 1*   HCO3 ART mmol/L 36 4*   BASE EXC ART mmol/L 6 5   ABG SOURCE  Artery     VBG:  Results from last 7 days   Lab Units 12/03/22  0412   ABG SOURCE  Artery     Results from last 7 days   Lab Units 12/02/22  1256   PROCALCITONIN ng/ml 0 11       Micro        EKG:  Sinus tach  Imaging: I have personally reviewed pertinent reports  and I have personally reviewed pertinent films in PACS    Intake and Output  I/O       12/01 0701 12/02 0700 12/02 0701 12/03 0700    I V  (mL/kg)  412 2 (2 2)    IV Piggyback 500 270    Total Intake(mL/kg) 500 (2 7) 682 2 (3 7)    Urine (mL/kg/hr) 2175 2670 (0 6)    Total Output 2175 2670    Net -1675 -1987 8                Height and Weights   Height: 5' 11" (180 3 cm)  IBW (Ideal Body Weight): 75 3 kg  Body mass index is 56 63 kg/m²    Weight (last 2 days)     Date/Time Weight    12/02/22 1430 184 (406)    12/01/22 1800 184 (406)            Nutrition       Diet Orders   (From admission, onward)             Start     Ordered    12/02/22 1843 Diet NPO  Diet effective now        References:    Nutrtion Support Algorithm Enteral vs  Parenteral   Question Answer Comment   Diet Type NPO    RD to adjust diet per protocol?  No        12/02/22 1840                  Active Medications  Scheduled Meds:  Current Facility-Administered Medications   Medication Dose Route Frequency Provider Last Rate   • acetaminophen  650 mg Oral Q6H PRN Lacinda ThuELIZABETH Saavedra     • albuterol  2 puff Inhalation Q4H PRN Lacinda Thu ELIZABETH Meléndez     • atorvastatin  10 mg Oral Daily With [employer] ELIZABETH Meléndez     • baricitinib  2 mg Oral Q24H Lacinda Thu ELIZABETH GONZALEZ     • cefTRIAXone  2,000 mg Intravenous Q24H Lacinda ThuELIZABETH Saavedra 2,000 mg (12/02/22 2336)   • dexamethasone  0 1 mg/kg Intravenous Q12H Lacinda ThuELIZABETH Saavedra 18 4 mg (12/02/22 2340)   • dextromethorphan-guaiFENesin  10 mL Oral Q4H PRN Lacinda Thu ELIZABETH Meléndez     • doxycycline hyclate  100 mg Oral Q12H [address] [facility] Thu [facility]     • furosemide  40 mg Intravenous BID (diuretic) Lacinda ThuELIZABETH Saavedra     • heparin (porcine)  3-30 Units/kg/hr (Order-Specific) Intravenous Titrated Parthinda ELLY IyerNP 23 1 Units/kg/hr (12/02/22 2353)   • heparin (porcine)  2,000 Units Intravenous Q1H PRN Lacinda ThuELIZABETH Saavedra     • heparin (porcine)  4,000 Units Intravenous Q1H PRN Lacinda Thu ELIZABETH Meléndez     • metoprolol tartrate  50 mg Oral Q12H [address] Lacinda Thu [place], [place]     • remdesivir  100 mg Intravenous Q24H Lacinda ThuELIZABETH Saavedra     • sildenafil  20 mg Oral TID Lacinda Thu ELIZABETH Meléndez       Continuous Infusions:  heparin (porcine), 3-30 Units/kg/hr (Order-Specific), Last Rate: 23 1 Units/kg/hr (12/02/22 2353)      PRN Meds:   acetaminophen, 650 mg, Q6H PRN  albuterol, 2 puff, Q4H PRN  dextromethorphan-guaiFENesin, 10 mL, Q4H PRN  heparin (porcine), 2,000 Units, Q1H PRN  heparin (porcine), 4,000 Units, Q1H PRN        Invasive Devices Review  Invasive Devices     Peripheral Intravenous Line  Duration           Peripheral IV Distal;Left;Upper Forearm -- days    Peripheral IV 12/02/22 Distal;Dorsal (posterior); Right Forearm <1 day    Peripheral IV 12/03/22 Right Antecubital <1 day          Drain  Duration           Urethral Catheter <1 day                Rationale for remaining devices:  Not applicable  ---------------------------------------------------------------------------------------  Advance Directive and Living Will:      Power of :    POLST:    ---------------------------------------------------------------------------------------  Care Time Delivered:   Upon my evaluation, this patient had a high probability of imminent or life-threatening deterioration due to Acute hypoxemic and hypercarbic respiratory failure in the setting of COVID-19 pneumonia, which required my direct attention, intervention, and personal management  I have personally provided 45 minutes of critical care time, exclusive of procedures, teaching, family meetings, and any prior time recorded by providers other than myself  ELIZABETH Pérez      Portions of the record may have been created with voice recognition software  Occasional wrong word or "sound a like" substitutions may have occurred due to the inherent limitations of voice recognition software    Read the chart carefully and recognize, using context, where substitutions have occurred

## 2022-12-03 NOTE — ASSESSMENT & PLAN NOTE
Upgraded to severe pathway due to increasing oxygen requirements to high-flow nasal cannula, eventually required BiPAP overnight  Continue remdesivir and baricitinib, tocilizumab held due to administration of baricitinib  Increased Decadron to 0 1 milligrams/kg b i d    Bronchodilators as needed  Prone as tolerated

## 2022-12-03 NOTE — RESPIRATORY THERAPY NOTE
RT Ventilator Management Note  Josefa Villagran 46 y o  male MRN: [id number]  Unit/Bed#:  Encounter: [id number]      Daily Screen    No data found in the last 10 encounters             Physical Exam:   Assessment Type: Assess only  General Appearance: Sleeping  Respiratory Pattern: Assisted  Chest Assessment: Chest expansion symmetrical  Bilateral Breath Sounds: Coarse  O2 Device: v60 Eleven      Resp Comments: pt remains on bipap  no changes at this time       12/03/22 0735   Respiratory Assessment   Assessment Type Assess only   General Appearance Sleeping   Respiratory Pattern Assisted   Chest Assessment Chest expansion symmetrical   Bilateral Breath Sounds Coarse   Resp Comments pt remains on bipap  no changes at this time   Non-Invasive Information   O2 Interface Device Face mask   Non-Invasive Ventilation Mode BiPAP   $ Continous NIV Subsequent   SpO2 93 %   $ Pulse Oximetry Spot Check Charge Completed   Non-Invasive Settings   IPAP (cm) 25 cm   EPAP (cm) 15 cm   Rate (Set) 1   FiO2 (%) 75   Pressure Support (cm H2O) 10   Rise Time 2   Inspiratory Time (Set) 0 8   Temperature (Set) 31   Non-Invasive Readings   Skin Intervention Skin intact   Total Rate 14   Vt (mL) (Mech) 1314   MV (Mech) 16 9   Peak Pressure (Obs) 26   Heater Temperature (Obs) 31 2   Leak (lpm) 89   Spontaneous MV (mL) 1211   Non-Invasive Alarms   Insp Pressure High (cm H20) 35   Insp Pressure Low (cm H20) 8   Low Insp Pressure Time (sec) 20 sec   MV Low (L/min) 3   Vt High (mL) 1700   Vt Low (mL) 200   High Resp Rate (BPM) 40 BPM   Low Resp Rate (BPM) 8 BPM   Maintenance   Water bag changed No

## 2022-12-03 NOTE — RESPIRATORY THERAPY NOTE
RT Ventilator Management Note  Mekhi Simmons 46 y o  male MRN: [id number]  Unit/Bed#:  Encounter: [id number]      Daily Screen    No data found in the last 10 encounters  Physical Exam:   Assessment Type: Pre-treatment  General Appearance: Sleeping  Respiratory Pattern: Assisted  Chest Assessment: Chest expansion symmetrical  Bilateral Breath Sounds: Diminished, Inspiratory wheezes  O2 Device: v60 Eleven      Resp Comments: pt remains on bipap  no changes at this time       12/03/22 1320   Respiratory Assessment   Assessment Type Pre-treatment   General Appearance Sleeping   Respiratory Pattern Assisted   Chest Assessment Chest expansion symmetrical   Bilateral Breath Sounds Diminished; Inspiratory wheezes   Non-Invasive Information   O2 Interface Device Face mask   Non-Invasive Ventilation Mode BiPAP   SpO2 (!) 88 %   $ Pulse Oximetry Spot Check Charge Completed   Non-Invasive Settings   IPAP (cm) (S)  30 cm   EPAP (cm) 15 cm   Rate (Set) 10   FiO2 (%) (S)  100   Pressure Support (cm H2O) 15   Rise Time 2   Inspiratory Time (Set) 0 8   Temperature (Set) 31   Non-Invasive Readings   Skin Intervention Skin intact   Total Rate 11   Vt (mL) (Mech) 933   MV (Mech) 10   Peak Pressure (Obs) 23   Spontaneous Vt (mL) 765   Heater Temperature (Obs) 27 1   Leak (lpm) 59   Non-Invasive Alarms   Insp Pressure High (cm H20) 35   Insp Pressure Low (cm H20) 8   Low Insp Pressure Time (sec) 20 sec   MV Low (L/min) 3   Vt High (mL) 1700   Vt Low (mL) 200   High Resp Rate (BPM) 40 BPM   Low Resp Rate (BPM) 8 BPM   Maintenance   Water bag changed Yes

## 2022-12-03 NOTE — RESPIRATORY THERAPY NOTE
12/02/22 2048   Respiratory Assessment   General Appearance Drowsy   Respiratory Pattern Tachypneic;Spontaneous   Resp Comments placed pt on NIV upon arrival to ICU   O2 Device v60 Eleven   Non-Invasive Information   O2 Interface Device Face mask  (large)   Non-Invasive Ventilation Mode BiPAP   $ Continous NIV Initial   SpO2 98 %   $ Pulse Oximetry Spot Check Charge Completed   Non-Invasive Settings   IPAP (cm) 22 cm   EPAP (cm) 10 cm   Rate (Set) 10   FiO2 (%) 50   Rise Time 2   Inspiratory Time (Set) 0 8   Humidification   (heater)   Temperature (Set) 37   Non-Invasive Readings   Skin Intervention Skin intact   Total Rate 22   Vt (mL) (Mech) 596   MV (Mech) 13 1   Peak Pressure (Obs) 22   Heater Temperature (Obs)   (unit warming)   Leak (lpm) 17   Non-Invasive Alarms   Insp Pressure High (cm H20) 35   Insp Pressure Low (cm H20) 8   Low Insp Pressure Time (sec) 20 sec   MV Low (L/min) 3   Vt High (mL) 1200   Vt Low (mL) 200   High Resp Rate (BPM) 40 BPM   Low Resp Rate (BPM) 8 BPM   Pt received from [facility], placed pt on NIV at this time per/post ABG results, pt is cooperative and tolerant of NIV thus far, continue to monitor and wean/titrate as indicated

## 2022-12-03 NOTE — PLAN OF CARE
Problem: Potential for Falls  Goal: Patient will remain free of falls  Description: INTERVENTIONS:  - Educate patient/family on patient safety including physical limitations  - Instruct patient to call for assistance with activity   - Consult OT/PT to assist with strengthening/mobility   - Keep Call bell within reach  - Keep bed low and locked with side rails adjusted as appropriate  - Keep care items and personal belongings within reach  - Initiate and maintain comfort rounds  - Make Fall Risk Sign visible to staff  - Offer Toileting every *** Hours, in advance of need  - Initiate/Maintain ***alarm  - Obtain necessary fall risk management equipment: ***  - Apply yellow socks and bracelet for high fall risk patients  - Consider moving patient to room near nurses station  Outcome: Progressing     Problem: Nutrition/Hydration-ADULT  Goal: Nutrient/Hydration intake appropriate for improving, restoring or maintaining nutritional needs  Description: Monitor and assess patient's nutrition/hydration status for malnutrition  Collaborate with interdisciplinary team and initiate plan and interventions as ordered  Monitor patient's weight and dietary intake as ordered or per policy  Utilize nutrition screening tool and intervene as necessary  Determine patient's food preferences and provide high-protein, high-caloric foods as appropriate       INTERVENTIONS:  - Monitor oral intake, urinary output, labs, and treatment plans  - Assess nutrition and hydration status and recommend course of action  - Evaluate amount of meals eaten  - Assist patient with eating if necessary   - Allow adequate time for meals  - Recommend/ encourage appropriate diets, oral nutritional supplements, and vitamin/mineral supplements  - Order, calculate, and assess calorie counts as needed  - Recommend, monitor, and adjust tube feedings and TPN/PPN based on assessed needs  - Assess need for intravenous fluids  - Provide specific nutrition/hydration education as appropriate  - Include patient/family/caregiver in decisions related to nutrition  Outcome: Progressing     Problem: MOBILITY - ADULT  Goal: Maintain or return to baseline ADL function  Description: INTERVENTIONS:  -  Assess patient's ability to carry out ADLs; assess patient's baseline for ADL function and identify physical deficits which impact ability to perform ADLs (bathing, care of mouth/teeth, toileting, grooming, dressing, etc )  - Assess/evaluate cause of self-care deficits   - Assess range of motion  - Assess patient's mobility; develop plan if impaired  - Assess patient's need for assistive devices and provide as appropriate  - Encourage maximum independence but intervene and supervise when necessary  - Involve family in performance of ADLs  - Assess for home care needs following discharge   - Consider OT consult to assist with ADL evaluation and planning for discharge  - Provide patient education as appropriate  Outcome: Progressing  Goal: Maintains/Returns to pre admission functional level  Description: INTERVENTIONS:  - Perform BMAT or MOVE assessment daily    - Set and communicate daily mobility goal to care team and patient/family/caregiver  - Collaborate with rehabilitation services on mobility goals if consulted  - Perform Range of Motion *** times a day  - Reposition patient every *** hours    - Dangle patient *** times a day  - Stand patient *** times a day  - Ambulate patient *** times a day  - Out of bed to chair *** times a day   - Out of bed for meals *** times a day  - Out of bed for toileting  - Record patient progress and toleration of activity level   Outcome: Progressing     Problem: Prexisting or High Potential for Compromised Skin Integrity  Goal: Skin integrity is maintained or improved  Description: INTERVENTIONS:  - Identify patients at risk for skin breakdown  - Assess and monitor skin integrity  - Assess and monitor nutrition and hydration status  - Monitor labs   - Assess for incontinence   - Turn and reposition patient  - Assist with mobility/ambulation  - Relieve pressure over bony prominences  - Avoid friction and shearing  - Provide appropriate hygiene as needed including keeping skin clean and dry  - Evaluate need for skin moisturizer/barrier cream  - Collaborate with interdisciplinary team   - Patient/family teaching  - Consider wound care consult   Outcome: Progressing     Problem: CARDIOVASCULAR - ADULT  Goal: Maintains optimal cardiac output and hemodynamic stability  Description: INTERVENTIONS:  - Monitor I/O, vital signs and rhythm  - Monitor for S/S and trends of decreased cardiac output  - Administer and titrate ordered vasoactive medications to optimize hemodynamic stability  - Assess quality of pulses, skin color and temperature  - Assess for signs of decreased coronary artery perfusion  - Instruct patient to report change in severity of symptoms  Outcome: Progressing  Goal: Absence of cardiac dysrhythmias or at baseline rhythm  Description: INTERVENTIONS:  - Continuous cardiac monitoring, vital signs, obtain 12 lead EKG if ordered  - Administer antiarrhythmic and heart rate control medications as ordered  - Monitor electrolytes and administer replacement therapy as ordered  Outcome: Progressing     Problem: RESPIRATORY - ADULT  Goal: Achieves optimal ventilation and oxygenation  Description: INTERVENTIONS:  - Assess for changes in respiratory status  - Assess for changes in mentation and behavior  - Position to facilitate oxygenation and minimize respiratory effort  - Oxygen administered by appropriate delivery if ordered  - Initiate smoking cessation education as indicated  - Encourage broncho-pulmonary hygiene including cough, deep breathe, Incentive Spirometry  - Assess the need for suctioning and aspirate as needed  - Assess and instruct to report SOB or any respiratory difficulty  - Respiratory Therapy support as indicated  Outcome: Progressing Problem: GENITOURINARY - ADULT  Goal: Urinary catheter remains patent  Description: INTERVENTIONS:  - Assess patency of urinary catheter  - If patient has a chronic diallo, consider changing catheter if non-functioning  - Follow guidelines for intermittent irrigation of non-functioning urinary catheter  Outcome: Progressing     Problem: METABOLIC, FLUID AND ELECTROLYTES - ADULT  Goal: Electrolytes maintained within normal limits  Description: INTERVENTIONS:  - Monitor labs and assess patient for signs and symptoms of electrolyte imbalances  - Administer electrolyte replacement as ordered  - Monitor response to electrolyte replacements, including repeat lab results as appropriate  - Instruct patient on fluid and nutrition as appropriate  Outcome: Progressing  Goal: Glucose maintained within target range  Description: INTERVENTIONS:  - Monitor Blood Glucose as ordered  - Assess for signs and symptoms of hyperglycemia and hypoglycemia  - Administer ordered medications to maintain glucose within target range  - Assess nutritional intake and initiate nutrition service referral as needed  Outcome: Progressing

## 2022-12-04 ENCOUNTER — APPOINTMENT (INPATIENT)
Dept: RADIOLOGY | Facility: HOSPITAL | Age: 52
End: 2022-12-04

## 2022-12-04 LAB
ALBUMIN SERPL BCP-MCNC: 3.2 G/DL (ref 3.5–5)
ALP SERPL-CCNC: 49 U/L (ref 46–116)
ALT SERPL W P-5'-P-CCNC: 32 U/L (ref 12–78)
ANION GAP SERPL CALCULATED.3IONS-SCNC: 4 MMOL/L (ref 4–13)
ANION GAP SERPL CALCULATED.3IONS-SCNC: 5 MMOL/L (ref 4–13)
ANION GAP SERPL CALCULATED.3IONS-SCNC: 6 MMOL/L (ref 4–13)
ANION GAP SERPL CALCULATED.3IONS-SCNC: 6 MMOL/L (ref 4–13)
AST SERPL W P-5'-P-CCNC: 36 U/L (ref 5–45)
BASE EXCESS BLDA CALC-SCNC: 10.3 MMOL/L
BASE EXCESS BLDA CALC-SCNC: 13.1 MMOL/L
BASE EXCESS BLDA CALC-SCNC: 8.2 MMOL/L
BASE EXCESS BLDA CALC-SCNC: 9.7 MMOL/L
BASOPHILS # BLD AUTO: 0 THOUSANDS/ÂΜL (ref 0–0.1)
BASOPHILS NFR BLD AUTO: 0 % (ref 0–1)
BILIRUB SERPL-MCNC: 0.52 MG/DL (ref 0.2–1)
BODY TEMPERATURE: 98.2 DEGREES FEHRENHEIT
BODY TEMPERATURE: 99.1 DEGREES FEHRENHEIT
BUN SERPL-MCNC: 39 MG/DL (ref 5–25)
BUN SERPL-MCNC: 40 MG/DL (ref 5–25)
BUN SERPL-MCNC: 42 MG/DL (ref 5–25)
BUN SERPL-MCNC: 43 MG/DL (ref 5–25)
CALCIUM ALBUM COR SERPL-MCNC: 9.7 MG/DL (ref 8.3–10.1)
CALCIUM SERPL-MCNC: 8.8 MG/DL (ref 8.3–10.1)
CALCIUM SERPL-MCNC: 9 MG/DL (ref 8.3–10.1)
CALCIUM SERPL-MCNC: 9.1 MG/DL (ref 8.3–10.1)
CALCIUM SERPL-MCNC: 9.1 MG/DL (ref 8.3–10.1)
CHLORIDE SERPL-SCNC: 103 MMOL/L (ref 96–108)
CHLORIDE SERPL-SCNC: 103 MMOL/L (ref 96–108)
CHLORIDE SERPL-SCNC: 104 MMOL/L (ref 96–108)
CHLORIDE SERPL-SCNC: 105 MMOL/L (ref 96–108)
CO2 SERPL-SCNC: 38 MMOL/L (ref 21–32)
CO2 SERPL-SCNC: 39 MMOL/L (ref 21–32)
CO2 SERPL-SCNC: 40 MMOL/L (ref 21–32)
CO2 SERPL-SCNC: 41 MMOL/L (ref 21–32)
CREAT SERPL-MCNC: 1.25 MG/DL (ref 0.6–1.3)
CREAT SERPL-MCNC: 1.29 MG/DL (ref 0.6–1.3)
CREAT SERPL-MCNC: 1.32 MG/DL (ref 0.6–1.3)
CREAT SERPL-MCNC: 1.36 MG/DL (ref 0.6–1.3)
EOSINOPHIL # BLD AUTO: 0 THOUSAND/ÂΜL (ref 0–0.61)
EOSINOPHIL NFR BLD AUTO: 0 % (ref 0–6)
ERYTHROCYTE [DISTWIDTH] IN BLOOD BY AUTOMATED COUNT: 16.6 % (ref 11.6–15.1)
GFR SERPL CREATININE-BSD FRML MDRD: 59 ML/MIN/1.73SQ M
GFR SERPL CREATININE-BSD FRML MDRD: 61 ML/MIN/1.73SQ M
GFR SERPL CREATININE-BSD FRML MDRD: 63 ML/MIN/1.73SQ M
GFR SERPL CREATININE-BSD FRML MDRD: 65 ML/MIN/1.73SQ M
GLUCOSE SERPL-MCNC: 150 MG/DL (ref 65–140)
GLUCOSE SERPL-MCNC: 154 MG/DL (ref 65–140)
GLUCOSE SERPL-MCNC: 164 MG/DL (ref 65–140)
GLUCOSE SERPL-MCNC: 167 MG/DL (ref 65–140)
HCO3 BLDA-SCNC: 38 MMOL/L (ref 22–28)
HCO3 BLDA-SCNC: 39.4 MMOL/L (ref 22–28)
HCO3 BLDA-SCNC: 40 MMOL/L (ref 22–28)
HCO3 BLDA-SCNC: 43.3 MMOL/L (ref 22–28)
HCT VFR BLD AUTO: 49.1 % (ref 36.5–49.3)
HFNC FLOW LPM: 60
HGB BLD-MCNC: 13.8 G/DL (ref 12–17)
IMM GRANULOCYTES # BLD AUTO: 0.06 THOUSAND/UL (ref 0–0.2)
IMM GRANULOCYTES NFR BLD AUTO: 1 % (ref 0–2)
IPAP: 25
LYMPHOCYTES # BLD AUTO: 0.52 THOUSANDS/ÂΜL (ref 0.6–4.47)
LYMPHOCYTES NFR BLD AUTO: 6 % (ref 14–44)
MAGNESIUM SERPL-MCNC: 2.2 MG/DL (ref 1.6–2.6)
MCH RBC QN AUTO: 24.7 PG (ref 26.8–34.3)
MCHC RBC AUTO-ENTMCNC: 28.1 G/DL (ref 31.4–37.4)
MCV RBC AUTO: 88 FL (ref 82–98)
MONOCYTES # BLD AUTO: 0.38 THOUSAND/ÂΜL (ref 0.17–1.22)
MONOCYTES NFR BLD AUTO: 4 % (ref 4–12)
NEUTROPHILS # BLD AUTO: 8.57 THOUSANDS/ÂΜL (ref 1.85–7.62)
NEUTS SEG NFR BLD AUTO: 89 % (ref 43–75)
NON VENT HFNC FIO2: 80
NON VENT TYPE HFNC: ABNORMAL
NON VENT- BIPAP: ABNORMAL
NRBC BLD AUTO-RTO: 0 /100 WBCS
O2 CT BLDA-SCNC: 19.1 ML/DL (ref 16–23)
O2 CT BLDA-SCNC: 19.6 ML/DL (ref 16–23)
O2 CT BLDA-SCNC: 19.9 ML/DL (ref 16–23)
O2 CT BLDA-SCNC: 20 ML/DL (ref 16–23)
OXYHGB MFR BLDA: 94.7 % (ref 94–97)
OXYHGB MFR BLDA: 95 % (ref 94–97)
OXYHGB MFR BLDA: 96.4 % (ref 94–97)
OXYHGB MFR BLDA: 96.7 % (ref 94–97)
PCO2 BLDA: 78.8 MM HG (ref 36–44)
PCO2 BLDA: 79.4 MM HG (ref 36–44)
PCO2 BLDA: 80.2 MM HG (ref 36–44)
PCO2 BLDA: 83.8 MM HG (ref 36–44)
PEEP MAX SETTING VENT: 15 CM[H2O]
PH BLDA: 7.3 [PH] (ref 7.35–7.45)
PH BLDA: 7.31 [PH] (ref 7.35–7.45)
PH BLDA: 7.32 [PH] (ref 7.35–7.45)
PH BLDA: 7.33 [PH] (ref 7.35–7.45)
PHOSPHATE SERPL-MCNC: 4.6 MG/DL (ref 2.7–4.5)
PLATELET # BLD AUTO: 229 THOUSANDS/UL (ref 149–390)
PMV BLD AUTO: 9.2 FL (ref 8.9–12.7)
PO2 BLDA: 111 MM HG (ref 75–129)
PO2 BLDA: 84.5 MM HG (ref 75–129)
PO2 BLDA: 88.5 MM HG (ref 75–129)
PO2 BLDA: 99.5 MM HG (ref 75–129)
POTASSIUM SERPL-SCNC: 4.5 MMOL/L (ref 3.5–5.3)
POTASSIUM SERPL-SCNC: 4.6 MMOL/L (ref 3.5–5.3)
POTASSIUM SERPL-SCNC: 4.7 MMOL/L (ref 3.5–5.3)
POTASSIUM SERPL-SCNC: 4.8 MMOL/L (ref 3.5–5.3)
PROT SERPL-MCNC: 6.8 G/DL (ref 6.4–8.4)
RBC # BLD AUTO: 5.58 MILLION/UL (ref 3.88–5.62)
SODIUM SERPL-SCNC: 146 MMOL/L (ref 135–147)
SODIUM SERPL-SCNC: 149 MMOL/L (ref 135–147)
SODIUM SERPL-SCNC: 149 MMOL/L (ref 135–147)
SODIUM SERPL-SCNC: 150 MMOL/L (ref 135–147)
SPECIMEN SOURCE: ABNORMAL
VENT BIPAP FIO2: 90 %
VENT BIPAP FIO2: 90 %
VENT BIPAP FIO2: 95 %
WBC # BLD AUTO: 9.53 THOUSAND/UL (ref 4.31–10.16)

## 2022-12-04 RX ORDER — ALBUMIN (HUMAN) 12.5 G/50ML
12.5 SOLUTION INTRAVENOUS EVERY 6 HOURS
Status: COMPLETED | OUTPATIENT
Start: 2022-12-04 | End: 2022-12-04

## 2022-12-04 RX ADMIN — ENOXAPARIN SODIUM 60 MG: 60 INJECTION SUBCUTANEOUS at 21:42

## 2022-12-04 RX ADMIN — ISODIUM CHLORIDE 3 ML: 0.03 SOLUTION RESPIRATORY (INHALATION) at 13:18

## 2022-12-04 RX ADMIN — ISODIUM CHLORIDE 3 ML: 0.03 SOLUTION RESPIRATORY (INHALATION) at 19:48

## 2022-12-04 RX ADMIN — LEVALBUTEROL HYDROCHLORIDE 1.25 MG: 1.25 SOLUTION, CONCENTRATE RESPIRATORY (INHALATION) at 19:48

## 2022-12-04 RX ADMIN — DEXAMETHASONE SODIUM PHOSPHATE 18.4 MG: 10 INJECTION, SOLUTION INTRAMUSCULAR; INTRAVENOUS at 20:30

## 2022-12-04 RX ADMIN — SILDENAFIL 20 MG: 20 TABLET ORAL at 21:38

## 2022-12-04 RX ADMIN — ISODIUM CHLORIDE 3 ML: 0.03 SOLUTION RESPIRATORY (INHALATION) at 07:12

## 2022-12-04 RX ADMIN — EPOPROSTENOL 49.8 NG/KG/MIN: 1.5 INJECTION, POWDER, LYOPHILIZED, FOR SOLUTION INTRAVENOUS at 00:44

## 2022-12-04 RX ADMIN — METOROPROLOL TARTRATE 10 MG: 5 INJECTION, SOLUTION INTRAVENOUS at 21:42

## 2022-12-04 RX ADMIN — ALBUMIN (HUMAN) 12.5 G: 0.25 INJECTION, SOLUTION INTRAVENOUS at 17:20

## 2022-12-04 RX ADMIN — ENOXAPARIN SODIUM 60 MG: 60 INJECTION SUBCUTANEOUS at 08:05

## 2022-12-04 RX ADMIN — EPOPROSTENOL 50 NG/KG/MIN: 1.5 INJECTION, POWDER, LYOPHILIZED, FOR SOLUTION INTRAVENOUS at 13:19

## 2022-12-04 RX ADMIN — ALBUMIN (HUMAN) 12.5 G: 0.25 INJECTION, SOLUTION INTRAVENOUS at 00:58

## 2022-12-04 RX ADMIN — ALBUMIN (HUMAN) 12.5 G: 0.25 INJECTION, SOLUTION INTRAVENOUS at 11:51

## 2022-12-04 RX ADMIN — LEVALBUTEROL HYDROCHLORIDE 1.25 MG: 1.25 SOLUTION, CONCENTRATE RESPIRATORY (INHALATION) at 07:12

## 2022-12-04 RX ADMIN — ALBUMIN (HUMAN) 12.5 G: 0.25 INJECTION, SOLUTION INTRAVENOUS at 05:41

## 2022-12-04 RX ADMIN — REMDESIVIR 100 MG: 100 INJECTION, POWDER, LYOPHILIZED, FOR SOLUTION INTRAVENOUS at 00:59

## 2022-12-04 RX ADMIN — METOROPROLOL TARTRATE 10 MG: 5 INJECTION, SOLUTION INTRAVENOUS at 09:53

## 2022-12-04 RX ADMIN — LEVALBUTEROL HYDROCHLORIDE 1.25 MG: 1.25 SOLUTION, CONCENTRATE RESPIRATORY (INHALATION) at 13:18

## 2022-12-04 RX ADMIN — METOROPROLOL TARTRATE 10 MG: 5 INJECTION, SOLUTION INTRAVENOUS at 05:30

## 2022-12-04 RX ADMIN — EPOPROSTENOL 49.8 NG/KG/MIN: 1.5 INJECTION, POWDER, LYOPHILIZED, FOR SOLUTION INTRAVENOUS at 19:55

## 2022-12-04 RX ADMIN — DEXAMETHASONE SODIUM PHOSPHATE 18.4 MG: 10 INJECTION, SOLUTION INTRAMUSCULAR; INTRAVENOUS at 08:05

## 2022-12-04 RX ADMIN — FUROSEMIDE 40 MG: 10 INJECTION, SOLUTION INTRAMUSCULAR; INTRAVENOUS at 17:25

## 2022-12-04 RX ADMIN — EPOPROSTENOL 50 NG/KG/MIN: 1.5 INJECTION, POWDER, LYOPHILIZED, FOR SOLUTION INTRAVENOUS at 07:10

## 2022-12-04 RX ADMIN — FUROSEMIDE 40 MG: 10 INJECTION, SOLUTION INTRAMUSCULAR; INTRAVENOUS at 08:05

## 2022-12-04 RX ADMIN — METOROPROLOL TARTRATE 10 MG: 5 INJECTION, SOLUTION INTRAVENOUS at 17:25

## 2022-12-04 NOTE — PROGRESS NOTES
[address] [place] [address]  Progress Note Jennifer Corona 1970, 46 y o  male MRN: [id number]  Unit/Bed#:  Encounter: [id number]  Primary Care Provider: No primary care provider on file  Date and time admitted to hospital: 12/1/2022  5:52 PM    Elevated serum creatinine  Assessment & Plan  Unclear if Cardiorenal or if LINDEN  Continue to monitor I&O and renal indices  Diurese as tolerated    Pulmonary hypertension (HCC)  Assessment & Plan  Continue sildenafil and epoprostenol    Obstructive sleep apnea  Assessment & Plan  Currently on continuous BiPAP  Continue BiPAP at HS once weaned from positive pressure ventilation    Morbid obesity ([address] )  Assessment & Plan  Nutrition consulted  Encourage lifestyle modifications    Mixed hyperlipidemia  Assessment & Plan  Continue home statin    CHF (congestive heart failure) ([facility])  Assessment & Plan  Wt Readings from Last 3 Encounters:   12/02/22 (!) 184 kg (406 lb)     Continue Lasix b i d  Daily weights  Continue epoprostenol to offload right heart      * Acute respiratory failure due to COVID-19 [facility])  Assessment & Plan  Continues to require BiPAP  Continue remdesivir and baricitinib, tocilizumab held due to administration of baricitinib  Continue Decadron to 0 1 milligrams/kg b i d  Bronchodilators as needed  Prone as tolerated          ----------------------------------------------------------------------------------------  HPI/24hr events:  Hemodynamically stable overnight, however respiratory status remains very tenuous on high BiPAP settings of 25/15 and 90% FiO2  Epoprostenol continued  Diuresis continues      Patient appropriate for transfer out of the ICU today?: No  Disposition: Continue Critical Care   Code Status: Level 1 - Full Code  ---------------------------------------------------------------------------------------  SUBJECTIVE  No complaints offered overnight    Review of Systems  Review of systems was reviewed and negative unless stated above in HPI/24-hour events   ---------------------------------------------------------------------------------------  OBJECTIVE    Vitals   Vitals:    22 0000 22 0044   BP:       BP Location:       Pulse:  (!) 135  (!) 137   Resp:     Temp:  98 6 °F (37 °C) 99 1 °F (37 3 °C)    TempSrc:       SpO2: 95% 91%  92%   Weight:       Height:         Temp (24hrs), Av 9 °F (37 2 °C), Min:98 4 °F (36 9 °C), Max:99 9 °F (37 7 °C)  Current: Temperature: 99 1 °F (37 3 °C)  Arterial Line BP: 121/68  Arterial Line MAP (mmHg): 92 mmHg    Respiratory:  SpO2: SpO2: 92 %  Nasal Cannula O2 Flow Rate (L/min): (S) 15 L/min    Invasive/non-invasive ventilation settings   Respiratory    Lab Data (Last 4 hours)       0109            pH, Arterial       7 314             pCO2, Arterial       79 4             pO2, Arterial       88 5             HCO3, Arterial       39 4             Base Excess, Arterial       9 7                  O2/Vent Data       44   Most Recent        Non-Invasive Ventilation Mode BiPAP  BiPAP                  Physical Exam  GEN:  Morbidly obese, ill-appearing  HEENT:  Sclera anicteric, mucous membranes pink and moist, conjunctiva pink, no marguerite/rhinorrhea  CV :  S1S2, regular, tachycardic, no murmurs, rubs or gallops appreciated  Intact distal pulses  Resp:  Lungs diminished throughout  No subq air or crepitus  Symmetrical expansion  No cough noted    GI :  Abd soft, nontender, no guarding/rebound, nondistended, hypoactive bowel sounds X4 quads  Neuro:  CN II-XII grossly intact, nonfocal exam, speech clear, GCS 15      Laboratory and Diagnostics:  Results from last 7 days   Lab Units 22  0458 22  0218 12/22  2155 22  0448 22  1822   WBC Thousand/uL 10 38*  --   --   --  10 64* 9 50   HEMOGLOBIN g/dL 13 6  --   --   --  14 4 15 2   I STAT HEMOGLOBIN g/dl  --  15 6 15 6 15 3  --   --    HEMATOCRIT % 49 3  -- --   --  51 5* 52 3*   HEMATOCRIT, ISTAT %  --  46 46 45  --   --    PLATELETS Thousands/uL 232  --   --   --  232 269   NEUTROS PCT % 90*  --   --   --   --  65   MONOS PCT % 2*  --   --   --   --  12     Results from last 7 days   Lab Units 12/04/22  0110 12/03/22  1814 12/03/22  1054 12/03/22  0458 12/02/22  2155 12/02/22  0448 12/01/22  1822   SODIUM mmol/L 146 145 146 147  --  146 145   POTASSIUM mmol/L 4 8 4 8 5 0 5 5*  --  5 0 4 5   CHLORIDE mmol/L 103 102 104 103  --  106 105   CO2 mmol/L 38* 37* 36* 37*  --  33* 33*   CO2, I-STAT mmol/L  --   --   --   --  42*  --   --    ANION GAP mmol/L 5 6 6 7  --  7 7   BUN mg/dL 40* 36* 33* 32*  --  33* 37*   CREATININE mg/dL 1 36* 1 37* 1 34* 1 31*  --  1 48* 1 39*   CALCIUM mg/dL 8 8 8 9 8 6 8 4  --  8 6 8 7   GLUCOSE RANDOM mg/dL 150* 156* 154* 146*  --  129 113   ALT U/L  --   --   --  28  --   --  36   AST U/L  --   --   --  22  --   --  24   ALK PHOS U/L  --   --   --  54  --   --  68   ALBUMIN g/dL  --   --   --  3 0*  --   --  3 2*   TOTAL BILIRUBIN mg/dL  --   --   --  0 50  --   --  0 57     Results from last 7 days   Lab Units 12/03/22 0458 12/01/22  1822   MAGNESIUM mg/dL 2 0 2 2   PHOSPHORUS mg/dL 4 2  --       Results from last 7 days   Lab Units 12/03/22 0458 12/03/22  0049 12/02/22  2301 12/02/22  1256 12/02/22  0532 12/01/22  2238   INR   --   --   --   --   --  1 18   PTT seconds 62* 69* 36 32 28 22*              ABG:  Results from last 7 days   Lab Units 12/04/22  0109   PH ART  7 314*   PCO2 ART mm Hg 79 4*   PO2 ART mm Hg 88 5   HCO3 ART mmol/L 39 4*   BASE EXC ART mmol/L 9 7   ABG SOURCE  Line, Arterial     VBG:  Results from last 7 days   Lab Units 12/04/22  0109 12/03/22  1435 12/03/22  1054   PH HERNESTO   --   --  7 250*   PCO2 HERNESTO mm Hg  --   --  98 1*   PO2 HERNESTO mm Hg  --   --  63 7*   HCO3 HERNESTO mmol/L  --   --  42 1*   BASE EXC HERNESTO mmol/L  --   --  10 2   ABG SOURCE  Line, Arterial   < >  --     < > = values in this interval not displayed  Results from last 7 days   Lab Units 12/03/22  0458 12/02/22  1256   PROCALCITONIN ng/ml 0 07 0 11       Micro        EKG:  Sinus tach  Imaging: I have personally reviewed pertinent reports  and I have personally reviewed pertinent films in PACS    Intake and Output  I/O       12/02 0701 12/03 0700 12/03 0701 12/04 0700    I V  (mL/kg) 412 2 (2 2) 118 2 (0 6)    IV Piggyback 270 200    Total Intake(mL/kg) 682 2 (3 7) 318 2 (1 7)    Urine (mL/kg/hr) 3010 (0 7) 5325 (1 2)    Total Output 3010 5325    Net 4167 6 -1118 8                Height and Weights   Height: 5' 11" (180 3 cm)  IBW (Ideal Body Weight): 75 3 kg  Body mass index is 56 63 kg/m²  Weight (last 2 days)     Date/Time Weight    12/02/22 1430 184 (406)            Nutrition       Diet Orders   (From admission, onward)             Start     Ordered    12/02/22 1841  Diet NPO  Diet effective now        References:    Nutrtion Support Algorithm Enteral vs  Parenteral   Question Answer Comment   Diet Type NPO    RD to adjust diet per protocol?  No        12/02/22 1840                  Active Medications  Scheduled Meds:  Current Facility-Administered Medications   Medication Dose Route Frequency Provider Last Rate   • acetaminophen  650 mg Oral Q6H PRN ELIZABETH Griamldo     • albumin human  12 5 g Intravenous Q6H ELIZABETH Farias     • albuterol  2 puff Inhalation Q4H PRN ELIZABETH Grimaldo     • atorvastatin  10 mg Oral Daily With [employer] ELIZABETH Meléndez     • baricitinib  2 mg Oral Q24H ELIZABETH Farias     • dexamethasone  0 1 mg/kg Intravenous Q12H ELIZABETH Farias 18 4 mg (12/03/22 2030)   • dextromethorphan-guaiFENesin  10 mL Oral Q4H PRN ELIZABETH Grimaldo     • enoxaparin  60 mg Subcutaneous Q12H [facility] ELIZABETH Lamas     • epoprostenol  6 25-50 ng/kg/min (Ideal) Inhalation Titrated Charley Manzano DO 49 801 ng/kg/min (12/04/22 7623)   • furosemide  40 mg Intravenous BID (diuretic) [facility], ELIZABETH     • levalbuterol  1 25 mg Nebulization TID ELIZABETH Grant     • metoprolol  10 mg Intravenous Q6H oSnido Manuel DO     • remdesivir  100 mg Intravenous Q24H [facility], ELIZABETH     • sildenafil  20 mg Oral TID [facility] ELIZABETH Meléndez     • sodium chloride  3 mL Nebulization TID ELIZABETH Grant       Continuous Infusions:  epoprostenol, 6 25-50 ng/kg/min (Ideal), Last Rate: 49 801 ng/kg/min (12/04/22 0044)      PRN Meds:   acetaminophen, 650 mg, Q6H PRN  albuterol, 2 puff, Q4H PRN  dextromethorphan-guaiFENesin, 10 mL, Q4H PRN        Invasive Devices Review  Invasive Devices     Peripheral Intravenous Line  Duration           Peripheral IV Distal;Left;Upper Forearm -- days    Peripheral IV 12/03/22 Right Antecubital 1 day    Peripheral IV 12/03/22 Left;Ventral (anterior) Forearm <1 day          Arterial Line  Duration           Arterial Line 12/03/22 Radial <1 day          Drain  Duration           Urethral Catheter 1 day                Rationale for remaining devices:  Critically ill  ---------------------------------------------------------------------------------------  Advance Directive and Living Will:      Power of :    POLST:    ---------------------------------------------------------------------------------------  Care Time Delivered:   Upon my evaluation, this patient had a high probability of imminent or life-threatening deterioration due to Multi-system dysfunction and acute hypoxemic and hypercarbic respiratory failure with tenuous respiratory status, which required my direct attention, intervention, and personal management  I have personally provided 35 minutes of critical care time, exclusive of procedures, teaching, family meetings, and any prior time recorded by providers other than myself         ELIZABETH Pérez      Portions of the record may have been created with voice recognition software  Occasional wrong word or "sound a like" substitutions may have occurred due to the inherent limitations of voice recognition software    Read the chart carefully and recognize, using context, where substitutions have occurred

## 2022-12-04 NOTE — ASSESSMENT & PLAN NOTE
Continues to require BiPAP  Continue remdesivir and baricitinib, tocilizumab held due to administration of baricitinib  Continue Decadron to 0 1 milligrams/kg b i d    Bronchodilators as needed  Prone as tolerated

## 2022-12-04 NOTE — RESPIRATORY THERAPY NOTE
RT Ventilator Management Note  Mekhi Simmons 46 y o  male MRN: [id number]  Unit/Bed#:  Encounter: [id number]      Daily Screen    No data found in the last 10 encounters             Physical Exam:   Assessment Type: Pre-treatment  General Appearance: Sleeping  Respiratory Pattern: Assisted  Chest Assessment: Chest expansion symmetrical  Bilateral Breath Sounds: Diminished, Coarse      Resp Comments: transitioned pt to HFNC       12/04/22 1654   Non-Invasive Information   O2 Interface Device HFNC prongs   Non-Invasive Ventilation Mode HFNC (High flow)   SpO2 95 %   $ Pulse Oximetry Spot Check Charge Completed   Non-Invasive Settings   FiO2 (%) (S)  80   Flow (lpm) 60   Temperature (Set) 31   Non-Invasive Readings   Skin Intervention Skin intact   Heater Temperature (Obs) 30 3   Maintenance   Water bag changed No

## 2022-12-04 NOTE — RESPIRATORY THERAPY NOTE
RT Ventilator Management Note  Pipo Pierre 46 y o  male MRN: [id number]  Unit/Bed#:  Encounter: [id number]      Daily Screen    No data found in the last 10 encounters             Physical Exam:   Assessment Type: Pre-treatment  General Appearance: Sleeping  Respiratory Pattern: Assisted  Chest Assessment: Chest expansion symmetrical  Bilateral Breath Sounds: Diminished, Coarse  Cough: None  O2 Device: v60  Subjective Data: sleeping      Resp Comments: pt remains on bipap no changes at this time  newvelitri syringe started       12/04/22 0712   Respiratory Assessment   Assessment Type Pre-treatment   General Appearance Sleeping   Respiratory Pattern Assisted   Chest Assessment Chest expansion symmetrical   Bilateral Breath Sounds Diminished;Coarse   Resp Comments pt remains on bipap no changes at this time  newvelitri syringe started   Non-Invasive Information   O2 Interface Device Face mask   Non-Invasive Ventilation Mode BiPAP   $ Continous NIV Subsequent   SpO2 94 %   $ Pulse Oximetry Spot Check Charge Completed   Non-Invasive Settings   IPAP (cm) 25 cm   EPAP (cm) 15 cm   Rate (Set) 10   FiO2 (%) 90   Pressure Support (cm H2O) 10   Rise Time 2   Inspiratory Time (Set) 0 8   Temperature (Set) 31   Non-Invasive Readings   Skin Intervention Skin intact   Total Rate 10   Vt (mL) (Mech) 922   MV (Mech) 8 5   Peak Pressure (Obs) 25   Spontaneous Vt (mL) 901   Heater Temperature (Obs) 29 5   Leak (lpm) 132   Non-Invasive Alarms   Insp Pressure High (cm H20) 35   Insp Pressure Low (cm H20) 8   Low Insp Pressure Time (sec) 20 sec   MV Low (L/min) 3   Vt High (mL) 1700   Vt Low (mL) 200   High Resp Rate (BPM) 30 BPM   Low Resp Rate (BPM) 8 BPM   Apnea Interval (sec) 20   Apnea Rate 10   Maintenance   Water bag changed No

## 2022-12-04 NOTE — ASSESSMENT & PLAN NOTE
Wt Readings from Last 3 Encounters:   12/02/22 (!) 184 kg (406 lb)     Continue Lasix b i d    Daily weights  Continue epoprostenol to offload right heart

## 2022-12-04 NOTE — PLAN OF CARE
Problem: Potential for Falls  Goal: Patient will remain free of falls  Description: INTERVENTIONS:  - Educate patient/family on patient safety including physical limitations  - Instruct patient to call for assistance with activity   - Consult OT/PT to assist with strengthening/mobility   - Keep Call bell within reach  - Keep bed low and locked with side rails adjusted as appropriate  - Keep care items and personal belongings within reach  - Initiate and maintain comfort rounds  - Make Fall Risk Sign visible to staff  - Offer Toileting every 2 Hours, in advance of need  - Initiate/Maintain bed alarm  - Obtain necessary fall risk management equipment  - Apply yellow socks and bracelet for high fall risk patients  - Consider moving patient to room near nurses station  Outcome: Progressing     Problem: Nutrition/Hydration-ADULT  Goal: Nutrient/Hydration intake appropriate for improving, restoring or maintaining nutritional needs  Description: Monitor and assess patient's nutrition/hydration status for malnutrition  Collaborate with interdisciplinary team and initiate plan and interventions as ordered  Monitor patient's weight and dietary intake as ordered or per policy  Utilize nutrition screening tool and intervene as necessary  Determine patient's food preferences and provide high-protein, high-caloric foods as appropriate       INTERVENTIONS:  - Monitor oral intake, urinary output, labs, and treatment plans  - Assess nutrition and hydration status and recommend course of action  - Evaluate amount of meals eaten  - Assist patient with eating if necessary   - Allow adequate time for meals  - Recommend/ encourage appropriate diets, oral nutritional supplements, and vitamin/mineral supplements  - Order, calculate, and assess calorie counts as needed  - Recommend, monitor, and adjust tube feedings and TPN/PPN based on assessed needs  - Assess need for intravenous fluids  - Provide specific nutrition/hydration education as appropriate  - Include patient/family/caregiver in decisions related to nutrition  Outcome: Progressing     Problem: MOBILITY - ADULT  Goal: Maintain or return to baseline ADL function  Description: INTERVENTIONS:  -  Assess patient's ability to carry out ADLs; assess patient's baseline for ADL function and identify physical deficits which impact ability to perform ADLs (bathing, care of mouth/teeth, toileting, grooming, dressing, etc )  - Assess/evaluate cause of self-care deficits   - Assess range of motion  - Assess patient's mobility; develop plan if impaired  - Assess patient's need for assistive devices and provide as appropriate  - Encourage maximum independence but intervene and supervise when necessary  - Involve family in performance of ADLs  - Assess for home care needs following discharge   - Consider OT consult to assist with ADL evaluation and planning for discharge  - Provide patient education as appropriate  Outcome: Progressing  Goal: Maintains/Returns to pre admission functional level  Description: INTERVENTIONS:  - Perform BMAT or MOVE assessment daily    - Set and communicate daily mobility goal to care team and patient/family/caregiver     - Collaborate with rehabilitation services on mobility goals if consulted  - Record patient progress and toleration of activity level   Outcome: Progressing     Problem: Prexisting or High Potential for Compromised Skin Integrity  Goal: Skin integrity is maintained or improved  Description: INTERVENTIONS:  - Identify patients at risk for skin breakdown  - Assess and monitor skin integrity  - Assess and monitor nutrition and hydration status  - Monitor labs   - Assess for incontinence   - Turn and reposition patient  - Assist with mobility/ambulation  - Relieve pressure over bony prominences  - Avoid friction and shearing  - Provide appropriate hygiene as needed including keeping skin clean and dry  - Evaluate need for skin moisturizer/barrier cream  - Collaborate with interdisciplinary team   - Patient/family teaching  - Consider wound care consult   Outcome: Progressing     Problem: CARDIOVASCULAR - ADULT  Goal: Maintains optimal cardiac output and hemodynamic stability  Description: INTERVENTIONS:  - Monitor I/O, vital signs and rhythm  - Monitor for S/S and trends of decreased cardiac output  - Administer and titrate ordered vasoactive medications to optimize hemodynamic stability  - Assess quality of pulses, skin color and temperature  - Assess for signs of decreased coronary artery perfusion  - Instruct patient to report change in severity of symptoms  Outcome: Progressing  Goal: Absence of cardiac dysrhythmias or at baseline rhythm  Description: INTERVENTIONS:  - Continuous cardiac monitoring, vital signs, obtain 12 lead EKG if ordered  - Administer antiarrhythmic and heart rate control medications as ordered  - Monitor electrolytes and administer replacement therapy as ordered  Outcome: Progressing     Problem: RESPIRATORY - ADULT  Goal: Achieves optimal ventilation and oxygenation  Description: INTERVENTIONS:  - Assess for changes in respiratory status  - Assess for changes in mentation and behavior  - Position to facilitate oxygenation and minimize respiratory effort  - Oxygen administered by appropriate delivery if ordered  - Initiate smoking cessation education as indicated  - Encourage broncho-pulmonary hygiene including cough, deep breathe, Incentive Spirometry  - Assess the need for suctioning and aspirate as needed  - Assess and instruct to report SOB or any respiratory difficulty  - Respiratory Therapy support as indicated  Outcome: Progressing     Problem: GENITOURINARY - ADULT  Goal: Urinary catheter remains patent  Description: INTERVENTIONS:  - Assess patency of urinary catheter  - If patient has a chronic diallo, consider changing catheter if non-functioning  - Follow guidelines for intermittent irrigation of non-functioning urinary catheter  Outcome: Progressing     Problem: METABOLIC, FLUID AND ELECTROLYTES - ADULT  Goal: Electrolytes maintained within normal limits  Description: INTERVENTIONS:  - Monitor labs and assess patient for signs and symptoms of electrolyte imbalances  - Administer electrolyte replacement as ordered  - Monitor response to electrolyte replacements, including repeat lab results as appropriate  - Instruct patient on fluid and nutrition as appropriate  Outcome: Progressing  Goal: Glucose maintained within target range  Description: INTERVENTIONS:  - Monitor Blood Glucose as ordered  - Assess for signs and symptoms of hyperglycemia and hypoglycemia  - Administer ordered medications to maintain glucose within target range  - Assess nutritional intake and initiate nutrition service referral as needed  Outcome: Progressing

## 2022-12-04 NOTE — ASSESSMENT & PLAN NOTE
Unclear if Cardiorenal or if LINDEN  Continue to monitor I&O and renal indices  Diurese as tolerated

## 2022-12-04 NOTE — RESPIRATORY THERAPY NOTE
RT Ventilator Management Note  Rita Del Cid 46 y o  male MRN: [id number]  Unit/Bed#:  Encounter: [id number]      Daily Screen    No data found in the last 10 encounters             Physical Exam:   Assessment Type: Pre-treatment  General Appearance: Sleeping  Respiratory Pattern: Assisted  Chest Assessment: Chest expansion symmetrical  Bilateral Breath Sounds: Diminished, Coarse  Cough: None  O2 Device: v60  Subjective Data: sleeping      Resp Comments: transitioned pt to [facility]       12/04/22 1317   Respiratory Assessment   Resp Comments transitioned pt to HFNC   Non-Invasive Information   O2 Interface Device HFNC prongs   Non-Invasive Ventilation Mode HFNC (High flow)   SpO2 94 %   $ Pulse Oximetry Spot Check Charge Completed   Non-Invasive Settings   FiO2 (%) 100   Flow (lpm) 60   Temperature (Set) 31   Non-Invasive Readings   Total Rate 15   Heater Temperature (Obs) 29 6   Maintenance   Water bag changed No

## 2022-12-05 ENCOUNTER — APPOINTMENT (INPATIENT)
Dept: VASCULAR ULTRASOUND | Facility: HOSPITAL | Age: 52
End: 2022-12-05

## 2022-12-05 LAB
2HR DELTA HS TROPONIN: 0 NG/L
4HR DELTA HS TROPONIN: 0 NG/L
ALBUMIN SERPL BCP-MCNC: 3.1 G/DL (ref 3.5–5)
ALP SERPL-CCNC: 45 U/L (ref 46–116)
ALT SERPL W P-5'-P-CCNC: 27 U/L (ref 12–78)
ANION GAP SERPL CALCULATED.3IONS-SCNC: 6 MMOL/L (ref 4–13)
ANION GAP SERPL CALCULATED.3IONS-SCNC: 7 MMOL/L (ref 4–13)
ANION GAP SERPL CALCULATED.3IONS-SCNC: 7 MMOL/L (ref 4–13)
ANION GAP SERPL CALCULATED.3IONS-SCNC: 9 MMOL/L (ref 4–13)
APTT PPP: 26 SECONDS (ref 23–37)
APTT PPP: 32 SECONDS (ref 23–37)
AST SERPL W P-5'-P-CCNC: 25 U/L (ref 5–45)
ATRIAL RATE: 133 BPM
ATRIAL RATE: 137 BPM
BASE EXCESS BLDA CALC-SCNC: 10.4 MMOL/L
BASE EXCESS BLDA CALC-SCNC: 12.3 MMOL/L
BASE EXCESS BLDA CALC-SCNC: 13.6 MMOL/L
BASE EXCESS BLDA CALC-SCNC: 14.1 MMOL/L
BASOPHILS # BLD AUTO: 0.01 THOUSANDS/ÂΜL (ref 0–0.1)
BASOPHILS NFR BLD AUTO: 0 % (ref 0–1)
BILIRUB SERPL-MCNC: 0.7 MG/DL (ref 0.2–1)
BODY TEMPERATURE: 97.3 DEGREES FEHRENHEIT
BODY TEMPERATURE: 98.1 DEGREES FEHRENHEIT
BUN SERPL-MCNC: 39 MG/DL (ref 5–25)
BUN SERPL-MCNC: 41 MG/DL (ref 5–25)
BUN SERPL-MCNC: 43 MG/DL (ref 5–25)
BUN SERPL-MCNC: 44 MG/DL (ref 5–25)
CALCIUM ALBUM COR SERPL-MCNC: 9.8 MG/DL (ref 8.3–10.1)
CALCIUM SERPL-MCNC: 9 MG/DL (ref 8.3–10.1)
CALCIUM SERPL-MCNC: 9.1 MG/DL (ref 8.3–10.1)
CALCIUM SERPL-MCNC: 9.1 MG/DL (ref 8.3–10.1)
CALCIUM SERPL-MCNC: 9.2 MG/DL (ref 8.3–10.1)
CARDIAC TROPONIN I PNL SERPL HS: 15 NG/L
CHLORIDE SERPL-SCNC: 103 MMOL/L (ref 96–108)
CHLORIDE SERPL-SCNC: 104 MMOL/L (ref 96–108)
CHLORIDE SERPL-SCNC: 104 MMOL/L (ref 96–108)
CHLORIDE SERPL-SCNC: 105 MMOL/L (ref 96–108)
CO2 SERPL-SCNC: 34 MMOL/L (ref 21–32)
CO2 SERPL-SCNC: 39 MMOL/L (ref 21–32)
CO2 SERPL-SCNC: 40 MMOL/L (ref 21–32)
CO2 SERPL-SCNC: 40 MMOL/L (ref 21–32)
CREAT SERPL-MCNC: 1.06 MG/DL (ref 0.6–1.3)
CREAT SERPL-MCNC: 1.15 MG/DL (ref 0.6–1.3)
CREAT SERPL-MCNC: 1.18 MG/DL (ref 0.6–1.3)
CREAT SERPL-MCNC: 1.24 MG/DL (ref 0.6–1.3)
EOSINOPHIL # BLD AUTO: 0 THOUSAND/ÂΜL (ref 0–0.61)
EOSINOPHIL NFR BLD AUTO: 0 % (ref 0–6)
ERYTHROCYTE [DISTWIDTH] IN BLOOD BY AUTOMATED COUNT: 15.7 % (ref 11.6–15.1)
ERYTHROCYTE [DISTWIDTH] IN BLOOD BY AUTOMATED COUNT: 15.8 % (ref 11.6–15.1)
GFR SERPL CREATININE-BSD FRML MDRD: 66 ML/MIN/1.73SQ M
GFR SERPL CREATININE-BSD FRML MDRD: 70 ML/MIN/1.73SQ M
GFR SERPL CREATININE-BSD FRML MDRD: 72 ML/MIN/1.73SQ M
GFR SERPL CREATININE-BSD FRML MDRD: 80 ML/MIN/1.73SQ M
GLUCOSE SERPL-MCNC: 143 MG/DL (ref 65–140)
GLUCOSE SERPL-MCNC: 150 MG/DL (ref 65–140)
GLUCOSE SERPL-MCNC: 153 MG/DL (ref 65–140)
GLUCOSE SERPL-MCNC: 174 MG/DL (ref 65–140)
HCO3 BLDA-SCNC: 39.2 MMOL/L (ref 22–28)
HCO3 BLDA-SCNC: 42.4 MMOL/L (ref 22–28)
HCO3 BLDA-SCNC: 43.1 MMOL/L (ref 22–28)
HCO3 BLDA-SCNC: 43.4 MMOL/L (ref 22–28)
HCT VFR BLD AUTO: 48.4 % (ref 36.5–49.3)
HCT VFR BLD AUTO: 49.3 % (ref 36.5–49.3)
HGB BLD-MCNC: 13.5 G/DL (ref 12–17)
HGB BLD-MCNC: 14 G/DL (ref 12–17)
IMM GRANULOCYTES # BLD AUTO: 0.07 THOUSAND/UL (ref 0–0.2)
IMM GRANULOCYTES NFR BLD AUTO: 1 % (ref 0–2)
INR PPP: 1.23 (ref 0.84–1.19)
IPAP: 25
IPAP: 25
LYMPHOCYTES # BLD AUTO: 0.44 THOUSANDS/ÂΜL (ref 0.6–4.47)
LYMPHOCYTES NFR BLD AUTO: 4 % (ref 14–44)
MAGNESIUM SERPL-MCNC: 2.3 MG/DL (ref 1.6–2.6)
MCH RBC QN AUTO: 24.3 PG (ref 26.8–34.3)
MCH RBC QN AUTO: 24.6 PG (ref 26.8–34.3)
MCHC RBC AUTO-ENTMCNC: 27.9 G/DL (ref 31.4–37.4)
MCHC RBC AUTO-ENTMCNC: 28.4 G/DL (ref 31.4–37.4)
MCV RBC AUTO: 87 FL (ref 82–98)
MCV RBC AUTO: 87 FL (ref 82–98)
MONOCYTES # BLD AUTO: 0.36 THOUSAND/ÂΜL (ref 0.17–1.22)
MONOCYTES NFR BLD AUTO: 4 % (ref 4–12)
NEUTROPHILS # BLD AUTO: 9.07 THOUSANDS/ÂΜL (ref 1.85–7.62)
NEUTS SEG NFR BLD AUTO: 91 % (ref 43–75)
NON VENT- BIPAP: ABNORMAL
NON VENT- BIPAP: ABNORMAL
NRBC BLD AUTO-RTO: 0 /100 WBCS
O2 CT BLDA-SCNC: 18.5 ML/DL (ref 16–23)
O2 CT BLDA-SCNC: 18.7 ML/DL (ref 16–23)
O2 CT BLDA-SCNC: 19 ML/DL (ref 16–23)
O2 CT BLDA-SCNC: 19.5 ML/DL (ref 16–23)
OXYHGB MFR BLDA: 90 % (ref 94–97)
OXYHGB MFR BLDA: 91.2 % (ref 94–97)
OXYHGB MFR BLDA: 93.3 % (ref 94–97)
OXYHGB MFR BLDA: 96.3 % (ref 94–97)
P AXIS: 113 DEGREES
PCO2 BLDA: 72.4 MM HG (ref 36–44)
PCO2 BLDA: 75.7 MM HG (ref 36–44)
PCO2 BLDA: 80.1 MM HG (ref 36–44)
PCO2 BLDA: 82.6 MM HG (ref 36–44)
PEEP MAX SETTING VENT: 15 CM[H2O]
PEEP MAX SETTING VENT: 15 CM[H2O]
PH BLDA: 7.33 [PH] (ref 7.35–7.45)
PH BLDA: 7.35 [PH] (ref 7.35–7.45)
PH BLDA: 7.35 [PH] (ref 7.35–7.45)
PH BLDA: 7.37 [PH] (ref 7.35–7.45)
PHOSPHATE SERPL-MCNC: 4.2 MG/DL (ref 2.7–4.5)
PLATELET # BLD AUTO: 206 THOUSANDS/UL (ref 149–390)
PLATELET # BLD AUTO: 219 THOUSANDS/UL (ref 149–390)
PMV BLD AUTO: 9.7 FL (ref 8.9–12.7)
PMV BLD AUTO: 9.8 FL (ref 8.9–12.7)
PO2 BLDA: 103.6 MM HG (ref 75–129)
PO2 BLDA: 64.8 MM HG (ref 75–129)
PO2 BLDA: 69.4 MM HG (ref 75–129)
PO2 BLDA: 73.2 MM HG (ref 75–129)
POTASSIUM SERPL-SCNC: 4 MMOL/L (ref 3.5–5.3)
POTASSIUM SERPL-SCNC: 4.2 MMOL/L (ref 3.5–5.3)
POTASSIUM SERPL-SCNC: 4.2 MMOL/L (ref 3.5–5.3)
POTASSIUM SERPL-SCNC: 4.3 MMOL/L (ref 3.5–5.3)
PR INTERVAL: 112 MS
PROT SERPL-MCNC: 6.4 G/DL (ref 6.4–8.4)
PROTHROMBIN TIME: 15.3 SECONDS (ref 11.6–14.5)
QRS AXIS: 104 DEGREES
QRS AXIS: 111 DEGREES
QRSD INTERVAL: 156 MS
QRSD INTERVAL: 162 MS
QT INTERVAL: 354 MS
QT INTERVAL: 378 MS
QTC INTERVAL: 527 MS
QTC INTERVAL: 534 MS
RBC # BLD AUTO: 5.55 MILLION/UL (ref 3.88–5.62)
RBC # BLD AUTO: 5.68 MILLION/UL (ref 3.88–5.62)
SODIUM SERPL-SCNC: 148 MMOL/L (ref 135–147)
SODIUM SERPL-SCNC: 150 MMOL/L (ref 135–147)
SPECIMEN SOURCE: ABNORMAL
T WAVE AXIS: -73 DEGREES
T WAVE AXIS: 56 DEGREES
VENT BIPAP FIO2: 80 %
VENT BIPAP FIO2: 90 %
VENTRICULAR RATE: 117 BPM
VENTRICULAR RATE: 137 BPM
WBC # BLD AUTO: 10.41 THOUSAND/UL (ref 4.31–10.16)
WBC # BLD AUTO: 9.95 THOUSAND/UL (ref 4.31–10.16)

## 2022-12-05 RX ORDER — METOPROLOL TARTRATE 5 MG/5ML
5 INJECTION INTRAVENOUS ONCE
Status: COMPLETED | OUTPATIENT
Start: 2022-12-05 | End: 2022-12-05

## 2022-12-05 RX ORDER — MAGNESIUM SULFATE HEPTAHYDRATE 40 MG/ML
2 INJECTION, SOLUTION INTRAVENOUS ONCE
Status: COMPLETED | OUTPATIENT
Start: 2022-12-05 | End: 2022-12-06

## 2022-12-05 RX ORDER — HEPARIN SODIUM 1000 [USP'U]/ML
4000 INJECTION, SOLUTION INTRAVENOUS; SUBCUTANEOUS ONCE
Status: COMPLETED | OUTPATIENT
Start: 2022-12-05 | End: 2022-12-05

## 2022-12-05 RX ORDER — HEPARIN SODIUM 10000 [USP'U]/100ML
3-30 INJECTION, SOLUTION INTRAVENOUS
Status: DISCONTINUED | OUTPATIENT
Start: 2022-12-05 | End: 2022-12-12

## 2022-12-05 RX ORDER — HEPARIN SODIUM 1000 [USP'U]/ML
2000 INJECTION, SOLUTION INTRAVENOUS; SUBCUTANEOUS
Status: DISCONTINUED | OUTPATIENT
Start: 2022-12-05 | End: 2022-12-12

## 2022-12-05 RX ORDER — HEPARIN SODIUM 1000 [USP'U]/ML
4000 INJECTION, SOLUTION INTRAVENOUS; SUBCUTANEOUS
Status: DISCONTINUED | OUTPATIENT
Start: 2022-12-05 | End: 2022-12-12

## 2022-12-05 RX ADMIN — EPOPROSTENOL 49.8 NG/KG/MIN: 1.5 INJECTION, POWDER, LYOPHILIZED, FOR SOLUTION INTRAVENOUS at 07:59

## 2022-12-05 RX ADMIN — HEPARIN SODIUM 4000 UNITS: 1000 INJECTION INTRAVENOUS; SUBCUTANEOUS at 19:16

## 2022-12-05 RX ADMIN — MAGNESIUM SULFATE HEPTAHYDRATE 2 G: 40 INJECTION, SOLUTION INTRAVENOUS at 11:45

## 2022-12-05 RX ADMIN — SILDENAFIL 20 MG: 20 TABLET ORAL at 08:13

## 2022-12-05 RX ADMIN — ISODIUM CHLORIDE 3 ML: 0.03 SOLUTION RESPIRATORY (INHALATION) at 07:55

## 2022-12-05 RX ADMIN — EPOPROSTENOL 49.8 NG/KG/MIN: 1.5 INJECTION, POWDER, LYOPHILIZED, FOR SOLUTION INTRAVENOUS at 14:02

## 2022-12-05 RX ADMIN — ISODIUM CHLORIDE 3 ML: 0.03 SOLUTION RESPIRATORY (INHALATION) at 14:02

## 2022-12-05 RX ADMIN — SILDENAFIL 20 MG: 20 TABLET ORAL at 21:01

## 2022-12-05 RX ADMIN — Medication 12.5 MG: at 21:00

## 2022-12-05 RX ADMIN — DEXAMETHASONE SODIUM PHOSPHATE 18.4 MG: 10 INJECTION, SOLUTION INTRAMUSCULAR; INTRAVENOUS at 19:09

## 2022-12-05 RX ADMIN — LEVALBUTEROL HYDROCHLORIDE 1.25 MG: 1.25 SOLUTION, CONCENTRATE RESPIRATORY (INHALATION) at 14:02

## 2022-12-05 RX ADMIN — DEXAMETHASONE SODIUM PHOSPHATE 18.4 MG: 10 INJECTION, SOLUTION INTRAMUSCULAR; INTRAVENOUS at 08:20

## 2022-12-05 RX ADMIN — ISODIUM CHLORIDE 3 ML: 0.03 SOLUTION RESPIRATORY (INHALATION) at 19:43

## 2022-12-05 RX ADMIN — FUROSEMIDE 40 MG: 10 INJECTION, SOLUTION INTRAMUSCULAR; INTRAVENOUS at 08:13

## 2022-12-05 RX ADMIN — REMDESIVIR 100 MG: 100 INJECTION, POWDER, LYOPHILIZED, FOR SOLUTION INTRAVENOUS at 00:48

## 2022-12-05 RX ADMIN — Medication 12.5 MG: at 11:51

## 2022-12-05 RX ADMIN — ENOXAPARIN SODIUM 60 MG: 60 INJECTION SUBCUTANEOUS at 08:13

## 2022-12-05 RX ADMIN — AMIODARONE HYDROCHLORIDE 150 MG: 50 INJECTION, SOLUTION INTRAVENOUS at 12:48

## 2022-12-05 RX ADMIN — EPOPROSTENOL 49.8 NG/KG/MIN: 1.5 INJECTION, POWDER, LYOPHILIZED, FOR SOLUTION INTRAVENOUS at 02:42

## 2022-12-05 RX ADMIN — METOROPROLOL TARTRATE 10 MG: 5 INJECTION, SOLUTION INTRAVENOUS at 04:36

## 2022-12-05 RX ADMIN — EPOPROSTENOL 49.8 NG/KG/MIN: 1.5 INJECTION, POWDER, LYOPHILIZED, FOR SOLUTION INTRAVENOUS at 21:33

## 2022-12-05 RX ADMIN — HEPARIN SODIUM 11.1 UNITS/KG/HR: 10000 INJECTION, SOLUTION INTRAVENOUS at 11:38

## 2022-12-05 RX ADMIN — ATORVASTATIN CALCIUM 10 MG: 10 TABLET, FILM COATED ORAL at 15:30

## 2022-12-05 RX ADMIN — LEVALBUTEROL HYDROCHLORIDE 1.25 MG: 1.25 SOLUTION, CONCENTRATE RESPIRATORY (INHALATION) at 19:43

## 2022-12-05 RX ADMIN — LEVALBUTEROL HYDROCHLORIDE 1.25 MG: 1.25 SOLUTION, CONCENTRATE RESPIRATORY (INHALATION) at 07:55

## 2022-12-05 RX ADMIN — SILDENAFIL 20 MG: 20 TABLET ORAL at 15:24

## 2022-12-05 RX ADMIN — HEPARIN SODIUM 4000 UNITS: 1000 INJECTION INTRAVENOUS; SUBCUTANEOUS at 11:44

## 2022-12-05 RX ADMIN — METOROPROLOL TARTRATE 5 MG: 5 INJECTION, SOLUTION INTRAVENOUS at 15:25

## 2022-12-05 NOTE — PROGRESS NOTES
Progress Note - Pulmonary   Rock Deep 46 y o  male MRN: [id number]  Unit/Bed#:  Encounter: [id number]    Assessment:  1  Acute on chronic hypoxemic and hypercapnic respiratory failure  2  Obesity hypoventilation  3  Severe pulmonary hypertension and right heart failure  4  COVID 19 infection  5  Morbid obesity    Plan:  Acute on chronic hypoxemic hypercapnic respiratory failure in patient with likely obesity hypoventilation, severe pulmonary hypertension, right heart failure, recent COVID-19 infection  Currently on HFNC with sats in the low 90s  CT scan on admission shows cardiomegaly with small right effusion, scattered areas of atelectasis, no pneumonia, no significant ground-glass opacities related to [address] does not seem to be component in his respiratory failure  Recent PFTs showed restrictive airflow, moderately decreased lung volumes and DLCO, small airway obstructive airflow limitation  Continue COVID pathway, critical care managing  Would recommend nebulizer treatments if able given small airway disease on his recent PFTs  Continue BiPAP q h s , he recently had a sleep study done  I spoke with his outpatient pulmonary office and they did order him a device through [address]  Patient has not yet received it  He was started on sildenafil as an outpatient, currently on epoprostenol as well  No documentation of any right heart catheterization, would recommend right heart catheterization for evaluation of his pulmonary hypertension  Continue aggressive diuresis per critical care  Will follow intermittently, please call with questions  Subjective:   Patient lying in bed  Reports some improvement in his shortness of breath  Objective:     Vitals: Blood pressure 142/95, pulse 95, temperature 98 1 °F (36 7 °C), temperature source Bladder, resp  rate 16, height 5' 11" (1 803 m), weight (!) 178 kg (391 lb 8 6 oz), SpO2 93 %  ,Body mass index is 54 61 kg/m²        Intake/Output Summary (Last 24 hours) at 12/5/2022 1141  Last data filed at 12/5/2022 1101  Gross per 24 hour   Intake 270 ml   Output 4705 ml   Net -4435 ml       Invasive Devices     Peripheral Intravenous Line  Duration           Peripheral IV Distal;Left;Upper Forearm -- days    Peripheral IV 12/03/22 Left;Ventral (anterior) Forearm 2 days    Peripheral IV 12/03/22 Right Antecubital 2 days          Arterial Line  Duration           Arterial Line 12/03/22 Radial 1 day          Drain  Duration           Urethral Catheter 2 days                Physical Exam: /95 (BP Location: Left arm)   Pulse 95   Temp 98 1 °F (36 7 °C) (Bladder)   Resp 16   Ht 5' 11" (1 803 m)   Wt (!) 178 kg (391 lb 8 6 oz)   SpO2 93%   BMI 54 61 kg/m²   General appearance: alert and oriented, in no acute distress and morbidly obese  Head: Normocephalic, without obvious abnormality, atraumatic  Eyes: negative findings: conjunctivae and sclerae normal  Lungs: diminished breath sounds  Heart: regular rate and rhythm  Abdomen: normal findings: soft, non-tender  Extremities: bilateral LE edema  Skin: Warm and dry  Neurologic: Mental status: Alert, oriented, thought content appropriate     Labs: I have personally reviewed pertinent lab results  , CBC:   Lab Results   Component Value Date    WBC 10 41 (H) 12/05/2022    HGB 14 0 12/05/2022    HCT 49 3 12/05/2022    MCV 87 12/05/2022     12/05/2022    MCH 24 6 (L) 12/05/2022    MCHC 28 4 (L) 12/05/2022    RDW 15 7 (H) 12/05/2022    MPV 9 8 12/05/2022    NRBC 0 12/05/2022   , CMP:   Lab Results   Component Value Date    SODIUM 148 (H) 12/05/2022    K 4 3 12/05/2022     12/05/2022    CO2 34 (H) 12/05/2022    BUN 43 (H) 12/05/2022    CREATININE 1 18 12/05/2022    CALCIUM 9 1 12/05/2022    AST 25 12/05/2022    ALT 27 12/05/2022    ALKPHOS 45 (L) 12/05/2022    EGFR 70 12/05/2022     Imaging and other studies: I have personally reviewed pertinent reports     and I have personally reviewed pertinent films in PACS

## 2022-12-05 NOTE — PROGRESS NOTES
[address] [place] [address]  Progress Note Del Gomez 1970, 46 y o  male MRN: [id number]  Unit/Bed#:  Encounter: [id number]  Primary Care Provider: No primary care provider on file  Date and time admitted to hospital: 12/1/2022  5:52 PM    Elevated serum creatinine  Assessment & Plan  Unclear if Cardiorenal or if LINDEN  Continue to monitor I&O and renal indices  Diurese as tolerated    Pulmonary hypertension (HCC)  Assessment & Plan  Continue sildenafil and epoprostenol    Obstructive sleep apnea  Assessment & Plan  BiPAP at [facility]  Patient 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  Would benefit from sleep study as outpatient  Morbid obesity [facility])  Assessment & Plan  Nutrition consulted  Encourage lifestyle modifications    Mixed hyperlipidemia  Assessment & Plan  Continue home statin    CHF (congestive heart failure) ([facility])  Assessment & Plan  Wt Readings from Last 3 Encounters:   12/04/22 (!) 178 kg (391 lb 8 6 oz)     Continue Lasix b i d  Daily weights  Continue epoprostenol to offload right heart, restarted sildenafil      * Acute respiratory failure due to COVID-19 [facility])  Assessment & Plan  Weaned to high-flow nasal cannula during the day, continues to use BiPAP at HS  Continue remdesivir and baricitinib, tocilizumab held due to administration of baricitinib  Continue Decadron to 0 1 milligrams/kg b i d  Bronchodilators as needed  Prone as tolerated          ----------------------------------------------------------------------------------------  HPI/24hr events:  Weaned high-flow nasal cannula during the day yesterday, utilizing BiPAP at HS  Hemodynamically stable      Patient appropriate for transfer out of the ICU today?: No  Disposition: Continue Critical Care   Code Status: Level 1 - Full Code  ---------------------------------------------------------------------------------------  SUBJECTIVE  No complaints offered    Review of Systems  Review of systems was reviewed and negative unless stated above in HPI/24-hour events   ---------------------------------------------------------------------------------------  OBJECTIVE    Vitals   Vitals:    22 0000 22 0122   BP:       BP Location:       Pulse:  (!) 111     Resp:  18     Temp:   98 4 °F (36 9 °C)    TempSrc:       SpO2: 98% 96% 95% 95%   Weight:       Height:         Temp (24hrs), Av 4 °F (36 9 °C), Min:97 9 °F (36 6 °C), Max:99 °F (37 2 °C)  Current: Temperature: 98 4 °F (36 9 °C)  Arterial Line BP: 135/68  Arterial Line MAP (mmHg): 89 mmHg    Respiratory:  SpO2: SpO2: 95 %  Nasal Cannula O2 Flow Rate (L/min): (S) 15 L/min    Invasive/non-invasive ventilation settings   Respiratory    Lab Data (Last 4 hours)       0057            pH, Arterial       7 373             pCO2, Arterial       75 7             pO2, Arterial       73 2             HCO3, Arterial       43 1             Base Excess, Arterial       14 1                  O2/Vent Data       2258   Most Recent        Non-Invasive Ventilation Mode BiPAP  BiPAP                  Physical Exam  GEN:  Morbidly obese  HEENT:  Sclera anicteric, mucous membranes pink and moist, conjunctiva pink, no marguerite/rhinorrhea  CV :  S1S2, regular, no murmurs, rubs or gallops  Intact distal pulses  Resp:  Lungs diminished throughout  No subq air or crepitus  Symmetrical expansion  No cough noted    GI :  Abd soft, nontender, no guarding/rebound, nondistended, hypoactive bowel sounds X4 quads, no organomegaly appreciated  Neuro:  CN II-XII grossly intact, nonfocal exam, speech clear, GCS 15        Laboratory and Diagnostics:  Results from last 7 days   Lab Units 22  0556 22  0458 22  0218 22  0211 22  2155 22  0448 22  1822   WBC Thousand/uL 9 53 10 38*  --   --   --  10 64* 9 50   HEMOGLOBIN g/dL 13 8 13 6  --   --   --  14 4 15 2   I STAT HEMOGLOBIN g/dl  --   --  15 6 15 6 15 3  --   --    HEMATOCRIT % 49 1 49 3  --   --   --  51 5* 52 3*   HEMATOCRIT, ISTAT %  --   --  46 46 45  --   --    PLATELETS Thousands/uL 229 232  --   --   --  232 269   NEUTROS PCT % 89* 90*  --   --   --   --  65   MONOS PCT % 4 2*  --   --   --   --  12     Results from last 7 days   Lab Units 12/05/22  0057 12/04/22  1733 12/04/22  1158 12/04/22  0556 12/04/22  0110 12/03/22  1814 12/03/22  1054 12/03/22  0458 12/02/22  0448 12/01/22  1822   SODIUM mmol/L 150* 150* 149* 149* 146 145 146 147   < > 145   POTASSIUM mmol/L 4 2 4 5 4 6 4 7 4 8 4 8 5 0 5 5*   < > 4 5   CHLORIDE mmol/L 104 105 104 103 103 102 104 103   < > 105   CO2 mmol/L 40* 39* 41* 40* 38* 37* 36* 37*   < > 33*   CO2, I-STAT   --   --   --   --   --   --   --   --    < >  --    ANION GAP mmol/L 6 6 4 6 5 6 6 7   < > 7   BUN mg/dL 44* 42* 43* 39* 40* 36* 33* 32*   < > 37*   CREATININE mg/dL 1 24 1 25 1 29 1 32* 1 36* 1 37* 1 34* 1 31*   < > 1 39*   CALCIUM mg/dL 9 0 9 1 9 0 9 1 8 8 8 9 8 6 8 4   < > 8 7   GLUCOSE RANDOM mg/dL 143* 154* 167* 164* 150* 156* 154* 146*   < > 113   ALT U/L  --   --   --  32  --   --   --  28  --  36   AST U/L  --   --   --  36  --   --   --  22  --  24   ALK PHOS U/L  --   --   --  49  --   --   --  54  --  68   ALBUMIN g/dL  --   --   --  3 2*  --   --   --  3 0*  --  3 2*   TOTAL BILIRUBIN mg/dL  --   --   --  0 52  --   --   --  0 50  --  0 57    < > = values in this interval not displayed       Results from last 7 days   Lab Units 12/04/22  0556 12/03/22  0458 12/01/22  1822   MAGNESIUM mg/dL 2 2 2 0 2 2   PHOSPHORUS mg/dL 4 6* 4 2  --       Results from last 7 days   Lab Units 12/03/22  0458 12/03/22  0049 12/02/22  2301 12/02/22  1256 12/02/22  0532 12/01/22  2238   INR   --   --   --   --   --  1 18   PTT seconds 62* 69* 36 32 28 22*              ABG:  Results from last 7 days   Lab Units 12/05/22  0057   PH ART  7 373   PCO2 ART mm Hg 75 7*   PO2 ART mm Hg 73 2*   HCO3 ART mmol/L 43 1*   BASE EXC ART mmol/L 14 1   ABG SOURCE  Line, Arterial     VBG:  Results from last 7 days   Lab Units 12/05/22  0057 12/03/22  1435 12/03/22  1054   PH HERNESTO   --   --  7 250*   PCO2 HERNESTO mm Hg  --   --  98 1*   PO2 HERNESTO mm Hg  --   --  63 7*   HCO3 HERNESTO mmol/L  --   --  42 1*   BASE EXC HERNESTO mmol/L  --   --  10 2   ABG SOURCE  Line, Arterial   < >  --     < > = values in this interval not displayed  Results from last 7 days   Lab Units 12/03/22  0458 12/02/22  1256   PROCALCITONIN ng/ml 0 07 0 11       Micro        EKG:  Sinus rhythm with PACs  Imaging: I have personally reviewed pertinent reports  and I have personally reviewed pertinent films in PACS    Intake and Output  I/O       12/03 0701 12/04 0700 12/04 0701 12/05 0700    I V  (mL/kg) 388 2 (2 1) 270 (1 5)    IV Piggyback 250     Total Intake(mL/kg) 638 2 (3 5) 270 (1 5)    Urine (mL/kg/hr) 5580 (1 3) 3750 (0 9)    Total Output 5580 3750    Net -[facility] 9 [phone]                Height and Weights   Height: 5' 11" (180 3 cm)  IBW (Ideal Body Weight): 75 3 kg  Body mass index is 54 61 kg/m²  Weight (last 2 days)     Date/Time Weight    12/04/22 0900 178 (391 54)            Nutrition       Diet Orders   (From admission, onward)             Start     Ordered    12/04/22 2116  Diet NPO; Sips with meds  Diet effective now        References:    Nutrtion Support Algorithm Enteral vs  Parenteral   Question Answer Comment   Diet Type NPO    NPO Except: Sips with meds    RD to adjust diet per protocol?  No        12/04/22 2116                  Active Medications  Scheduled Meds:  Current Facility-Administered Medications   Medication Dose Route Frequency Provider Last Rate   • acetaminophen  650 mg Oral Q6H PRN ELIZABETH Ward     • albuterol  2 puff Inhalation Q4H PRN ELIZABETH Ward     • atorvastatin  10 mg Oral Daily [facility]     • baricitinib  2 mg Oral Q24H [address], ELIZABETH     • dexamethasone  0 1 mg/kg Intravenous Q12H ELIZABETH Wolf 18 4 mg (12/04/22 2030)   • dextromethorphan-guaiFENesin  10 mL Oral Q4H PRN ELIZABETH Wolf     • enoxaparin  60 mg Subcutaneous Q12H [address] EvELIZABETH Kiran     • epoprostenol  6 25-50 ng/kg/min (Ideal) Inhalation Titrated Infantejose Dias DO 49 801 ng/kg/min (12/05/22 0122)   • furosemide  40 mg Intravenous BID (diuretic) ELIZABETH Wolf     • levalbuterol  1 25 mg Nebulization TID ELIZABETH Helm     • metoprolol  10 mg Intravenous Q6H Trevor Tesranjit DO     • remdesivir  100 mg Intravenous Q24H ELIZABETH Wolf     • sildenafil  20 mg Oral TID ELIZABETH Byrd     • sodium chloride  3 mL Nebulization TID ELIZABETH Helm       Continuous Infusions:  epoprostenol, 6 25-50 ng/kg/min (Ideal), Last Rate: 49 801 ng/kg/min (12/05/22 0122)      PRN Meds:   acetaminophen, 650 mg, Q6H PRN  albuterol, 2 puff, Q4H PRN  dextromethorphan-guaiFENesin, 10 mL, Q4H PRN        Invasive Devices Review  Invasive Devices     Peripheral Intravenous Line  Duration           Peripheral IV Distal;Left;Upper Forearm -- days    Peripheral IV 12/03/22 Right Antecubital 2 days    Peripheral IV 12/03/22 Left;Ventral (anterior) Forearm 1 day          Arterial Line  Duration           Arterial Line 12/03/22 Radial 1 day          Drain  Duration           Urethral Catheter 2 days                Rationale for remaining devices:  Frequent ABGs  ---------------------------------------------------------------------------------------  Advance Directive and Living Will:      Power of :    POLST:    ---------------------------------------------------------------------------------------  Care Time Delivered:   No Critical Care time spent       [facility]      Portions of the record may have been created with voice recognition software    Occasional wrong word or "sound a like" substitutions may have occurred due to the inherent limitations of voice recognition software    Read the chart carefully and recognize, using context, where substitutions have occurred

## 2022-12-05 NOTE — RESPIRATORY THERAPY NOTE
Received patient on HFNC 60L 80%, titrated O2 to 65% with spo2 93%  Continuing to get maximum dose of Veletri  Per PA, wean only the O2 and have nights wean Veletri

## 2022-12-05 NOTE — PLAN OF CARE
Problem: Potential for Falls  Goal: Patient will remain free of falls  Description: INTERVENTIONS:  - Educate patient/family on patient safety including physical limitations  - Instruct patient to call for assistance with activity   - Consult OT/PT to assist with strengthening/mobility   - Keep Call bell within reach  - Keep bed low and locked with side rails adjusted as appropriate  - Keep care items and personal belongings within reach  - Initiate and maintain comfort rounds  - Make Fall Risk Sign visible to staff  - Initiate/Maintain bed alarm  - Obtain necessary fall risk management equipment  - Apply yellow socks and bracelet for high fall risk patients  - Consider moving patient to room near nurses station  Outcome: Progressing     Problem: Nutrition/Hydration-ADULT  Goal: Nutrient/Hydration intake appropriate for improving, restoring or maintaining nutritional needs  Description: Monitor and assess patient's nutrition/hydration status for malnutrition  Collaborate with interdisciplinary team and initiate plan and interventions as ordered  Monitor patient's weight and dietary intake as ordered or per policy  Utilize nutrition screening tool and intervene as necessary  Determine patient's food preferences and provide high-protein, high-caloric foods as appropriate       INTERVENTIONS:  - Monitor oral intake, urinary output, labs, and treatment plans  - Assess nutrition and hydration status and recommend course of action  - Evaluate amount of meals eaten  - Assist patient with eating if necessary   - Allow adequate time for meals  - Recommend/ encourage appropriate diets, oral nutritional supplements, and vitamin/mineral supplements  - Order, calculate, and assess calorie counts as needed  - Recommend, monitor, and adjust tube feedings and TPN/PPN based on assessed needs  - Assess need for intravenous fluids  - Provide specific nutrition/hydration education as appropriate  - Include patient/family/caregiver in decisions related to nutrition  Outcome: Progressing     Problem: MOBILITY - ADULT  Goal: Maintain or return to baseline ADL function  Description: INTERVENTIONS:  -  Assess patient's ability to carry out ADLs; assess patient's baseline for ADL function and identify physical deficits which impact ability to perform ADLs (bathing, care of mouth/teeth, toileting, grooming, dressing, etc )  - Assess/evaluate cause of self-care deficits   - Assess range of motion  - Assess patient's mobility; develop plan if impaired  - Assess patient's need for assistive devices and provide as appropriate  - Encourage maximum independence but intervene and supervise when necessary  - Involve family in performance of ADLs  - Assess for home care needs following discharge   - Consider OT consult to assist with ADL evaluation and planning for discharge  - Provide patient education as appropriate  Outcome: Progressing  Goal: Maintains/Returns to pre admission functional level  Description: INTERVENTIONS:  - Perform BMAT or MOVE assessment daily    - Set and communicate daily mobility goal to care team and patient/family/caregiver     - Collaborate with rehabilitation services on mobility goals if consulted  - Out of bed for toileting  - Record patient progress and toleration of activity level   Outcome: Progressing     Problem: Prexisting or High Potential for Compromised Skin Integrity  Goal: Skin integrity is maintained or improved  Description: INTERVENTIONS:  - Identify patients at risk for skin breakdown  - Assess and monitor skin integrity  - Assess and monitor nutrition and hydration status  - Monitor labs   - Assess for incontinence   - Turn and reposition patient  - Assist with mobility/ambulation  - Relieve pressure over bony prominences  - Avoid friction and shearing  - Provide appropriate hygiene as needed including keeping skin clean and dry  - Evaluate need for skin moisturizer/barrier cream  - Collaborate with interdisciplinary team   - Patient/family teaching  - Consider wound care consult   Outcome: Progressing     Problem: CARDIOVASCULAR - ADULT  Goal: Maintains optimal cardiac output and hemodynamic stability  Description: INTERVENTIONS:  - Monitor I/O, vital signs and rhythm  - Monitor for S/S and trends of decreased cardiac output  - Administer and titrate ordered vasoactive medications to optimize hemodynamic stability  - Assess quality of pulses, skin color and temperature  - Assess for signs of decreased coronary artery perfusion  - Instruct patient to report change in severity of symptoms  Outcome: Progressing  Goal: Absence of cardiac dysrhythmias or at baseline rhythm  Description: INTERVENTIONS:  - Continuous cardiac monitoring, vital signs, obtain 12 lead EKG if ordered  - Administer antiarrhythmic and heart rate control medications as ordered  - Monitor electrolytes and administer replacement therapy as ordered  Outcome: Progressing     Problem: RESPIRATORY - ADULT  Goal: Achieves optimal ventilation and oxygenation  Description: INTERVENTIONS:  - Assess for changes in respiratory status  - Assess for changes in mentation and behavior  - Position to facilitate oxygenation and minimize respiratory effort  - Oxygen administered by appropriate delivery if ordered  - Initiate smoking cessation education as indicated  - Encourage broncho-pulmonary hygiene including cough, deep breathe, Incentive Spirometry  - Assess the need for suctioning and aspirate as needed  - Assess and instruct to report SOB or any respiratory difficulty  - Respiratory Therapy support as indicated  Outcome: Progressing     Problem: GENITOURINARY - ADULT  Goal: Urinary catheter remains patent  Description: INTERVENTIONS:  - Assess patency of urinary catheter  - If patient has a chronic diallo, consider changing catheter if non-functioning  - Follow guidelines for intermittent irrigation of non-functioning urinary catheter  Outcome: Progressing     Problem: METABOLIC, FLUID AND ELECTROLYTES - ADULT  Goal: Electrolytes maintained within normal limits  Description: INTERVENTIONS:  - Monitor labs and assess patient for signs and symptoms of electrolyte imbalances  - Administer electrolyte replacement as ordered  - Monitor response to electrolyte replacements, including repeat lab results as appropriate  - Instruct patient on fluid and nutrition as appropriate  Outcome: Progressing  Goal: Glucose maintained within target range  Description: INTERVENTIONS:  - Monitor Blood Glucose as ordered  - Assess for signs and symptoms of hyperglycemia and hypoglycemia  - Administer ordered medications to maintain glucose within target range  - Assess nutritional intake and initiate nutrition service referral as needed  Outcome: Progressing

## 2022-12-05 NOTE — ASSESSMENT & PLAN NOTE
Wt Readings from Last 3 Encounters:   12/04/22 (!) 178 kg (391 lb 8 6 oz)     Continue Lasix b i d    Daily weights  Continue epoprostenol to offload right heart, restarted sildenafil

## 2022-12-05 NOTE — CASE MANAGEMENT
Case Management Assessment & Discharge Planning Note    Patient name Lauren Kenton  Location / MRN [id number]  : 1970 Date 2022       Current Admission Date: 2022  Current Admission Diagnosis:Acute respiratory failure due to COVID-19 [facility])   Patient Active Problem List    Diagnosis Date Noted   • CHF (congestive heart failure) ([place] [address] ) 2022   • Acute respiratory failure due to COVID-19 ([place] [address] ) 2022   • Elevated serum creatinine 2022   • COPD mixed type ([place] [address] ) 2022   • Obstructive sleep apnea 2021   • Left ventricular hypertrophy 2021   • Pulmonary hypertension ([place] [address] ) 2021   • Impaired fasting glucose 2020   • Hypertension, essential 2017   • Mixed hyperlipidemia 2017   • Noncompliance with CPAP treatment 03/10/2014   • Morbid obesity ([place] [address] ) 2013      LOS (days): 4  Geometric Mean LOS (GMLOS) (days): 5 20  Days to GMLOS:1 5     OBJECTIVE:    Risk of Unplanned Readmission Score: 14 78         Current admission status: Inpatient       Preferred Pharmacy:   PATIENT/FAMILY REPORTS NO PREFERRED PHARMACY  No address on file      Primary Care Provider: No primary care provider on file  Primary Insurance: BLUE CROSS  Secondary Insurance:     ASSESSMENT:  Active Health Care Proxies    There are no active Health Care Proxies on file  Readmission Root Cause  30 Day Readmission: No    Patient Information  Admitted from[de-identified] Home  Mental Status: Alert  During Assessment patient was accompanied by: Not accompanied during assessment  Assessment information provided by[de-identified] Spouse  Primary Caregiver: Self  Support Systems: Self  County of Residence: [place] [address] do you live in?: [address] entry access options   Select all that apply : No steps to enter home  Type of Current Residence: 2 story home  Upon entering residence, is there a bedroom on the main floor (no further steps)?: No  A bedroom is located on the following floor levels of residence (select all that apply):: 2nd Floor  Upon entering residence, is there a bathroom on the main floor (no further steps)?: No  Indicate which floors of current residence have a bathroom (select all the apply):: 2nd Floor  Number of steps to 2nd floor from main floor: One Flight  In the last 12 months, was there a time when you were not able to pay the mortgage or rent on time?: No  In the last 12 months, was there a time when you did not have a steady place to sleep or slept in a shelter (including now)?: No  Homeless/housing insecurity resource given?: N/A  Living Arrangements: Lives w/ Spouse/significant other  Is patient a ?: No    Activities of Daily Living Prior to Admission  Functional Status: Independent  Completes ADLs independently?: Yes  Ambulates independently?: Yes  Does patient use assisted devices?: No  Does patient currently own DME?: Yes  What DME does the patient currently own?: Home Oxygen concentrator (Home O2 through [address] )  Does patient have a history of Outpatient Therapy (PT/OT)?: No  Does the patient have a history of Short-Term Rehab?: No  Does patient have a history of HHC?: No  Does patient currently have [facility]?: No         Patient Information Continued  Income Source: Employed  Does patient have prescription coverage?: Yes  Within the past 12 months, you worried that your food would run out before you got the money to buy more : Never true  Within the past 12 months, the food you bought just didn't last and you didn't have money to get more : Never true  Food insecurity resource given?: N/A  Does patient receive dialysis treatments?: No  Does patient have a history of substance abuse?: No  Does patient have a history of Mental Health Diagnosis?: No         Means of Transportation  Means of Transport to Appts[de-identified] Drives Self  In the past 12 months, has lack of transportation kept you from medical appointments or from getting medications?: No  In the past 12 months, has lack of transportation kept you from meetings, work, or from getting things needed for daily living?: No  Was application for public transport provided?: N/A        DISCHARGE DETAILS:    Discharge planning discussed with[de-identified] Pt's wife via phone call  Freedom of Choice: Yes  Comments - Freedom of Choice: No referrals made at this time  CM contacted family/caregiver?: Yes  Were Treatment Team discharge recommendations reviewed with patient/caregiver?: Yes  Did patient/caregiver verbalize understanding of patient care needs?: Yes  Were patient/caregiver advised of the risks associated with not following Treatment Team discharge recommendations?: Yes    Contacts  Patient Contacts: Carter Meza (Wife)  Relationship to Patient[de-identified] Family  Contact Method: Phone  Phone Number: [phone]  Reason/Outcome: Discharge Planning, Emergency [address]         Is the patient interested in [place] [address] at discharge?: No    DME Referral Provided  Referral made for DME?: No    Other Referral/Resources/Interventions Provided:  Referral Comments: No referrals made at this time

## 2022-12-05 NOTE — ASSESSMENT & PLAN NOTE
Weaned to high-flow nasal cannula during the day, continues to use BiPAP at HS  Continue remdesivir and baricitinib, tocilizumab held due to administration of baricitinib  Continue Decadron to 0 1 milligrams/kg b i d    Bronchodilators as needed  Prone as tolerated

## 2022-12-05 NOTE — RESPIRATORY THERAPY NOTE
RT Protocol Note  Josephine Castellanos 46 y o  male MRN: [id number]  Unit/Bed#:  Encounter: [id number]    Assessment    Principal Problem:    Acute respiratory failure due to COVID-19 [facility])  Active Problems:    CHF (congestive heart failure) ([facility] )    Mixed hyperlipidemia    Morbid obesity ([facility] )    Obstructive sleep apnea    Pulmonary hypertension ([facility] )    Elevated serum creatinine      Home Pulmonary Medications:     12/05/22 0759   Respiratory Protocol   Protocol Initiated? No   Protocol Selection Respiratory   Language Barrier? No   Medical & Social History Reviewed? Yes   Diagnostic Studies Reviewed? Yes   Physical Assessment Performed? Yes   Home Devices/Therapy BiPAP/CPAP   Respiratory Plan Mild Distress pathway   Respiratory Assessment   Assessment Type Assess only   General Appearance Alert; Awake   Respiratory Pattern Normal   Chest Assessment Chest expansion symmetrical   Bilateral Breath Sounds Diminished   Resp Comments remains on hfnc, new bag of veletri started   O2 Device hfnc   Additional Assessments   SpO2 93 %     Home Devices/Therapy: BiPAP/CPAP    Past Medical History:   Diagnosis Date    CHF (congestive heart failure) (HCC)     Hypertension     Sleep apnea      Social History     Socioeconomic History    Marital status: /Civil Union     Spouse name: None    Number of children: None    Years of education: None    Highest education level: None   Occupational History    None   Tobacco Use    Smoking status: Former     Types: Cigarettes    Smokeless tobacco: Never   Substance and Sexual Activity    Alcohol use: Not Currently    Drug use: Not Currently    Sexual activity: None   Other Topics Concern    None   Social History Narrative    None     Social Determinants of Health     Financial Resource Strain: Not on file   Food Insecurity: Not on file   Transportation Needs: Not on file   Physical Activity: Not on file   Stress: Not on file   Social Connections: Not on file   Intimate Partner Violence: Not on file   Housing Stability: Not on file       Subjective    Subjective Data: awake and alert    Objective    Physical Exam:   Assessment Type: Assess only  General Appearance: Alert, Awake  Respiratory Pattern: Normal  Chest Assessment: Chest expansion symmetrical  Bilateral Breath Sounds: Diminished  Cough: Moist, Productive, Strong  O2 Device: hfnc    Vitals:  Blood pressure 142/95, pulse 95, temperature 98 1 °F (36 7 °C), temperature source Bladder, resp  rate 16, height 5' 11" (1 803 m), weight (!) 178 kg (391 lb 8 6 oz), SpO2 93 %  Results from last 7 days   Lab Units 12/05/22  0445 12/03/22  1435 12/03/22  0814   PH ART  7 351   < > 7 256*   PCO2 ART mm Hg 72 4*   < > 86 0*   PO2 ART mm Hg 103 6   < > 80 9   HCO3 ART mmol/L 39 2*   < > 37 4*   BASE EXC ART mmol/L 10 4   < > 6 8   O2 CONTENT ART mL/dL 19 5   < > 19 0   O2 HGB, ARTERIAL % 96 3   < > 93 7*   ABG SOURCE  Line, Arterial   < > Radial, Right   HANNAH TEST   --   --  Yes    < > = values in this interval not displayed  Imaging and other studies: I have personally reviewed pertinent reports        O2 Device: hfnc     Plan    Respiratory Plan: Mild Distress pathway        Resp Comments: remains on hfnc, new bag of veletri started

## 2022-12-05 NOTE — ASSESSMENT & PLAN NOTE
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

## 2022-12-06 LAB
ANION GAP SERPL CALCULATED.3IONS-SCNC: 2 MMOL/L (ref 4–13)
ANION GAP SERPL CALCULATED.3IONS-SCNC: 2 MMOL/L (ref 4–13)
APTT PPP: 42 SECONDS (ref 23–37)
APTT PPP: 52 SECONDS (ref 23–37)
APTT PPP: 76 SECONDS (ref 23–37)
APTT PPP: 85 SECONDS (ref 23–37)
ARTERIAL PATENCY WRIST A: YES
ATRIAL RATE: 136 BPM
ATRIAL RATE: 326 BPM
BASE EXCESS BLDA CALC-SCNC: 11.3 MMOL/L
BASE EXCESS BLDA CALC-SCNC: 11.6 MMOL/L
BASE EXCESS BLDA CALC-SCNC: 11.8 MMOL/L
BASE EXCESS BLDA CALC-SCNC: 13.1 MMOL/L
BASOPHILS # BLD AUTO: 0 THOUSANDS/ÂΜL (ref 0–0.1)
BASOPHILS NFR BLD AUTO: 0 % (ref 0–1)
BUN SERPL-MCNC: 35 MG/DL (ref 5–25)
BUN SERPL-MCNC: 37 MG/DL (ref 5–25)
CA-I BLD-SCNC: 1.2 MMOL/L (ref 1.12–1.32)
CALCIUM SERPL-MCNC: 8.7 MG/DL (ref 8.3–10.1)
CALCIUM SERPL-MCNC: 9 MG/DL (ref 8.3–10.1)
CHLORIDE SERPL-SCNC: 102 MMOL/L (ref 96–108)
CHLORIDE SERPL-SCNC: 103 MMOL/L (ref 96–108)
CO2 SERPL-SCNC: 38 MMOL/L (ref 21–32)
CO2 SERPL-SCNC: 40 MMOL/L (ref 21–32)
CREAT SERPL-MCNC: 0.93 MG/DL (ref 0.6–1.3)
CREAT SERPL-MCNC: 0.95 MG/DL (ref 0.6–1.3)
CRP SERPL QL: <3 MG/L
D DIMER PPP FEU-MCNC: 0.86 UG/ML FEU
EOSINOPHIL # BLD AUTO: 0 THOUSAND/ÂΜL (ref 0–0.61)
EOSINOPHIL NFR BLD AUTO: 0 % (ref 0–6)
ERYTHROCYTE [DISTWIDTH] IN BLOOD BY AUTOMATED COUNT: 15.3 % (ref 11.6–15.1)
FACT XIIIA PPP-ACNC: 265 % (ref 56–140)
GFR SERPL CREATININE-BSD FRML MDRD: 91 ML/MIN/1.73SQ M
GFR SERPL CREATININE-BSD FRML MDRD: 94 ML/MIN/1.73SQ M
GLUCOSE SERPL-MCNC: 143 MG/DL (ref 65–140)
GLUCOSE SERPL-MCNC: 162 MG/DL (ref 65–140)
HCO3 BLDA-SCNC: 40.8 MMOL/L (ref 22–28)
HCO3 BLDA-SCNC: 40.9 MMOL/L (ref 22–28)
HCO3 BLDA-SCNC: 41.1 MMOL/L (ref 22–28)
HCO3 BLDA-SCNC: 41.8 MMOL/L (ref 22–28)
HCT VFR BLD AUTO: 47.6 % (ref 36.5–49.3)
HFNC FLOW LPM: 55
HFNC FLOW LPM: 60
HGB BLD-MCNC: 13.5 G/DL (ref 12–17)
IMM GRANULOCYTES # BLD AUTO: 0.04 THOUSAND/UL (ref 0–0.2)
IMM GRANULOCYTES NFR BLD AUTO: 1 % (ref 0–2)
IPAP: 25
IPAP: 25
LYMPHOCYTES # BLD AUTO: 0.37 THOUSANDS/ÂΜL (ref 0.6–4.47)
LYMPHOCYTES NFR BLD AUTO: 5 % (ref 14–44)
MAGNESIUM SERPL-MCNC: 2.5 MG/DL (ref 1.6–2.6)
MCH RBC QN AUTO: 24.4 PG (ref 26.8–34.3)
MCHC RBC AUTO-ENTMCNC: 28.4 G/DL (ref 31.4–37.4)
MCV RBC AUTO: 86 FL (ref 82–98)
MONOCYTES # BLD AUTO: 0.27 THOUSAND/ÂΜL (ref 0.17–1.22)
MONOCYTES NFR BLD AUTO: 4 % (ref 4–12)
NEUTROPHILS # BLD AUTO: 6.73 THOUSANDS/ÂΜL (ref 1.85–7.62)
NEUTS SEG NFR BLD AUTO: 90 % (ref 43–75)
NON VENT HFNC FIO2: 65
NON VENT HFNC FIO2: 65
NON VENT TYPE HFNC: ABNORMAL
NON VENT TYPE HFNC: ABNORMAL
NON VENT- BIPAP: ABNORMAL
NON VENT- BIPAP: ABNORMAL
NRBC BLD AUTO-RTO: 0 /100 WBCS
O2 CT BLDA-SCNC: 19.1 ML/DL (ref 16–23)
O2 CT BLDA-SCNC: 19.4 ML/DL (ref 16–23)
O2 CT BLDA-SCNC: 19.9 ML/DL (ref 16–23)
O2 CT BLDA-SCNC: 20.3 ML/DL (ref 16–23)
OXYHGB MFR BLDA: 93.4 % (ref 94–97)
OXYHGB MFR BLDA: 95.2 % (ref 94–97)
OXYHGB MFR BLDA: 95.6 % (ref 94–97)
OXYHGB MFR BLDA: 96.1 % (ref 94–97)
PCO2 BLDA: 72.2 MM HG (ref 36–44)
PCO2 BLDA: 76.2 MM HG (ref 36–44)
PCO2 BLDA: 76.9 MM HG (ref 36–44)
PCO2 BLDA: 77 MM HG (ref 36–44)
PEEP MAX SETTING VENT: 15 CM[H2O]
PEEP MAX SETTING VENT: 15 CM[H2O]
PH BLDA: 7.34 [PH] (ref 7.35–7.45)
PH BLDA: 7.35 [PH] (ref 7.35–7.45)
PH BLDA: 7.35 [PH] (ref 7.35–7.45)
PH BLDA: 7.38 [PH] (ref 7.35–7.45)
PHOSPHATE SERPL-MCNC: 3.9 MG/DL (ref 2.7–4.5)
PLATELET # BLD AUTO: 179 THOUSANDS/UL (ref 149–390)
PMV BLD AUTO: 10.2 FL (ref 8.9–12.7)
PO2 BLDA: 77.1 MM HG (ref 75–129)
PO2 BLDA: 87.6 MM HG (ref 75–129)
PO2 BLDA: 94.4 MM HG (ref 75–129)
PO2 BLDA: 95.2 MM HG (ref 75–129)
POTASSIUM SERPL-SCNC: 4.1 MMOL/L (ref 3.5–5.3)
POTASSIUM SERPL-SCNC: 4.1 MMOL/L (ref 3.5–5.3)
PROCALCITONIN SERPL-MCNC: <0.05 NG/ML
QRS AXIS: 107 DEGREES
QRS AXIS: 96 DEGREES
QRSD INTERVAL: 164 MS
QRSD INTERVAL: 168 MS
QT INTERVAL: 386 MS
QT INTERVAL: 400 MS
QTC INTERVAL: 510 MS
QTC INTERVAL: 512 MS
RBC # BLD AUTO: 5.53 MILLION/UL (ref 3.88–5.62)
SODIUM SERPL-SCNC: 143 MMOL/L (ref 135–147)
SODIUM SERPL-SCNC: 144 MMOL/L (ref 135–147)
SPECIMEN SOURCE: ABNORMAL
T WAVE AXIS: -79 DEGREES
T WAVE AXIS: -81 DEGREES
VENT BIPAP FIO2: 80 %
VENT BIPAP FIO2: 80 %
VENTRICULAR RATE: 106 BPM
VENTRICULAR RATE: 98 BPM
WBC # BLD AUTO: 7.41 THOUSAND/UL (ref 4.31–10.16)

## 2022-12-06 RX ORDER — METOPROLOL TARTRATE 50 MG/1
50 TABLET, FILM COATED ORAL EVERY 12 HOURS SCHEDULED
Status: DISCONTINUED | OUTPATIENT
Start: 2022-12-07 | End: 2022-12-12

## 2022-12-06 RX ADMIN — HEPARIN SODIUM 2000 UNITS: 1000 INJECTION INTRAVENOUS; SUBCUTANEOUS at 02:11

## 2022-12-06 RX ADMIN — ISODIUM CHLORIDE 3 ML: 0.03 SOLUTION RESPIRATORY (INHALATION) at 21:00

## 2022-12-06 RX ADMIN — ATORVASTATIN CALCIUM 10 MG: 10 TABLET, FILM COATED ORAL at 16:34

## 2022-12-06 RX ADMIN — LEVALBUTEROL HYDROCHLORIDE 1.25 MG: 1.25 SOLUTION, CONCENTRATE RESPIRATORY (INHALATION) at 15:14

## 2022-12-06 RX ADMIN — SILDENAFIL 20 MG: 20 TABLET ORAL at 08:30

## 2022-12-06 RX ADMIN — BARICITINIB 2 MG: 2 TABLET, FILM COATED ORAL at 00:09

## 2022-12-06 RX ADMIN — ISODIUM CHLORIDE 3 ML: 0.03 SOLUTION RESPIRATORY (INHALATION) at 15:14

## 2022-12-06 RX ADMIN — SILDENAFIL 20 MG: 20 TABLET ORAL at 20:38

## 2022-12-06 RX ADMIN — EPOPROSTENOL 49.8 NG/KG/MIN: 1.5 INJECTION, POWDER, LYOPHILIZED, FOR SOLUTION INTRAVENOUS at 10:04

## 2022-12-06 RX ADMIN — BARICITINIB 2 MG: 2 TABLET, FILM COATED ORAL at 10:19

## 2022-12-06 RX ADMIN — EPOPROSTENOL 49.8 NG/KG/MIN: 1.5 INJECTION, POWDER, LYOPHILIZED, FOR SOLUTION INTRAVENOUS at 15:59

## 2022-12-06 RX ADMIN — EPOPROSTENOL 49.8 NG/KG/MIN: 1.5 INJECTION, POWDER, LYOPHILIZED, FOR SOLUTION INTRAVENOUS at 22:31

## 2022-12-06 RX ADMIN — SILDENAFIL 20 MG: 20 TABLET ORAL at 16:34

## 2022-12-06 RX ADMIN — LEVALBUTEROL HYDROCHLORIDE 1.25 MG: 1.25 SOLUTION, CONCENTRATE RESPIRATORY (INHALATION) at 21:00

## 2022-12-06 RX ADMIN — DEXAMETHASONE SODIUM PHOSPHATE 18.4 MG: 10 INJECTION, SOLUTION INTRAMUSCULAR; INTRAVENOUS at 19:43

## 2022-12-06 RX ADMIN — METOPROLOL TARTRATE 25 MG: 25 TABLET, FILM COATED ORAL at 20:38

## 2022-12-06 RX ADMIN — DEXAMETHASONE SODIUM PHOSPHATE 18.4 MG: 10 INJECTION, SOLUTION INTRAMUSCULAR; INTRAVENOUS at 08:23

## 2022-12-06 RX ADMIN — ISODIUM CHLORIDE 3 ML: 0.03 SOLUTION RESPIRATORY (INHALATION) at 09:16

## 2022-12-06 RX ADMIN — METOPROLOL TARTRATE 25 MG: 25 TABLET, FILM COATED ORAL at 08:29

## 2022-12-06 RX ADMIN — HEPARIN SODIUM 20 UNITS/KG/HR: 10000 INJECTION, SOLUTION INTRAVENOUS at 22:35

## 2022-12-06 RX ADMIN — EPOPROSTENOL 49.8 NG/KG/MIN: 1.5 INJECTION, POWDER, LYOPHILIZED, FOR SOLUTION INTRAVENOUS at 04:30

## 2022-12-06 RX ADMIN — HEPARIN SODIUM 4000 UNITS: 1000 INJECTION INTRAVENOUS; SUBCUTANEOUS at 10:25

## 2022-12-06 RX ADMIN — HEPARIN SODIUM 17.1 UNITS/KG/HR: 10000 INJECTION, SOLUTION INTRAVENOUS at 08:15

## 2022-12-06 RX ADMIN — LEVALBUTEROL HYDROCHLORIDE 1.25 MG: 1.25 SOLUTION, CONCENTRATE RESPIRATORY (INHALATION) at 09:16

## 2022-12-06 RX ADMIN — REMDESIVIR 100 MG: 100 INJECTION, POWDER, LYOPHILIZED, FOR SOLUTION INTRAVENOUS at 00:09

## 2022-12-06 NOTE — ASSESSMENT & PLAN NOTE
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

## 2022-12-06 NOTE — PLAN OF CARE
Problem: Potential for Falls  Goal: Patient will remain free of falls  Description: INTERVENTIONS:  - Educate patient/family on patient safety including physical limitations  - Instruct patient to call for assistance with activity   - Consult OT/PT to assist with strengthening/mobility   - Keep Call bell within reach  - Keep bed low and locked with side rails adjusted as appropriate  - Keep care items and personal belongings within reach  - Initiate and maintain comfort rounds  - Make Fall Risk Sign visible to staff  - Offer Toileting every 2 Hours, in advance of need  - Initiate/Maintain alarm  - Obtain necessary fall risk management equipment:   - Apply yellow socks and bracelet for high fall risk patients  - Consider moving patient to room near nurses station  Outcome: Progressing     Problem: Nutrition/Hydration-ADULT  Goal: Nutrient/Hydration intake appropriate for improving, restoring or maintaining nutritional needs  Description: Monitor and assess patient's nutrition/hydration status for malnutrition  Collaborate with interdisciplinary team and initiate plan and interventions as ordered  Monitor patient's weight and dietary intake as ordered or per policy  Utilize nutrition screening tool and intervene as necessary  Determine patient's food preferences and provide high-protein, high-caloric foods as appropriate       INTERVENTIONS:  - Monitor oral intake, urinary output, labs, and treatment plans  - Assess nutrition and hydration status and recommend course of action  - Evaluate amount of meals eaten  - Assist patient with eating if necessary   - Allow adequate time for meals  - Recommend/ encourage appropriate diets, oral nutritional supplements, and vitamin/mineral supplements  - Order, calculate, and assess calorie counts as needed  - Recommend, monitor, and adjust tube feedings and TPN/PPN based on assessed needs  - Assess need for intravenous fluids  - Provide specific nutrition/hydration education as appropriate  - Include patient/family/caregiver in decisions related to nutrition  Outcome: Progressing     Problem: MOBILITY - ADULT  Goal: Maintain or return to baseline ADL function  Description: INTERVENTIONS:  -  Assess patient's ability to carry out ADLs; assess patient's baseline for ADL function and identify physical deficits which impact ability to perform ADLs (bathing, care of mouth/teeth, toileting, grooming, dressing, etc )  - Assess/evaluate cause of self-care deficits   - Assess range of motion  - Assess patient's mobility; develop plan if impaired  - Assess patient's need for assistive devices and provide as appropriate  - Encourage maximum independence but intervene and supervise when necessary  - Involve family in performance of ADLs  - Assess for home care needs following discharge   - Consider OT consult to assist with ADL evaluation and planning for discharge  - Provide patient education as appropriate  Outcome: Progressing  Goal: Maintains/Returns to pre admission functional level  Description: INTERVENTIONS:  - Perform BMAT or MOVE assessment daily    - Set and communicate daily mobility goal to care team and patient/family/caregiver  - Collaborate with rehabilitation services on mobility goals if consulted  - Perform Range of Motion 3 times a day  - Reposition patient every 2 hours    - Dangle patient  times a day  - Stand patient  times a day  - Ambulate patient  times a day  - Out of bed to chair  times a day   - Out of bed for meals  times a day  - Out of bed for toileting  - Record patient progress and toleration of activity level   Outcome: Progressing     Problem: Prexisting or High Potential for Compromised Skin Integrity  Goal: Skin integrity is maintained or improved  Description: INTERVENTIONS:  - Identify patients at risk for skin breakdown  - Assess and monitor skin integrity  - Assess and monitor nutrition and hydration status  - Monitor labs   - Assess for incontinence   - Turn and reposition patient  - Assist with mobility/ambulation  - Relieve pressure over bony prominences  - Avoid friction and shearing  - Provide appropriate hygiene as needed including keeping skin clean and dry  - Evaluate need for skin moisturizer/barrier cream  - Collaborate with interdisciplinary team   - Patient/family teaching  - Consider wound care consult   Outcome: Progressing     Problem: CARDIOVASCULAR - ADULT  Goal: Maintains optimal cardiac output and hemodynamic stability  Description: INTERVENTIONS:  - Monitor I/O, vital signs and rhythm  - Monitor for S/S and trends of decreased cardiac output  - Administer and titrate ordered vasoactive medications to optimize hemodynamic stability  - Assess quality of pulses, skin color and temperature  - Assess for signs of decreased coronary artery perfusion  - Instruct patient to report change in severity of symptoms  Outcome: Progressing  Goal: Absence of cardiac dysrhythmias or at baseline rhythm  Description: INTERVENTIONS:  - Continuous cardiac monitoring, vital signs, obtain 12 lead EKG if ordered  - Administer antiarrhythmic and heart rate control medications as ordered  - Monitor electrolytes and administer replacement therapy as ordered  Outcome: Progressing     Problem: RESPIRATORY - ADULT  Goal: Achieves optimal ventilation and oxygenation  Description: INTERVENTIONS:  - Assess for changes in respiratory status  - Assess for changes in mentation and behavior  - Position to facilitate oxygenation and minimize respiratory effort  - Oxygen administered by appropriate delivery if ordered  - Initiate smoking cessation education as indicated  - Encourage broncho-pulmonary hygiene including cough, deep breathe, Incentive Spirometry  - Assess the need for suctioning and aspirate as needed  - Assess and instruct to report SOB or any respiratory difficulty  - Respiratory Therapy support as indicated  Outcome: Progressing     Problem: GENITOURINARY - ADULT  Goal: Urinary catheter remains patent  Description: INTERVENTIONS:  - Assess patency of urinary catheter  - If patient has a chronic diallo, consider changing catheter if non-functioning  - Follow guidelines for intermittent irrigation of non-functioning urinary catheter  Outcome: Progressing     Problem: METABOLIC, FLUID AND ELECTROLYTES - ADULT  Goal: Electrolytes maintained within normal limits  Description: INTERVENTIONS:  - Monitor labs and assess patient for signs and symptoms of electrolyte imbalances  - Administer electrolyte replacement as ordered  - Monitor response to electrolyte replacements, including repeat lab results as appropriate  - Instruct patient on fluid and nutrition as appropriate  Outcome: Progressing  Goal: Glucose maintained within target range  Description: INTERVENTIONS:  - Monitor Blood Glucose as ordered  - Assess for signs and symptoms of hyperglycemia and hypoglycemia  - Administer ordered medications to maintain glucose within target range  - Assess nutritional intake and initiate nutrition service referral as needed  Outcome: Progressing

## 2022-12-06 NOTE — RESPIRATORY THERAPY NOTE
Received patient on HFNC 65% 60L, monitored Veletri and changed as needed  Continues to run at maximum dose, as per PA   Spo2 92%

## 2022-12-06 NOTE — ASSESSMENT & PLAN NOTE
· Infectious markers at presentation:  · CRP 20 0  · Now down to < 3  · Trend Q2 days   Daily if worsening  · D-Dimer 3 50  · Started on heparin gtt for AFib  · PE study on arrival negative  · LE Duplex negative  · D-Dimer now 0 86  · Oxygen: HFNC 80% 60L  · Pathway: Very severe  · Steroids: Decadron 0 1 mg/kg IV Q12 Day 5/10  · Can stop if clinical improvement earlier  · Baricitinib: Ordered for day 5 but only has received 3 doses given midnight timing and patient on BiPAP  · Changed timing to QPM  · If on RA for 24 hours can D/C  · Antibiotics  · None  · Procal <0 05  · Anticoagulation  · Hep gtt for AFib  · Remdesivir: Completed  · Discussed importance of self proning  · OOB TID, early mobilization

## 2022-12-06 NOTE — RESPIRATORY THERAPY NOTE
12/05/22 2142   Respiratory Assessment   General Appearance Drowsy   Resp Comments removed from HF and placed on NIV for HS use, RN aware   O2 Device v60 Eleven   Non-Invasive Information   O2 Interface Device Face mask   Non-Invasive Ventilation Mode BiPAP   SpO2 93 %   $ Pulse Oximetry Spot Check Charge Completed   Non-Invasive Settings   IPAP (cm) 25 cm   EPAP (cm) 15 cm   Rate (Set) 10   FiO2 (%) 80   Rise Time 2  (with 5 min ramp applied)   Inspiratory Time (Set) 0 8   Humidification   (heater)   Temperature (Set) 31   Non-Invasive Readings   Skin Intervention Skin intact   Total Rate 21   Vt (mL) (Mech) 587   MV (Mech) 12 6   Peak Pressure (Obs) 21   Heater Temperature (Obs)   (unit warming)   Leak (lpm) 10   Non-Invasive Alarms   Insp Pressure High (cm H20) 35   Insp Pressure Low (cm H20) 8   Low Insp Pressure Time (sec) 20 sec   MV Low (L/min) 3   Vt High (mL) 1400   Vt Low (mL) 250   High Resp Rate (BPM) 35 BPM   Low Resp Rate (BPM) 8 BPM   Apnea Interval (sec) 20   Maintenance   Water bag changed No   Continue veletri infusion with aerogen in line

## 2022-12-06 NOTE — ASSESSMENT & PLAN NOTE
· Now back at baseline  · Continue to monitor I&O and renal indices  · Holding diuresis as patient appears euvolemic

## 2022-12-06 NOTE — PROGRESS NOTES
[facility]  Progress Note Fermin Jones 1970, 46 y o  male MRN: [id number]  Unit/Bed#:  Encounter: [id number]  Primary Care Provider: No primary care provider on file  Date and time admitted to hospital: 12/1/2022  5:52 PM    Elevated serum creatinine  Assessment & Plan  · Now back at baseline  · Continue to monitor I&O and renal indices  · Holding diuresis as patient appears euvolemic    Pulmonary hypertension (HCC)  Assessment & Plan  Continue sildenafil and epoprostenol    Obstructive sleep apnea  Assessment & Plan  BiPAP at [facility]  Patient 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  Would benefit from sleep study as outpatient  Morbid obesity ([facility] [address] )  Assessment & Plan  Nutrition consulted  Encourage lifestyle modifications    Mixed hyperlipidemia  Assessment & Plan  Continue home statin    CHF (congestive heart failure) ([facility])  Assessment & Plan  Wt Readings from Last 3 Encounters:   12/04/22 (!) 178 kg (391 lb 8 6 oz)     · Hold lasix given euvolemic state, contraction alkalemia; Continue negative fluid balance  · Daily weights  · Continue epoprostenol to offload right heart attempt to wean when oxygen requirements decrease   · restarted sildenafil      * Acute respiratory failure due to COVID-19 [facility])  Assessment & Plan  · Infectious markers at presentation:  · CRP 20 0  · Now down to < 3  · Trend Q2 days   Daily if worsening  · D-Dimer 3 50  · Started on heparin gtt for AFib  · PE study on arrival negative  · LE Duplex negative  · D-Dimer now 0 86  · Oxygen: HFNC 80% 60L  · Pathway: Very severe  · Steroids: Decadron 0 1 mg/kg IV Q12 Day 5/10  · Can stop if clinical improvement earlier  · Baricitinib: Ordered for day 5 but only has received 3 doses given midnight timing and patient on BiPAP  · Changed timing to QPM  · If on RA for 24 hours can D/C  · Antibiotics  · None  · Procal <0 05  · Anticoagulation  · Hep gtt for AFib  · Remdesivir: Completed  · Discussed importance of self proning  · OOB TID, early mobilization           ----------------------------------------------------------------------------------------  HPI:  46year old male with PMH of morbid obesity, HFpEF, pulmonay HTN, likely undiagnosed BISHNU who presented with severe COVID with worsening O2 requirements requiring HFNC and BiPAP    24hr events:   · Diruesis held  · Started on ACS Hep gtt for pAF; Given amio x1, started home metoprolol and dose increased this AM  · Started on CLD  · No movement on HFNC or BiPAP  · COVID markers improving    Patient appropriate for transfer out of the ICU today?: No  Disposition: Continue Stepdown Level 1 level of care   Code Status: Level 1 - Full Code  ---------------------------------------------------------------------------------------  SUBJECTIVE  Feels well, no complaints    Review of Systems  Review of systems was reviewed and negative unless stated above in HPI/24-hour events   ---------------------------------------------------------------------------------------  OBJECTIVE    Vitals   Vitals:    22 0446 22 0500 22 0600 22 0829   BP:    (!) 170/110   BP Location:       Pulse:  (!) 109 (!) 111 (!) 121   Resp:  21 (!) 26    Temp:  97 7 °F (36 5 °C) 97 5 °F (36 4 °C)    TempSrc:       SpO2: 92% 91% 95%    Weight:       Height:         Temp (24hrs), Av 8 °F (36 6 °C), Min:97 5 °F (36 4 °C), Max:98 1 °F (36 7 °C)  Current: Temperature: 97 5 °F (36 4 °C)  Arterial Line BP: 167/97  Arterial Line MAP (mmHg): 118 mmHg    Respiratory:  SpO2: SpO2: 95 %, SpO2 Activity: SpO2 Activity: At Rest, SpO2 Device: O2 Device: High flow nasal cannula  Nasal Cannula O2 Flow Rate (L/min): 60 L/min (HF)    Invasive/non-invasive ventilation settings   Respiratory    Lab Data (Last 4 hours)       0437            pH, Arterial       7 346             pCO2, Arterial       76 9             pO2, Arterial       94 4             HCO3, Arterial 41 1             Base Excess, Arterial       11 8                  O2/Vent Data       12/06 0425   Most Recent        Non-Invasive Ventilation Mode BiPAP  HFNC (High flow)                  Physical Exam  Vitals and nursing note reviewed  Constitutional:       Appearance: He is morbidly obese  He is not ill-appearing  Comments: HFNC in place   HENT:      Head: Normocephalic  Nose: Nose normal       Mouth/Throat:      Mouth: Mucous membranes are dry  Eyes:      Conjunctiva/sclera: Conjunctivae normal    Cardiovascular:      Rate and Rhythm: Tachycardia present  Rhythm irregular  Heart sounds: Normal heart sounds  Pulmonary:      Effort: Tachypnea present  No respiratory distress  Breath sounds: Decreased air movement present  Decreased breath sounds present  Abdominal:      General: Abdomen is flat  Bowel sounds are normal  There is distension  Palpations: Abdomen is soft  Tenderness: There is no abdominal tenderness  Genitourinary:     Comments: Deferred  Musculoskeletal:      Cervical back: Neck supple  Right lower leg: No edema  Left lower leg: No edema  Skin:     General: Skin is warm and dry  Capillary Refill: Capillary refill takes less than 2 seconds  Neurological:      General: No focal deficit present  Mental Status: He is oriented to person, place, and time               Laboratory and Diagnostics:  Results from last 7 days   Lab Units 12/06/22  0436 12/05/22  1109 12/05/22  0440 12/04/22  0556 12/03/22  0458 12/03/22  0218 12/03/22  0211 12/02/22  2155 12/02/22  0448 12/01/22  1822   WBC Thousand/uL 7 41 10 41* 9 95 9 53 10 38*  --   --   --  10 64* 9 50   HEMOGLOBIN g/dL 13 5 14 0 13 5 13 8 13 6  --   --   --  14 4 15 2   I STAT HEMOGLOBIN g/dl  --   --   --   --   --  15 6 15 6   < >  --   --    HEMATOCRIT % 47 6 49 3 48 4 49 1 49 3  --   --   --  51 5* 52 3*   HEMATOCRIT, ISTAT %  --   --   --   --   --  46 46   < >  --   -- PLATELETS Thousands/uL 179 219 206 229 232  --   --   --  232 269   NEUTROS PCT % 90*  --  91* 89* 90*  --   --   --   --  65   MONOS PCT % 4  --  4 4 2*  --   --   --   --  12    < > = values in this interval not displayed  Results from last 7 days   Lab Units 12/06/22  0436 12/06/22  0015 12/05/22  1800 12/05/22  1109 12/05/22  0440 12/05/22  0057 12/04/22  1733 12/04/22  1158 12/04/22  0556 12/03/22  1054 12/03/22  0458 12/02/22  0448 12/01/22  1822   SODIUM mmol/L 143 144 150* 150* 148* 150* 150*   < > 149*   < > 147   < > 145   POTASSIUM mmol/L 4 1 4 1 4 0 4 2 4 3 4 2 4 5   < > 4 7   < > 5 5*   < > 4 5   CHLORIDE mmol/L 103 102 103 104 105 104 105   < > 103   < > 103   < > 105   CO2 mmol/L 38* 40* 40* 39* 34* 40* 39*   < > 40*   < > 37*   < > 33*   CO2, I-STAT   --   --   --   --   --   --   --   --   --   --   --    < >  --    ANION GAP mmol/L 2* 2* 7 7 9 6 6   < > 6   < > 7   < > 7   BUN mg/dL 35* 37* 39* 41* 43* 44* 42*   < > 39*   < > 32*   < > 37*   CREATININE mg/dL 0 93 0 95 1 06 1 15 1 18 1 24 1 25   < > 1 32*   < > 1 31*   < > 1 39*   CALCIUM mg/dL 9 0 8 7 9 1 9 2 9 1 9 0 9 1   < > 9 1   < > 8 4   < > 8 7   GLUCOSE RANDOM mg/dL 162* 143* 174* 150* 153* 143* 154*   < > 164*   < > 146*   < > 113   ALT U/L  --   --   --   --  27  --   --   --  32  --  28  --  36   AST U/L  --   --   --   --  25  --   --   --  36  --  22  --  24   ALK PHOS U/L  --   --   --   --  45*  --   --   --  49  --  54  --  68   ALBUMIN g/dL  --   --   --   --  3 1*  --   --   --  3 2*  --  3 0*  --  3 2*   TOTAL BILIRUBIN mg/dL  --   --   --   --  0 70  --   --   --  0 52  --  0 50  --  0 57    < > = values in this interval not displayed       Results from last 7 days   Lab Units 12/06/22  0436 12/05/22  0440 12/04/22  0556 12/03/22  0458 12/01/22  1822   MAGNESIUM mg/dL 2 5 2 3 2 2 2 0 2 2   PHOSPHORUS mg/dL 3 9 4 2 4 6* 4 2  --       Results from last 7 days   Lab Units 12/06/22  0105 12/05/22  1800 12/05/22  1109 12/03/22  0458 12/03/22  0049 12/02/22  2301 12/02/22  1256 12/02/22  0532 12/01/22  2238   INR   --   --  1 23*  --   --   --   --   --  1 18   PTT seconds 52* 32 26 62* 69* 36 32   < > 22*    < > = values in this interval not displayed  ABG:  Results from last 7 days   Lab Units 12/06/22  0437   PH ART  7 346*   PCO2 ART mm Hg 76 9*   PO2 ART mm Hg 94 4   HCO3 ART mmol/L 41 1*   BASE EXC ART mmol/L 11 8   ABG SOURCE  Line, Arterial     VBG:  Results from last 7 days   Lab Units 12/06/22  0437 12/03/22  1435 12/03/22  1054   PH HERNESTO   --   --  7 250*   PCO2 HERNESTO mm Hg  --   --  98 1*   PO2 HERNESTO mm Hg  --   --  63 7*   HCO3 HERNESTO mmol/L  --   --  42 1*   BASE EXC HERNESTO mmol/L  --   --  10 2   ABG SOURCE  Line, Arterial   < >  --     < > = values in this interval not displayed  Results from last 7 days   Lab Units 12/06/22  0436 12/03/22  0458 12/02/22  1256   PROCALCITONIN ng/ml <0 05 0 07 0 11       Micro        EKG:   Imaging: I have personally reviewed pertinent films in PACS    Intake and Output  I/O       12/04 0701 12/05 0700 12/05 0701 12/06 0700 12/06 0701 12/07 0700    P  O   120     I V  (mL/kg) 270 (1 5) 579 6 (3 3)     IV Piggyback  250     Total Intake(mL/kg) 270 (1 5) 949 6 (5 3)     Urine (mL/kg/hr) 4050 (0 9) 3700 (0 9)     Total Output 4050 3700     Net -3780 -2750 4                  Height and Weights   Height: 5' 11" (180 3 cm)  IBW (Ideal Body Weight): 75 3 kg  Body mass index is 54 61 kg/m²  Weight (last 2 days)     Date/Time Weight    12/04/22 0900 178 (391 54)            Nutrition       Diet Orders   (From admission, onward)             Start     Ordered    12/05/22 1219  Diet Clear Liquid  Diet effective now        Comments: LIMIT VOLUME to 250cc daily   References:    Nutrtion Support Algorithm Enteral vs  Parenteral   Question Answer Comment   Diet Type Clear Liquid    RD to adjust diet per protocol?  Yes        12/05/22 1219                  Active Medications  Scheduled Meds:  Current Facility-Administered Medications   Medication Dose Route Frequency Provider Last Rate   • acetaminophen  650 mg Oral Q6H PRN ELIZABETH Rutledge     • albuterol  2 puff Inhalation Q4H PRN ELIZABETH Rutledge     • atorvastatin  10 mg Oral Daily With US ELIZABETH Moreau     • baricitinib  2 mg Oral Q24H Humaira Cea, PA-C      Followed by   • [START ON 12/7/2022] baricitinib  4 mg Oral Q24H Humaira Cea, PA-C     • dexamethasone  0 1 mg/kg Intravenous Q12H ELIZABETH Rutledge 18 4 mg (12/06/22 9917)   • dextromethorphan-guaiFENesin  10 mL Oral Q4H PRN ELIZABETH Rutledge     • epoprostenol  6 25-50 ng/kg/min (Ideal) Inhalation Titrated Angie Diego DO 49 801 ng/kg/min (12/06/22 0430)   • heparin (porcine)  3-20 Units/kg/hr (Order-Specific) Intravenous Titrated Humaira Cea, PA-C 17 1 Units/kg/hr (12/06/22 0815)   • heparin (porcine)  2,000 Units Intravenous Q1H PRN Humaira Cea, PA-C     • heparin (porcine)  4,000 Units Intravenous Q1H PRN Humaira Cea, PA-C     • levalbuterol  1 25 mg Nebulization TID Shelba Balloon, CRNP     • metoprolol tartrate  25 mg Oral Q12H [address] RICK Gerardo Cea-C     • sildenafil  20 mg Oral TID ELIZABETH Araujo     • sodium chloride  3 mL Nebulization TID Shelba Balloon, CRNP       Continuous Infusions:  epoprostenol, 6 25-50 ng/kg/min (Ideal), Last Rate: 49 801 ng/kg/min (12/06/22 0430)  heparin (porcine), 3-20 Units/kg/hr (Order-Specific), Last Rate: 17 1 Units/kg/hr (12/06/22 0815)      PRN Meds:   acetaminophen, 650 mg, Q6H PRN  albuterol, 2 puff, Q4H PRN  dextromethorphan-guaiFENesin, 10 mL, Q4H PRN  heparin (porcine), 2,000 Units, Q1H PRN  heparin (porcine), 4,000 Units, Q1H PRN        Invasive Devices Review  Invasive Devices     Peripheral Intravenous Line  Duration           Peripheral IV Distal;Left;Upper Forearm -- days    Peripheral IV 12/03/22 Right Antecubital 3 days Peripheral IV 12/03/22 Left;Ventral (anterior) Forearm 2 days          Arterial Line  Duration           Arterial Line 12/03/22 Radial 2 days          Drain  Duration           Urethral Catheter 3 days                Rationale for remaining devices: Continue devices for today  ---------------------------------------------------------------------------------------  Advance Directive and Living Will:      Power of :    POLST:    ---------------------------------------------------------------------------------------  Care Time Delivered:   No Critical Care time spent       Zachary Miller PA-C      Portions of the record may have been created with voice recognition software  Occasional wrong word or "sound a like" substitutions may have occurred due to the inherent limitations of voice recognition software    Read the chart carefully and recognize, using context, where substitutions have occurred

## 2022-12-06 NOTE — ASSESSMENT & PLAN NOTE
Wt Readings from Last 3 Encounters:   12/04/22 (!) 178 kg (391 lb 8 6 oz)     · Hold lasix given euvolemic state, contraction alkalemia; Continue negative fluid balance  · Daily weights  · Continue epoprostenol to offload right heart attempt to wean when oxygen requirements decrease   · restarted sildenafil

## 2022-12-06 NOTE — RESPIRATORY THERAPY NOTE
12/05/22 1943   Respiratory Assessment   General Appearance Sleeping   Respiratory Pattern Normal   Chest Assessment Chest expansion symmetrical   Resp Comments resting quielty   O2 Device opti flow   Oxygen Therapy/Pulse Ox   O2 Device High flow nasal cannula   O2 Therapy Oxygen humidified   Nasal Cannula O2 Flow Rate (L/min) 60 L/min  (HF)   Calculated FIO2 (%) - Nasal Cannula 260   FiO2 (%) 70  (HF)   SpO2 96 %   SpO2 Activity At Rest   $ Pulse Oximetry Spot Check Charge Completed

## 2022-12-07 PROBLEM — A41.9 SEPSIS (HCC): Status: ACTIVE | Noted: 2022-12-07

## 2022-12-07 LAB
ALBUMIN SERPL BCP-MCNC: 3 G/DL (ref 3.5–5)
ALP SERPL-CCNC: 44 U/L (ref 46–116)
ALT SERPL W P-5'-P-CCNC: 32 U/L (ref 12–78)
ANION GAP SERPL CALCULATED.3IONS-SCNC: 4 MMOL/L (ref 4–13)
APTT PPP: 75 SECONDS (ref 23–37)
AST SERPL W P-5'-P-CCNC: 16 U/L (ref 5–45)
BASE EXCESS BLDA CALC-SCNC: 10.1 MMOL/L
BASE EXCESS BLDA CALC-SCNC: 10.2 MMOL/L
BASE EXCESS BLDA CALC-SCNC: 13.5 MMOL/L
BASE EXCESS BLDA CALC-SCNC: 8.8 MMOL/L
BILIRUB SERPL-MCNC: 1.01 MG/DL (ref 0.2–1)
BUN SERPL-MCNC: 35 MG/DL (ref 5–25)
CALCIUM ALBUM COR SERPL-MCNC: 9.7 MG/DL (ref 8.3–10.1)
CALCIUM SERPL-MCNC: 8.9 MG/DL (ref 8.3–10.1)
CHLORIDE SERPL-SCNC: 103 MMOL/L (ref 96–108)
CO2 SERPL-SCNC: 37 MMOL/L (ref 21–32)
CREAT SERPL-MCNC: 0.9 MG/DL (ref 0.6–1.3)
GFR SERPL CREATININE-BSD FRML MDRD: 97 ML/MIN/1.73SQ M
GLUCOSE SERPL-MCNC: 156 MG/DL (ref 65–140)
HCO3 BLDA-SCNC: 37.7 MMOL/L (ref 22–28)
HCO3 BLDA-SCNC: 38.5 MMOL/L (ref 22–28)
HCO3 BLDA-SCNC: 39 MMOL/L (ref 22–28)
HCO3 BLDA-SCNC: 42.6 MMOL/L (ref 22–28)
HFNC FLOW LPM: 55
HFNC FLOW LPM: 55
IPAP: 25
MAGNESIUM SERPL-MCNC: 2.1 MG/DL (ref 1.6–2.6)
NON VENT HFNC FIO2: 60
NON VENT HFNC FIO2: 60
NON VENT TYPE HFNC: ABNORMAL
NON VENT TYPE HFNC: ABNORMAL
NON VENT- BIPAP: ABNORMAL
O2 CT BLDA-SCNC: 16.1 ML/DL (ref 16–23)
O2 CT BLDA-SCNC: 19.3 ML/DL (ref 16–23)
O2 CT BLDA-SCNC: 19.4 ML/DL (ref 16–23)
O2 CT BLDA-SCNC: 19.8 ML/DL (ref 16–23)
OXYHGB MFR BLDA: 76.2 % (ref 94–97)
OXYHGB MFR BLDA: 91.9 % (ref 94–97)
OXYHGB MFR BLDA: 93.4 % (ref 94–97)
OXYHGB MFR BLDA: 94.4 % (ref 94–97)
PCO2 BLDA: 67.9 MM HG (ref 36–44)
PCO2 BLDA: 70.9 MM HG (ref 36–44)
PCO2 BLDA: 71.3 MM HG (ref 36–44)
PCO2 BLDA: 75.2 MM HG (ref 36–44)
PEEP MAX SETTING VENT: 15 CM[H2O]
PH BLDA: 7.34 [PH] (ref 7.35–7.45)
PH BLDA: 7.36 [PH] (ref 7.35–7.45)
PH BLDA: 7.37 [PH] (ref 7.35–7.45)
PH BLDA: 7.37 [PH] (ref 7.35–7.45)
PHOSPHATE SERPL-MCNC: 3.9 MG/DL (ref 2.7–4.5)
PO2 BLDA: 41.8 MM HG (ref 75–129)
PO2 BLDA: 67.7 MM HG (ref 75–129)
PO2 BLDA: 74.6 MM HG (ref 75–129)
PO2 BLDA: 80.8 MM HG (ref 75–129)
POTASSIUM SERPL-SCNC: 4.4 MMOL/L (ref 3.5–5.3)
PROT SERPL-MCNC: 6.2 G/DL (ref 6.4–8.4)
SODIUM SERPL-SCNC: 144 MMOL/L (ref 135–147)
SPECIMEN SOURCE: ABNORMAL
VENT BIPAP FIO2: 80 %

## 2022-12-07 RX ADMIN — DEXAMETHASONE SODIUM PHOSPHATE 18.4 MG: 10 INJECTION, SOLUTION INTRAMUSCULAR; INTRAVENOUS at 08:19

## 2022-12-07 RX ADMIN — ISODIUM CHLORIDE 3 ML: 0.03 SOLUTION RESPIRATORY (INHALATION) at 19:58

## 2022-12-07 RX ADMIN — DEXAMETHASONE SODIUM PHOSPHATE 18.4 MG: 10 INJECTION, SOLUTION INTRAMUSCULAR; INTRAVENOUS at 19:47

## 2022-12-07 RX ADMIN — AMIODARONE HYDROCHLORIDE 150 MG: 50 INJECTION, SOLUTION INTRAVENOUS at 17:14

## 2022-12-07 RX ADMIN — METOPROLOL TARTRATE 50 MG: 50 TABLET, FILM COATED ORAL at 20:00

## 2022-12-07 RX ADMIN — LEVALBUTEROL HYDROCHLORIDE 1.25 MG: 1.25 SOLUTION, CONCENTRATE RESPIRATORY (INHALATION) at 09:04

## 2022-12-07 RX ADMIN — HEPARIN SODIUM 20 UNITS/KG/HR: 10000 INJECTION, SOLUTION INTRAVENOUS at 17:37

## 2022-12-07 RX ADMIN — SILDENAFIL 20 MG: 20 TABLET ORAL at 08:19

## 2022-12-07 RX ADMIN — LEVALBUTEROL HYDROCHLORIDE 1.25 MG: 1.25 SOLUTION, CONCENTRATE RESPIRATORY (INHALATION) at 19:58

## 2022-12-07 RX ADMIN — BARICITINIB 4 MG: 2 TABLET, FILM COATED ORAL at 08:19

## 2022-12-07 RX ADMIN — EPOPROSTENOL 25.23 NG/KG/MIN: 1.5 INJECTION, POWDER, LYOPHILIZED, FOR SOLUTION INTRAVENOUS at 11:07

## 2022-12-07 RX ADMIN — ISODIUM CHLORIDE 3 ML: 0.03 SOLUTION RESPIRATORY (INHALATION) at 14:53

## 2022-12-07 RX ADMIN — METOPROLOL TARTRATE 50 MG: 50 TABLET, FILM COATED ORAL at 08:19

## 2022-12-07 RX ADMIN — SILDENAFIL 20 MG: 20 TABLET ORAL at 17:37

## 2022-12-07 RX ADMIN — ISODIUM CHLORIDE 3 ML: 0.03 SOLUTION RESPIRATORY (INHALATION) at 09:04

## 2022-12-07 RX ADMIN — EPOPROSTENOL 49.8 NG/KG/MIN: 1.5 INJECTION, POWDER, LYOPHILIZED, FOR SOLUTION INTRAVENOUS at 04:19

## 2022-12-07 RX ADMIN — LEVALBUTEROL HYDROCHLORIDE 1.25 MG: 1.25 SOLUTION, CONCENTRATE RESPIRATORY (INHALATION) at 14:53

## 2022-12-07 RX ADMIN — DEXTROSE MONOHYDRATE 150 MG: 50 INJECTION, SOLUTION INTRAVENOUS at 19:04

## 2022-12-07 RX ADMIN — ATORVASTATIN CALCIUM 10 MG: 10 TABLET, FILM COATED ORAL at 17:13

## 2022-12-07 RX ADMIN — SILDENAFIL 20 MG: 20 TABLET ORAL at 22:13

## 2022-12-07 NOTE — RESPIRATORY THERAPY NOTE
12/07/22 0904   Respiratory Assessment   Assessment Type Pre-treatment   General Appearance Alert; Awake   Respiratory Pattern Normal   Chest Assessment Chest expansion symmetrical   Bilateral Breath Sounds Diminished   Cough None   Resp Comments Pt remains on HFNC at this time  No changes made  Will wean as able  Tx given     O2 Device opti   Non-Invasive Information   O2 Interface Device HFNC prongs   Non-Invasive Ventilation Mode HFNC (High flow)   SpO2 94 %   $ Pulse Oximetry Spot Check Charge Completed   Non-Invasive Settings   FiO2 (%) 65   Flow (lpm) 55   Humidification   (heater)   Temperature (Set) 31   Non-Invasive Readings   Skin Intervention Skin intact   Heater Temperature (Obs)   (warming; found off)   Maintenance   Water bag changed No

## 2022-12-07 NOTE — ASSESSMENT & PLAN NOTE
Wt Readings from Last 3 Encounters:   12/04/22 (!) 178 kg (391 lb 8 6 oz)     · Hold lasix given euvolemic state, contraction alkalemia; Continue negative fluid balance  · Daily weights  · Continue epoprostenol to offload right heart attempt to wean when oxygen requirements decrease   · Continue sildenafil

## 2022-12-07 NOTE — RESPIRATORY THERAPY NOTE
RT Protocol Note  Anup Vaughan 46 y o  male MRN: [id number]  Unit/Bed#:  Encounter: [id number]    Assessment    Principal Problem:    Acute respiratory failure due to COVID-19 [facility])  Active Problems:    CHF (congestive heart failure) (HCC)    Mixed hyperlipidemia    Morbid obesity (HCC)    Obstructive sleep apnea    Pulmonary hypertension (HCC)    Elevated serum creatinine      Home Pulmonary Medications:  None    Home Devices/Therapy: BiPAP/CPAP    Past Medical History:   Diagnosis Date    CHF (congestive heart failure) (HCC)     Hypertension     Sleep apnea      Social History     Socioeconomic History    Marital status: /Civil Union     Spouse name: None    Number of children: None    Years of education: None    Highest education level: None   Occupational History    None   Tobacco Use    Smoking status: Former     Types: Cigarettes    Smokeless tobacco: Never   Substance and Sexual Activity    Alcohol use: Not Currently    Drug use: Not Currently    Sexual activity: None   Other Topics Concern    None   Social History Narrative    None     Social Determinants of Health     Financial Resource Strain: Not on file   Food Insecurity: No Food Insecurity    Worried About Running Out of Food in the Last Year: Never true    Ran Out of Food in the Last Year: Never true   Transportation Needs: No Transportation Needs    Lack of Transportation (Medical): No    Lack of Transportation (Non-Medical):  No   Physical Activity: Not on file   Stress: Not on file   Social Connections: Not on file   Intimate Partner Violence: Not on file   Housing Stability: Unknown    Unable to Pay for Housing in the Last Year: No    Number of Places Lived in the Last Year: Not on file    Unstable Housing in the Last Year: No       Subjective    Subjective Data: awake and alert    Objective    Physical Exam:   Assessment Type: Pre-treatment  General Appearance: Alert, Awake  Respiratory Pattern: Normal  Chest Assessment: Chest expansion symmetrical  Bilateral Breath Sounds: Diminished  Cough: None  O2 Device: opti    Vitals:  Blood pressure 157/85, pulse (!) (P) 107, temperature 98 1 °F (36 7 °C), resp  rate (P) 16, height 5' 11" (1 803 m), weight (!) 178 kg (391 lb 8 6 oz), SpO2 94 %  Results from last 7 days   Lab Units 12/07/22  0436 12/06/22  1832 12/06/22  1244   PH ART  7 358   < > 7 348*   PCO2 ART mm Hg 70 9*   < > 76 2*   PO2 ART mm Hg 67 7*   < > 95 2   HCO3 ART mmol/L 39 0*   < > 40 9*   BASE EXC ART mmol/L 10 2   < > 11 6   O2 CONTENT ART mL/dL 19 4   < > 19 9   O2 HGB, ARTERIAL % 91 9*   < > 96 1   ABG SOURCE  Line, Arterial   < > Line, Arterial   HANNAH TEST   --   --  Yes    < > = values in this interval not displayed  Imaging and other studies: I have personally reviewed pertinent reports  O2 Device: opti     Plan    Respiratory Plan: Mild Distress pathway        Resp Comments: Pt remains on HFNC at this time  No changes made  Will wean as able  Tx given

## 2022-12-07 NOTE — PROGRESS NOTES
[address] [facility]  Progress Note Ollen Closs 1970, 46 y o  male MRN: [id number]  Unit/Bed#:  Encounter: [id number]  Primary Care Provider: No primary care provider on file  Date and time admitted to hospital: 12/1/2022  5:52 PM    Elevated serum creatinine  Assessment & Plan  · Now back at baseline  · Continue to monitor I&O and renal indices  · Holding diuresis as patient appears euvolemic    Pulmonary hypertension (HCC)  Assessment & Plan  Continue sildenafil and epoprostenol    Obstructive sleep apnea  Assessment & Plan  BiPAP at [facility]  Patient 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  Would benefit from sleep study as outpatient  Morbid obesity ([facility] [address] )  Assessment & Plan  Nutrition consulted  Encourage lifestyle modifications    Mixed hyperlipidemia  Assessment & Plan  Continue home statin    CHF (congestive heart failure) ([facility])  Assessment & Plan  Wt Readings from Last 3 Encounters:   12/04/22 (!) 178 kg (391 lb 8 6 oz)     · Hold lasix given euvolemic state, contraction alkalemia; Continue negative fluid balance  · Daily weights  · Continue epoprostenol to offload right heart attempt to wean when oxygen requirements decrease   · Continue sildenafil      * Acute respiratory failure due to COVID-19 [facility])  Assessment & Plan  · Infectious markers at presentation:  · CRP 20 0  · Now down to < 3  · Trend Q2 days   Daily if worsening  · D-Dimer 3 50  · Started on heparin gtt for AFib  · PE study on arrival negative  · LE Duplex negative  · D-Dimer now 0 86  · Oxygen: HFNC 80% 60L  · Pathway: Very severe  · Steroids: Decadron 0 1 mg/kg IV Q12 Day 5/10  · Can stop if clinical improvement earlier  · Baricitinib: Ordered for day 5 but only has received 3 doses given midnight timing and patient on BiPAP  · Changed timing to QPM  · If on RA for 24 hours can D/C  · Antibiotics  · None  · Procal <0 05  · Anticoagulation  · Hep gtt for AFib  · Remdesivir: Completed  · Discussed importance of self proning  · OOB TID, early mobilization           ----------------------------------------------------------------------------------------  HPI/24hr events: incremental improvement in oxygen requirements yesterday  Remains on max dose epoprostenol  BIPAP overnight  No acute events overnight      Patient appropriate for transfer out of the ICU today?: No  Disposition: Continue Stepdown Level 1 level of care   Code Status: Level 1 - Full Code  ---------------------------------------------------------------------------------------  SUBJECTIVE  "can I take this off?"    Review of Systems  Review of systems was reviewed and negative unless stated above in HPI/24-hour events   ---------------------------------------------------------------------------------------  OBJECTIVE    Vitals   Vitals:    22 2100 22 2200 22 2300 22 0000   BP:       BP Location:       Pulse: (!) 113 (!) 108 103 99   Resp: (!) 37 (!) 33 12 18   Temp:   98 2 °F (36 8 °C) 98 2 °F (36 8 °C)   TempSrc:       SpO2: 94% (!) 89% 95% 94%   Weight:       Height:         Temp (24hrs), Av 9 °F (36 6 °C), Min:97 5 °F (36 4 °C), Max:98 3 °F (36 8 °C)  Current: Temperature: 98 2 °F (36 8 °C)  Arterial Line BP: 158/97  Arterial Line MAP (mmHg): 114 mmHg    Respiratory:  SpO2: SpO2: 94 %  Nasal Cannula O2 Flow Rate (L/min): 55 L/min (HF)    Invasive/non-invasive ventilation settings   Respiratory    Lab Data (Last 4 hours)       0009            pH, Arterial       7 341             pCO2, Arterial       71 3             pO2, Arterial       80 8             HCO3, Arterial       37 7             Base Excess, Arterial       8 8                  O2/Vent Data       2238   Most Recent        Non-Invasive Ventilation Mode BiPAP  BiPAP                  Physical Exam  Constitutional:       General: He is awake  Appearance: He is morbidly obese  Comments:  On bipap   HENT:      Head: Normocephalic and atraumatic  Eyes:      General: Lids are normal       Extraocular Movements: Extraocular movements intact  Conjunctiva/sclera: Conjunctivae normal    Cardiovascular:      Rate and Rhythm: Tachycardia present  Rhythm regularly irregular  Pulses: Normal pulses  Pulmonary:      Effort: Tachypnea present  Breath sounds: Decreased breath sounds present  Abdominal:      General: There is distension  Palpations: Abdomen is soft  Tenderness: There is no abdominal tenderness  Musculoskeletal:      Right lower leg: No edema  Left lower leg: No edema  Skin:     General: Skin is warm and dry  Neurological:      General: No focal deficit present  Mental Status: He is alert  GCS: GCS eye subscore is 4  GCS verbal subscore is 5  GCS motor subscore is 6  Sensory: Sensation is intact  Motor: Motor function is intact  Laboratory and Diagnostics:  Results from last 7 days   Lab Units 12/06/22  0436 12/05/22  1109 12/05/22  0440 12/04/22  0556 12/03/22  0458 12/03/22  0218 12/03/22  0211 12/02/22  2155 12/02/22  0448 12/01/22  1822   WBC Thousand/uL 7 41 10 41* 9 95 9 53 10 38*  --   --   --  10 64* 9 50   HEMOGLOBIN g/dL 13 5 14 0 13 5 13 8 13 6  --   --   --  14 4 15 2   I STAT HEMOGLOBIN g/dl  --   --   --   --   --  15 6 15 6   < >  --   --    HEMATOCRIT % 47 6 49 3 48 4 49 1 49 3  --   --   --  51 5* 52 3*   HEMATOCRIT, ISTAT %  --   --   --   --   --  46 46   < >  --   --    PLATELETS Thousands/uL 179 219 206 229 232  --   --   --  232 269   NEUTROS PCT % 90*  --  91* 89* 90*  --   --   --   --  65   MONOS PCT % 4  --  4 4 2*  --   --   --   --  12    < > = values in this interval not displayed       Results from last 7 days   Lab Units 12/06/22  0436 12/06/22  0015 12/05/22  1800 12/05/22  1109 12/05/22  0440 12/05/22  0057 12/04/22  1733 12/04/22  1158 12/04/22  0556 12/03/22  1054 12/03/22  0458 12/02/22  0448 12/01/22  1822   SODIUM mmol/L 143 144 150* 150* 148* 150* 150*   < > 149*   < > 147   < > 145   POTASSIUM mmol/L 4 1 4 1 4 0 4 2 4 3 4 2 4 5   < > 4 7   < > 5 5*   < > 4 5   CHLORIDE mmol/L 103 102 103 104 105 104 105   < > 103   < > 103   < > 105   CO2 mmol/L 38* 40* 40* 39* 34* 40* 39*   < > 40*   < > 37*   < > 33*   CO2, I-STAT   --   --   --   --   --   --   --   --   --   --   --    < >  --    ANION GAP mmol/L 2* 2* 7 7 9 6 6   < > 6   < > 7   < > 7   BUN mg/dL 35* 37* 39* 41* 43* 44* 42*   < > 39*   < > 32*   < > 37*   CREATININE mg/dL 0 93 0 95 1 06 1 15 1 18 1 24 1 25   < > 1 32*   < > 1 31*   < > 1 39*   CALCIUM mg/dL 9 0 8 7 9 1 9 2 9 1 9 0 9 1   < > 9 1   < > 8 4   < > 8 7   GLUCOSE RANDOM mg/dL 162* 143* 174* 150* 153* 143* 154*   < > 164*   < > 146*   < > 113   ALT U/L  --   --   --   --  27  --   --   --  32  --  28  --  36   AST U/L  --   --   --   --  25  --   --   --  36  --  22  --  24   ALK PHOS U/L  --   --   --   --  45*  --   --   --  49  --  54  --  68   ALBUMIN g/dL  --   --   --   --  3 1*  --   --   --  3 2*  --  3 0*  --  3 2*   TOTAL BILIRUBIN mg/dL  --   --   --   --  0 70  --   --   --  0 52  --  0 50  --  0 57    < > = values in this interval not displayed  Results from last 7 days   Lab Units 12/06/22  0436 12/05/22  0440 12/04/22  0556 12/03/22  0458 12/01/22  1822   MAGNESIUM mg/dL 2 5 2 3 2 2 2 0 2 2   PHOSPHORUS mg/dL 3 9 4 2 4 6* 4 2  --       Results from last 7 days   Lab Units 12/06/22  2232 12/06/22  1637 12/06/22  0841 12/06/22  0105 12/05/22  1800 12/05/22  1109 12/03/22  0458 12/02/22  0532 12/01/22 2238   INR   --   --   --   --   --  1 23*  --   --  1 18   PTT seconds 76* 85* 42* 52* 32 26 62*   < > 22*    < > = values in this interval not displayed                ABG:  Results from last 7 days   Lab Units 12/07/22  0009   PH ART  7 341*   PCO2 ART mm Hg 71 3*   PO2 ART mm Hg 80 8   HCO3 ART mmol/L 37 7*   BASE EXC ART mmol/L 8 8   ABG SOURCE  Line, Arterial     VBG:  Results from last 7 days Lab Units 12/07/22  0009 12/03/22  1435 12/03/22  1054   PH HERNESTO   --   --  7 250*   PCO2 HERNESTO mm Hg  --   --  98 1*   PO2 HERNESTO mm Hg  --   --  63 7*   HCO3 HERNESTO mmol/L  --   --  42 1*   BASE EXC HERNESTO mmol/L  --   --  10 2   ABG SOURCE  Line, Arterial   < >  --     < > = values in this interval not displayed  Results from last 7 days   Lab Units 12/06/22  0436 12/03/22  0458 12/02/22  1256   PROCALCITONIN ng/ml <0 05 0 07 0 11       Micro        EKG: atrial fibrillation   Imaging:   XR chest portable ICU   Final Result      Compromised by body habitus and motion with no definite acute disease  Cardiomegaly  Workstation performed: [id number]         CTA ED chest PE Study   Final Result         1  Mild to moderate cardiomegaly with small right pleural effusion  2  Enlarged pulmonary artery suggesting pulmonary hypertension  3  Subsegmental atelectasis versus pneumonia right lower lobe  4  No evidence of pulmonary embolism given limitations  5  Small perihepatic ascites  Workstation performed: [id number]         XR chest 1 view portable   Final Result      Cardiomegaly   Hazy density in the lungs with increased lung markings suggest congestion  Small left effusion                  Workstation performed: [id number]         VAS lower limb venous duplex study, complete bilateral    (Results Pending)       Intake and Output  I/O       12/05 0701 12/06 0700 12/06 0701 12/07 0700    P  O  120 950    I V  (mL/kg) 579 6 (3 3) 312 7 (1 8)    IV Piggyback 250     Total Intake(mL/kg) 949 6 (5 3) 1262 7 (7 1)    Urine (mL/kg/hr) 3700 (0 9) 1360 (0 3)    Total Output 3700 1360    Net -2750 4 -97 3                Height and Weights   Height: 5' 11" (180 3 cm)  IBW (Ideal Body Weight): 75 3 kg  Body mass index is 54 61 kg/m²    Weight (last 2 days)     None            Nutrition       Diet Orders   (From admission, onward)             Start     Ordered    12/06/22 1103  Diet Cardiovascular; Cardiac; Fluid Restriction 1500 ML, Consistent Carbohydrate Diet Level 2 (5 carb servings/75 grams CHO/meal)  Diet effective now        References:    Nutrtion Support Algorithm Enteral vs  Parenteral   Question Answer Comment   Diet Type Cardiovascular    Cardiac Cardiac    Other Restriction(s): Fluid Restriction 1500 ML    Other Restriction(s): Consistent Carbohydrate Diet Level 2 (5 carb servings/75 grams CHO/meal)    RD to adjust diet per protocol?  Yes        12/06/22 1105                  Active Medications  Scheduled Meds:  Current Facility-Administered Medications   Medication Dose Route Frequency Provider Last Rate   • acetaminophen  650 mg Oral Q6H PRN ELIZABETH Frye     • albuterol  2 puff Inhalation Q4H PRN ELIZABETH Dietz     • atorvastatin  10 mg Oral Daily With [employer] ELIZABETH Meléndez     • baricitinib  4 mg Oral Q24H Jay Murphy PA-C     • dexamethasone  0 1 mg/kg Intravenous Q12H ELIZABETH Frye 18 4 mg (12/06/22 1943)   • dextromethorphan-guaiFENesin  10 mL Oral Q4H PRN ELIZABETH Frye     • epoprostenol  6 25-50 ng/kg/min (Ideal) Inhalation Titrated Ana María Wade DO 49 801 ng/kg/min (12/06/22 2231)   • heparin (porcine)  3-20 Units/kg/hr (Order-Specific) Intravenous Titrated RICK Arias-C 20 Units/kg/hr (12/06/22 2235)   • heparin (porcine)  2,000 Units Intravenous Q1H PRN RICK Arias-C     • heparin (porcine)  4,000 Units Intravenous Q1H PRN Jay Murphy PA-C     • levalbuterol  1 25 mg Nebulization TID ELIZABETH Griffin     • metoprolol tartrate  50 mg Oral Q12H [facility] ELIZABETH Estrada     • sildenafil  20 mg Oral TID ELIZABETH Dietz     • sodium chloride  3 mL Nebulization TID ELIZABETH Griffin       Continuous Infusions:  epoprostenol, 6 25-50 ng/kg/min (Ideal), Last Rate: 49 801 ng/kg/min (12/06/22 2231)  heparin (porcine), 3-20 Units/kg/hr (Order-Specific), Last Rate: 20 Units/kg/hr (12/06/22 2235)      PRN Meds:   acetaminophen, 650 mg, Q6H PRN  albuterol, 2 puff, Q4H PRN  dextromethorphan-guaiFENesin, 10 mL, Q4H PRN  heparin (porcine), 2,000 Units, Q1H PRN  heparin (porcine), 4,000 Units, Q1H PRN        Invasive Devices Review  Invasive Devices     Peripheral Intravenous Line  Duration           Peripheral IV Distal;Left;Upper Forearm -- days    Peripheral IV 12/03/22 Left;Ventral (anterior) Forearm 3 days          Arterial Line  Duration           Arterial Line 12/03/22 Radial 3 days          Drain  Duration           Urethral Catheter 4 days                Rationale for remaining devices: NA   ---------------------------------------------------------------------------------------  Advance Directive and Living Will:      Power of :    POLST:    ---------------------------------------------------------------------------------------  Care Time Delivered:   Upon my evaluation, this patient had a high probability of imminent or life-threatening deterioration due to acute hypoxic respiratory failure secondary to COVID 19, which required my direct attention, intervention, and personal management  I have personally provided 30 minutes (0113 to 0143) of critical care time, exclusive of procedures, teaching, family meetings, and any prior time recorded by providers other than myself  ELIZABETH Macias      Portions of the record may have been created with voice recognition software  Occasional wrong word or "sound a like" substitutions may have occurred due to the inherent limitations of voice recognition software    Read the chart carefully and recognize, using context, where substitutions have occurred

## 2022-12-07 NOTE — RESPIRATORY THERAPY NOTE
12/07/22 0146   Respiratory Assessment   Resp Comments pt requesting to be taken off bipap, placed back on HF   O2 Device opti flow   Non-Invasive Information   O2 Interface Device HFNC prongs   Non-Invasive Ventilation Mode HFNC (High flow)   $ Intermittent NIV Yes   SpO2 94 %   $ Pulse Oximetry Spot Check Charge Completed   Non-Invasive Settings   FiO2 (%) 65  (HF)   Flow (lpm) 55  (HF)   Humidification   (heater)   Temperature (Set) 31   Non-Invasive Readings   Skin Intervention Skin intact   Heater Temperature (Obs) 30 9

## 2022-12-07 NOTE — ASSESSMENT & PLAN NOTE
[place]ma at [facility]  Patient 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  Would benefit from sleep study as outpatient

## 2022-12-07 NOTE — RESPIRATORY THERAPY NOTE
12/06/22 7157   Respiratory Assessment   Resp Comments pt removed from HF and placed on NIV for HS use, RN at bedside   O2 Device v60 Eleven   Non-Invasive Information   O2 Interface Device Face mask   Non-Invasive Ventilation Mode BiPAP   Non-Invasive Settings   IPAP (cm) 25 cm   EPAP (cm) 15 cm   Rate (Set) 10   FiO2 (%) 80   Rise Time 2   Inspiratory Time (Set) 0 8   Humidification   (heater)   Temperature (Set) 31   Non-Invasive Readings   Skin Intervention Skin intact   Total Rate 14   Vt (mL) (Mech) 1224   MV (Mech) 17 4   Peak Pressure (Obs) 25   Heater Temperature (Obs)   (unit warming)   Leak (lpm) 9   Non-Invasive Alarms   Insp Pressure High (cm H20) 35   Insp Pressure Low (cm H20) 8   Low Insp Pressure Time (sec) 20 sec   MV Low (L/min) 3   Vt High (mL) 1600   Vt Low (mL) 300   High Resp Rate (BPM) 35 BPM   Low Resp Rate (BPM) 8 BPM   Apnea Interval (sec) 20   Maintenance   Water bag changed Yes

## 2022-12-08 LAB
ALBUMIN SERPL BCP-MCNC: 2.9 G/DL (ref 3.5–5)
ALP SERPL-CCNC: 39 U/L (ref 46–116)
ALT SERPL W P-5'-P-CCNC: 30 U/L (ref 12–78)
ANION GAP SERPL CALCULATED.3IONS-SCNC: 4 MMOL/L (ref 4–13)
APTT PPP: 67 SECONDS (ref 23–37)
AST SERPL W P-5'-P-CCNC: 10 U/L (ref 5–45)
BASE EXCESS BLDA CALC-SCNC: 10.1 MMOL/L
BASE EXCESS BLDA CALC-SCNC: 10.5 MMOL/L
BASE EXCESS BLDA CALC-SCNC: 7.5 MMOL/L
BASOPHILS # BLD AUTO: 0 THOUSANDS/ÂΜL (ref 0–0.1)
BASOPHILS NFR BLD AUTO: 0 % (ref 0–1)
BILIRUB SERPL-MCNC: 1.06 MG/DL (ref 0.2–1)
BUN SERPL-MCNC: 34 MG/DL (ref 5–25)
CA-I BLD-SCNC: 1.21 MMOL/L (ref 1.12–1.32)
CALCIUM ALBUM COR SERPL-MCNC: 9.7 MG/DL (ref 8.3–10.1)
CALCIUM SERPL-MCNC: 8.8 MG/DL (ref 8.3–10.1)
CHLORIDE SERPL-SCNC: 103 MMOL/L (ref 96–108)
CO2 SERPL-SCNC: 36 MMOL/L (ref 21–32)
CREAT SERPL-MCNC: 0.86 MG/DL (ref 0.6–1.3)
EOSINOPHIL # BLD AUTO: 0 THOUSAND/ÂΜL (ref 0–0.61)
EOSINOPHIL NFR BLD AUTO: 0 % (ref 0–6)
ERYTHROCYTE [DISTWIDTH] IN BLOOD BY AUTOMATED COUNT: 14.9 % (ref 11.6–15.1)
GFR SERPL CREATININE-BSD FRML MDRD: 99 ML/MIN/1.73SQ M
GLUCOSE SERPL-MCNC: 154 MG/DL (ref 65–140)
HCO3 BLDA-SCNC: 34.8 MMOL/L (ref 22–28)
HCO3 BLDA-SCNC: 37.6 MMOL/L (ref 22–28)
HCO3 BLDA-SCNC: 38.6 MMOL/L (ref 22–28)
HCT VFR BLD AUTO: 46.9 % (ref 36.5–49.3)
HGB BLD-MCNC: 13.6 G/DL (ref 12–17)
IMM GRANULOCYTES # BLD AUTO: 0.02 THOUSAND/UL (ref 0–0.2)
IMM GRANULOCYTES NFR BLD AUTO: 0 % (ref 0–2)
IPAP: 25
IPAP: 25
LYMPHOCYTES # BLD AUTO: 0.4 THOUSANDS/ÂΜL (ref 0.6–4.47)
LYMPHOCYTES NFR BLD AUTO: 7 % (ref 14–44)
MAGNESIUM SERPL-MCNC: 2.2 MG/DL (ref 1.6–2.6)
MCH RBC QN AUTO: 24.6 PG (ref 26.8–34.3)
MCHC RBC AUTO-ENTMCNC: 29 G/DL (ref 31.4–37.4)
MCV RBC AUTO: 85 FL (ref 82–98)
MONOCYTES # BLD AUTO: 0.32 THOUSAND/ÂΜL (ref 0.17–1.22)
MONOCYTES NFR BLD AUTO: 5 % (ref 4–12)
NEUTROPHILS # BLD AUTO: 5.38 THOUSANDS/ÂΜL (ref 1.85–7.62)
NEUTS SEG NFR BLD AUTO: 88 % (ref 43–75)
NON VENT- BIPAP: ABNORMAL
NON VENT- BIPAP: ABNORMAL
NRBC BLD AUTO-RTO: 0 /100 WBCS
O2 CT BLDA-SCNC: 19.3 ML/DL (ref 16–23)
O2 CT BLDA-SCNC: 19.5 ML/DL (ref 16–23)
O2 CT BLDA-SCNC: 19.6 ML/DL (ref 16–23)
OXYHGB MFR BLDA: 91.7 % (ref 94–97)
OXYHGB MFR BLDA: 94 % (ref 94–97)
OXYHGB MFR BLDA: 95.2 % (ref 94–97)
PCO2 BLDA: 60.5 MM HG (ref 36–44)
PCO2 BLDA: 63 MM HG (ref 36–44)
PCO2 BLDA: 66.1 MM HG (ref 36–44)
PEEP MAX SETTING VENT: 15 CM[H2O]
PEEP MAX SETTING VENT: 15 CM[H2O]
PH BLDA: 7.38 [PH] (ref 7.35–7.45)
PH BLDA: 7.38 [PH] (ref 7.35–7.45)
PH BLDA: 7.39 [PH] (ref 7.35–7.45)
PHOSPHATE SERPL-MCNC: 3.7 MG/DL (ref 2.7–4.5)
PLATELET # BLD AUTO: 150 THOUSANDS/UL (ref 149–390)
PMV BLD AUTO: 10.1 FL (ref 8.9–12.7)
PO2 BLDA: 66.5 MM HG (ref 75–129)
PO2 BLDA: 77.7 MM HG (ref 75–129)
PO2 BLDA: 90.9 MM HG (ref 75–129)
POTASSIUM SERPL-SCNC: 4.6 MMOL/L (ref 3.5–5.3)
PROT SERPL-MCNC: 5.9 G/DL (ref 6.4–8.4)
RBC # BLD AUTO: 5.53 MILLION/UL (ref 3.88–5.62)
SODIUM SERPL-SCNC: 143 MMOL/L (ref 135–147)
SPECIMEN SOURCE: ABNORMAL
VENT BIPAP FIO2: 80 %
VENT BIPAP FIO2: 80 %
WBC # BLD AUTO: 6.12 THOUSAND/UL (ref 4.31–10.16)

## 2022-12-08 RX ADMIN — METOPROLOL TARTRATE 50 MG: 50 TABLET, FILM COATED ORAL at 08:57

## 2022-12-08 RX ADMIN — SILDENAFIL 20 MG: 20 TABLET ORAL at 08:57

## 2022-12-08 RX ADMIN — LEVALBUTEROL HYDROCHLORIDE 1.25 MG: 1.25 SOLUTION, CONCENTRATE RESPIRATORY (INHALATION) at 19:43

## 2022-12-08 RX ADMIN — DEXAMETHASONE SODIUM PHOSPHATE 18.4 MG: 10 INJECTION, SOLUTION INTRAMUSCULAR; INTRAVENOUS at 08:56

## 2022-12-08 RX ADMIN — ISODIUM CHLORIDE 3 ML: 0.03 SOLUTION RESPIRATORY (INHALATION) at 08:15

## 2022-12-08 RX ADMIN — METOPROLOL TARTRATE 50 MG: 50 TABLET, FILM COATED ORAL at 20:38

## 2022-12-08 RX ADMIN — LEVALBUTEROL HYDROCHLORIDE 1.25 MG: 1.25 SOLUTION, CONCENTRATE RESPIRATORY (INHALATION) at 08:14

## 2022-12-08 RX ADMIN — ATORVASTATIN CALCIUM 10 MG: 10 TABLET, FILM COATED ORAL at 16:16

## 2022-12-08 RX ADMIN — BARICITINIB 4 MG: 2 TABLET, FILM COATED ORAL at 08:57

## 2022-12-08 RX ADMIN — ISODIUM CHLORIDE 3 ML: 0.03 SOLUTION RESPIRATORY (INHALATION) at 19:43

## 2022-12-08 RX ADMIN — HEPARIN SODIUM 20 UNITS/KG/HR: 10000 INJECTION, SOLUTION INTRAVENOUS at 20:38

## 2022-12-08 RX ADMIN — HEPARIN SODIUM 20 UNITS/KG/HR: 10000 INJECTION, SOLUTION INTRAVENOUS at 08:56

## 2022-12-08 RX ADMIN — SILDENAFIL 20 MG: 20 TABLET ORAL at 16:16

## 2022-12-08 RX ADMIN — LEVALBUTEROL HYDROCHLORIDE 1.25 MG: 1.25 SOLUTION, CONCENTRATE RESPIRATORY (INHALATION) at 14:12

## 2022-12-08 RX ADMIN — SILDENAFIL 20 MG: 20 TABLET ORAL at 20:38

## 2022-12-08 RX ADMIN — DEXAMETHASONE SODIUM PHOSPHATE 18.4 MG: 10 INJECTION, SOLUTION INTRAMUSCULAR; INTRAVENOUS at 19:12

## 2022-12-08 RX ADMIN — ISODIUM CHLORIDE 3 ML: 0.03 SOLUTION RESPIRATORY (INHALATION) at 14:12

## 2022-12-08 NOTE — RESPIRATORY THERAPY NOTE
12/07/22 1958   Respiratory Assessment   Resp Comments pt remains on HFNC at this time no changes were made to the HFNC settings   O2 Device optiflow   Non-Invasive Information   O2 Interface Device HFNC prongs   Non-Invasive Ventilation Mode HFNC (High flow)   $ Pulse Oximetry Spot Check Charge Completed   Non-Invasive Settings   FiO2 (%) 65   Flow (lpm) 55   Maintenance   Water bag changed Yes

## 2022-12-08 NOTE — ASSESSMENT & PLAN NOTE
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

## 2022-12-08 NOTE — PROGRESS NOTES
[facility]  Progress Note Bob Tao 1970, 46 y o  male MRN: [id number]  Unit/Bed#:  Encounter: [id number]  Primary Care Provider: No primary care provider on file  Date and time admitted to hospital: 12/1/2022  5:52 PM    Elevated serum creatinine  Assessment & Plan  · Now back at baseline  · Continue to monitor I&O and renal indices  · Holding diuresis as patient appears euvolemic    Pulmonary hypertension (HCC)  Assessment & Plan  Continue sildenafil   Epoprostenol weaned to off overnight     Obstructive sleep apnea  Assessment & Plan  BiPAP at [facility]  Patient 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  Would benefit from sleep study as outpatient  Morbid obesity ([address] )  Assessment & Plan  Nutrition consulted  Encourage lifestyle modifications    Mixed hyperlipidemia  Assessment & Plan  Continue home statin    CHF (congestive heart failure) ([facility])  Assessment & Plan  Wt Readings from Last 3 Encounters:   12/04/22 (!) 178 kg (391 lb 8 6 oz)     · Hold lasix given euvolemic state, contraction alkalemia; Continue negative fluid balance  · Daily weights  · Epoprostenol weaned to off via minimization protocol, monitor closely for rebound hypoxia,  Continue to wean  oxygen   · Continue sildenafil      * Acute respiratory failure due to COVID-19 [facility])  Assessment & Plan  · Infectious markers at presentation:  · CRP 20 0  · Now down to < 3  · Trend Q2 days   Daily if worsening  · D-Dimer 3 50  · Started on heparin gtt for AFib  · PE study on arrival negative  · LE Duplex negative  · D-Dimer now 0 86  · Oxygen: HFNC 80% 60L  · Pathway: Very severe  · Steroids: Decadron 0 1 mg/kg IV Q12 Day 5/10  · Can stop if clinical improvement earlier  · Baricitinib: Ordered for day 5 but only has received 3 doses given midnight timing and patient on BiPAP  · Changed timing to QPM  · If on RA for 24 hours can D/C  · Antibiotics  · None  · Procal <0 05  · Anticoagulation  · Hep gtt for AFib  · Remdesivir: Completed  · Discussed importance of self proning  · OOB TID, early mobilization           ----------------------------------------------------------------------------------------  HPI/24hr events: epoprostenol weaned to off overnight  Intermittent rapid afib while oob  Repeat dose of amiodarone given  Improved at rest    Patient appropriate for transfer out of the ICU today?: No  Disposition: Continue Stepdown Level 1 level of care   Code Status: Level 1 - Full Code  ---------------------------------------------------------------------------------------  SUBJECTIVE  Offers no complaints     Review of Systems  Review of systems was reviewed and negative unless stated above in HPI/24-hour events   ---------------------------------------------------------------------------------------  OBJECTIVE    Vitals   Vitals:    22 2100 22 2200 22 2300 22 0000   BP: 126/68 131/75 144/88 141/89   BP Location:   Right arm    Pulse: 83 80 81 81   Resp: (!) 29 20 18 19   Temp: 98 6 °F (37 °C) 98 4 °F (36 9 °C) 98 3 °F (36 8 °C) 98 2 °F (36 8 °C)   TempSrc:   Axillary    SpO2: 92% 92% 92% 92%   Weight:       Height:         Temp (24hrs), Av 2 °F (36 8 °C), Min:98 1 °F (36 7 °C), Max:98 6 °F (37 °C)  Current: Temperature: 98 2 °F (36 8 °C)  Arterial Line BP: 149/95  Arterial Line MAP (mmHg): 110 mmHg    Respiratory:  SpO2: SpO2: 92 %  Nasal Cannula O2 Flow Rate (L/min): 55 L/min (HF)    Invasive/non-invasive ventilation settings   Respiratory    Lab Data (Last 4 hours)       0024            pH, Arterial       7 394             pCO2, Arterial       63 0             pO2, Arterial       77 7             HCO3, Arterial       37 6             Base Excess, Arterial       10 1                  O2/Vent Data           Most Recent        Non-Invasive Ventilation Mode   BiPAP                  Physical Exam  Constitutional:       General: He is awake  Appearance: He is morbidly obese  He is ill-appearing  Comments: High flow nasal cannula    HENT:      Head: Normocephalic and atraumatic  Eyes:      General: Lids are normal       Extraocular Movements: Extraocular movements intact  Conjunctiva/sclera: Conjunctivae normal    Cardiovascular:      Rate and Rhythm: Tachycardia present  Rhythm regularly irregular  Pulmonary:      Breath sounds: Decreased breath sounds present  Abdominal:      Palpations: Abdomen is soft  Tenderness: There is no abdominal tenderness  Skin:     General: Skin is warm and dry  Neurological:      General: No focal deficit present  Mental Status: He is alert  GCS: GCS eye subscore is 4  GCS verbal subscore is 5  GCS motor subscore is 6  Laboratory and Diagnostics:  Results from last 7 days   Lab Units 12/06/22  0436 12/05/22  1109 12/05/22  0440 12/04/22  0556 12/03/22  0458 12/03/22  0218 12/03/22  0211 12/02/22  2155 12/02/22  0448 12/01/22  1822   WBC Thousand/uL 7 41 10 41* 9 95 9 53 10 38*  --   --   --  10 64* 9 50   HEMOGLOBIN g/dL 13 5 14 0 13 5 13 8 13 6  --   --   --  14 4 15 2   I STAT HEMOGLOBIN g/dl  --   --   --   --   --  15 6 15 6   < >  --   --    HEMATOCRIT % 47 6 49 3 48 4 49 1 49 3  --   --   --  51 5* 52 3*   HEMATOCRIT, ISTAT %  --   --   --   --   --  46 46   < >  --   --    PLATELETS Thousands/uL 179 219 206 229 232  --   --   --  232 269   NEUTROS PCT % 90*  --  91* 89* 90*  --   --   --   --  65   MONOS PCT % 4  --  4 4 2*  --   --   --   --  12    < > = values in this interval not displayed       Results from last 7 days   Lab Units 12/07/22  0434 12/06/22  0436 12/06/22  0015 12/05/22  1800 12/05/22  1109 12/05/22  0440 12/05/22  0057 12/04/22  1158 12/04/22  0556 12/03/22  1054 12/03/22  0458 12/02/22  0448 12/01/22  1822   SODIUM mmol/L 144 143 144 150* 150* 148* 150*   < > 149*   < > 147   < > 145   POTASSIUM mmol/L 4 4 4 1 4 1 4 0 4 2 4 3 4 2   < > 4 7   < > 5 5* < > 4 5   CHLORIDE mmol/L 103 103 102 103 104 105 104   < > 103   < > 103   < > 105   CO2 mmol/L 37* 38* 40* 40* 39* 34* 40*   < > 40*   < > 37*   < > 33*   CO2, I-STAT   --   --   --   --   --   --   --   --   --   --   --    < >  --    ANION GAP mmol/L 4 2* 2* 7 7 9 6   < > 6   < > 7   < > 7   BUN mg/dL 35* 35* 37* 39* 41* 43* 44*   < > 39*   < > 32*   < > 37*   CREATININE mg/dL 0 90 0 93 0 95 1 06 1 15 1 18 1 24   < > 1 32*   < > 1 31*   < > 1 39*   CALCIUM mg/dL 8 9 9 0 8 7 9 1 9 2 9 1 9 0   < > 9 1   < > 8 4   < > 8 7   GLUCOSE RANDOM mg/dL 156* 162* 143* 174* 150* 153* 143*   < > 164*   < > 146*   < > 113   ALT U/L 32  --   --   --   --  27  --   --  32  --  28  --  36   AST U/L 16  --   --   --   --  25  --   --  36  --  22  --  24   ALK PHOS U/L 44*  --   --   --   --  45*  --   --  49  --  54  --  68   ALBUMIN g/dL 3 0*  --   --   --   --  3 1*  --   --  3 2*  --  3 0*  --  3 2*   TOTAL BILIRUBIN mg/dL 1 01*  --   --   --   --  0 70  --   --  0 52  --  0 50  --  0 57    < > = values in this interval not displayed  Results from last 7 days   Lab Units 12/07/22  0434 12/06/22  0436 12/05/22  0440 12/04/22  0556 12/03/22  0458 12/01/22  1822   MAGNESIUM mg/dL 2 1 2 5 2 3 2 2 2 0 2 2   PHOSPHORUS mg/dL 3 9 3 9 4 2 4 6* 4 2  --       Results from last 7 days   Lab Units 12/07/22  0434 12/06/22  2232 12/06/22  1637 12/06/22  0841 12/06/22  0105 12/05/22  1800 12/05/22  1109 12/02/22  0532 12/01/22  2238   INR   --   --   --   --   --   --  1 23*  --  1 18   PTT seconds 75* 76* 85* 42* 52* 32 26   < > 22*    < > = values in this interval not displayed                ABG:  Results from last 7 days   Lab Units 12/08/22  0024   PH ART  7 394   PCO2 ART mm Hg 63 0*   PO2 ART mm Hg 77 7   HCO3 ART mmol/L 37 6*   BASE EXC ART mmol/L 10 1   ABG SOURCE  Line, Arterial     VBG:  Results from last 7 days   Lab Units 12/08/22  0024 12/03/22  1435 12/03/22  1054   PH HERNESTO   --   --  7 250*   PCO2 HERNESTO mm Hg  --   --  98 1* PO2 HERNESTO mm Hg  --   --  63 7*   HCO3 HERNESTO mmol/L  --   --  42 1*   BASE EXC HERNESTO mmol/L  --   --  10 2   ABG SOURCE  Line, Arterial   < >  --     < > = values in this interval not displayed  Results from last 7 days   Lab Units 12/06/22  0436 12/03/22  0458 12/02/22  1256   PROCALCITONIN ng/ml <0 05 0 07 0 11       Micro        EKG: atrial fibrillation   Imaging:   XR chest portable ICU   Final Result      Compromised by body habitus and motion with no definite acute disease  Cardiomegaly  Workstation performed: [id number]         CTA ED chest PE Study   Final Result         1  Mild to moderate cardiomegaly with small right pleural effusion  2  Enlarged pulmonary artery suggesting pulmonary hypertension  3  Subsegmental atelectasis versus pneumonia right lower lobe  4  No evidence of pulmonary embolism given limitations  5  Small perihepatic ascites  Workstation performed: [id number]         XR chest 1 view portable   Final Result      Cardiomegaly   Hazy density in the lungs with increased lung markings suggest congestion  Small left effusion                  Workstation performed: [id number]         VAS lower limb venous duplex study, complete bilateral    (Results Pending)       Intake and Output  I/O       12/06 0701  12/07 0700 12/07 0701  12/08 0700    P  O  950 240    I V  (mL/kg) 420 7 (2 4) 374 (2 1)    IV Piggyback  200    Total Intake(mL/kg) 1370 7 (7 7) 814 (4 6)    Urine (mL/kg/hr) 1875 (0 4) 1160 (0 3)    Total Output 1875 1160    Net -504 3 -346                Height and Weights   Height: 5' 11" (180 3 cm)  IBW (Ideal Body Weight): 75 3 kg  Body mass index is 54 61 kg/m²    Weight (last 2 days)     None            Nutrition       Diet Orders   (From admission, onward)             Start     Ordered    12/06/22 1103  Diet Cardiovascular; Cardiac; Fluid Restriction 1500 ML, Consistent Carbohydrate Diet Level 2 (5 carb servings/75 grams CHO/meal)  Diet effective now        References:    Nutrtion Support Algorithm Enteral vs  Parenteral   Question Answer Comment   Diet Type Cardiovascular    Cardiac Cardiac    Other Restriction(s): Fluid Restriction 1500 ML    Other Restriction(s): Consistent Carbohydrate Diet Level 2 (5 carb servings/75 grams CHO/meal)    RD to adjust diet per protocol?  Yes        12/06/22 1105              Active Medications  Scheduled Meds:  Current Facility-Administered Medications   Medication Dose Route Frequency Provider Last Rate   • acetaminophen  650 mg Oral Q6H PRN ELIZABETH Calles     • albuterol  2 puff Inhalation Q4H PRN ELIZABETH Calles     • atorvastatin  10 mg Oral Daily With [employer] ELIZABETH Meléndez     • baricitinib  4 mg Oral Q24H Luis Jones PA-C     • dexamethasone  0 1 mg/kg Intravenous Q12H ELIZABETH Barrow 18 4 mg (12/07/22 1947)   • dextromethorphan-guaiFENesin  10 mL Oral Q4H PRN ELIZABETH Calles     • epoprostenol  6 25-50 ng/kg/min (Ideal) Inhalation Titrated Cresenciarenetta Wilkset, DO Stopped (12/07/22 2004)   • heparin (porcine)  3-20 Units/kg/hr (Order-Specific) Intravenous Titrated Luis Jones PA-C 20 Units/kg/hr (12/07/22 1737)   • heparin (porcine)  2,000 Units Intravenous Q1H PRN Luis Jones PA-C     • heparin (porcine)  4,000 Units Intravenous Q1H PRN Luis Jones PA-C     • levalbuterol  1 25 mg Nebulization TID ELIZABETH Charles     • metoprolol tartrate  50 mg Oral Q12H [facility] ELIZABETH Castanon     • sildenafil  20 mg Oral TID ELIZABETH Calles     • sodium chloride  3 mL Nebulization TID ELIZABETH Charles       Continuous Infusions:  epoprostenol, 6 25-50 ng/kg/min (Ideal), Last Rate: Stopped (12/07/22 2004)  heparin (porcine), 3-20 Units/kg/hr (Order-Specific), Last Rate: 20 Units/kg/hr (12/07/22 1737)      PRN Meds:   acetaminophen, 650 mg, Q6H PRN  albuterol, 2 puff, Q4H PRN  dextromethorphan-guaiFENesin, 10 mL, Q4H PRN  heparin (porcine), 2,000 Units, Q1H PRN  heparin (porcine), 4,000 Units, Q1H PRN      Invasive Devices Review  Invasive Devices     Peripheral Intravenous Line  Duration           Peripheral IV Distal;Left;Upper Forearm -- days    Peripheral IV 12/03/22 Left;Ventral (anterior) Forearm 4 days          Arterial Line  Duration           Arterial Line 12/03/22 Radial 4 days          Drain  Duration           Urethral Catheter 5 days                Rationale for remaining devices: NA   ---------------------------------------------------------------------------------------  Advance Directive and Living Will:      Power of :    POLST:    ---------------------------------------------------------------------------------------  Care Time Delivered:   Upon my evaluation, this patient had a high probability of imminent or life-threatening deterioration due to acute hypoxic respiratory failure, which required my direct attention, intervention, and personal management  I have personally provided 39 minutes (0030 to 0109) of critical care time, exclusive of procedures, teaching, family meetings, and any prior time recorded by providers other than myself  ELIZABETH Daniel      Portions of the record may have been created with voice recognition software  Occasional wrong word or "sound a like" substitutions may have occurred due to the inherent limitations of voice recognition software    Read the chart carefully and recognize, using context, where substitutions have occurred

## 2022-12-08 NOTE — RESPIRATORY THERAPY NOTE
RT Protocol Note  Charu Kirk 46 y o  male MRN: [id number]  Unit/Bed#:  Encounter: [id number]    Assessment    Principal Problem:    Acute respiratory failure due to COVID-19 [facility])  Active Problems:    CHF (congestive heart failure) (HCC)    Mixed hyperlipidemia    Morbid obesity (HCC)    Obstructive sleep apnea    Pulmonary hypertension (HCC)    Elevated serum creatinine    Sepsis ([address] ), POA      Home Pulmonary Medications:  None    Home Devices/Therapy: BiPAP/CPAP    Past Medical History:   Diagnosis Date    CHF (congestive heart failure) (HCC)     Hypertension     Sleep apnea      Social History     Socioeconomic History    Marital status: /Civil Union     Spouse name: None    Number of children: None    Years of education: None    Highest education level: None   Occupational History    None   Tobacco Use    Smoking status: Former     Types: Cigarettes    Smokeless tobacco: Never   Substance and Sexual Activity    Alcohol use: Not Currently    Drug use: Not Currently    Sexual activity: None   Other Topics Concern    None   Social History Narrative    None     Social Determinants of Health     Financial Resource Strain: Not on file   Food Insecurity: No Food Insecurity    Worried About Running Out of Food in the Last Year: Never true    Ran Out of Food in the Last Year: Never true   Transportation Needs: No Transportation Needs    Lack of Transportation (Medical): No    Lack of Transportation (Non-Medical):  No   Physical Activity: Not on file   Stress: Not on file   Social Connections: Not on file   Intimate Partner Violence: Not on file   Housing Stability: Unknown    Unable to Pay for Housing in the Last Year: No    Number of Places Lived in the Last Year: Not on file    Unstable Housing in the Last Year: No       Subjective    Subjective Data: awake and alert    Objective    Physical Exam:   Assessment Type: Pre-treatment  General Appearance: Sleeping  Respiratory Pattern: Normal  Chest Assessment: Chest expansion symmetrical  Bilateral Breath Sounds: Diminished  Cough: None  O2 Device: opti    Vitals:  Blood pressure 140/91, pulse 94, temperature 97 9 °F (36 6 °C), resp  rate 22, height 5' 11" (1 803 m), weight (!) 178 kg (391 lb 8 6 oz), SpO2 95 %  Results from last 7 days   Lab Units 12/08/22  0526 12/06/22  1832 12/06/22  1244   PH ART  7 378   < > 7 348*   PCO2 ART mm Hg 60 5*   < > 76 2*   PO2 ART mm Hg 90 9   < > 95 2   HCO3 ART mmol/L 34 8*   < > 40 9*   BASE EXC ART mmol/L 7 5   < > 11 6   O2 CONTENT ART mL/dL 19 6   < > 19 9   O2 HGB, ARTERIAL % 95 2   < > 96 1   ABG SOURCE  Line, Arterial   < > Line, Arterial   HANNAH TEST   --   --  Yes    < > = values in this interval not displayed  Imaging and other studies: I have personally reviewed pertinent reports  O2 Device: opti     Plan    Respiratory Plan: Mild Distress pathway        Resp Comments: Pt placed on HFNC at this time  Will wean as able

## 2022-12-08 NOTE — RESPIRATORY THERAPY NOTE
12/08/22 0837   Respiratory Assessment   Resp Comments Pt placed on HFNC at this time  Will wean as able     O2 Device opti   Non-Invasive Information   O2 Interface Device (S)  HFNC prongs   Non-Invasive Ventilation Mode HFNC (High flow)   SpO2 95 %   $ Pulse Oximetry Spot Check Charge Completed   Non-Invasive Settings   FiO2 (%) 65   Flow (lpm) 55   Humidification   (heater)   Temperature (Set) 31   Non-Invasive Readings   Skin Intervention Skin intact   Heater Temperature (Obs)   (warming)   Maintenance   Water bag changed No

## 2022-12-08 NOTE — ASSESSMENT & PLAN NOTE
Wt Readings from Last 3 Encounters:   12/04/22 (!) 178 kg (391 lb 8 6 oz)     · Hold lasix given euvolemic state, contraction alkalemia; Continue negative fluid balance  · Daily weights  · Epoprostenol weaned to off via minimization protocol, monitor closely for rebound hypoxia,  Continue to wean  oxygen   · Continue sildenafil

## 2022-12-08 NOTE — PHYSICAL THERAPY NOTE
PHYSICAL THERAPY EVALUATION  NAME:  Cholo Jerome  DATE: 12/08/22    AGE:   46 y o  Mrn:   [id number]  ADMIT DX:  SOB (shortness of breath) [R06 02]  Hypoxia [R09 02]  COVID [U07 1]  Problem List:   Patient Active Problem List   Diagnosis    CHF (congestive heart failure) (HCC)    COPD mixed type (HCC)    Hypertension, essential    Impaired fasting glucose    Left ventricular hypertrophy    Mixed hyperlipidemia    Morbid obesity ([facility] )    Obstructive sleep apnea    Noncompliance with CPAP treatment    Pulmonary hypertension ([facility] )    Acute respiratory failure due to COVID-19 ([facility] )    Elevated serum creatinine    Sepsis ([facility] ), POA       Past Medical History  Past Medical History:   Diagnosis Date    CHF (congestive heart failure) ([place] [id number] )     Hypertension     Sleep apnea        Past Surgical History  History reviewed  No pertinent surgical history  Length Of Stay: 7  Performed at least 2 patient identifiers during session: Name and Birthday       12/08/22 1009   PT Last Visit   PT Visit Date 12/08/22   Note Type   Note type Evaluation   Additional Comments BETH Islas confirmed pt appropriate for PT evaluation   Pain Assessment   Pain Assessment Tool 0-10   Pain Score 3   Pain Location/Orientation Orientation: Left; Other (Comment)  (ankle)   Restrictions/Precautions   Weight Bearing Precautions Per Order No   Braces or Orthoses Other (Comment)  (none per pt)   Other Precautions Contact/isolation; Airborne/isolation; Chair Alarm;Multiple lines;Telemetry;O2;Fall Risk;Pain  (HFNC)   Home Living   Type of [address] [place] [address] Two level;Bed/bath upstairs; Able to live on main level with bedroom/bathroom  (6 ANSHUL with [employer], doesn't have to do FOS to access bedroom/bathroom)   Bathroom Shower/Tub   (pt reports he has walk in shower and tub shower available)   [address] Other (Comment)  (none per pt)   [address] Other (Comment)  (none per pt) Prior Function   Level of [place] Independent with ADLs; Independent with functional mobility; Independent with IADLS   Lives With Spouse; Other (Comment)  (adult children)   Receives Help From Family   IADLs Independent with driving; Independent with meal prep; Independent with medication management   Falls in the last 6 months 0  (pt denies falls)   Vocational Full time employment   General   Family/Caregiver Present No   Cognition   Overall Cognitive Status WFL   Arousal/Participation Alert   Orientation Level Oriented X4   Memory Within functional limits   Following Commands Follows all commands and directions without difficulty   Comments Pt agreed to PT evaluation   Subjective   Subjective "I want to get up when I can"   RLE Assessment   RLE Assessment X  (grossly assessed during functional mobility 4-/5)   LLE Assessment   LLE Assessment X  (grossly assessed during functional mobility 4-/5)   Vision-Basic Assessment   Current Vision Does not wear glasses   Coordination   Sensation WFL  (pt without numbness/tingling to BLEs/BUEs)   Bed Mobility   Additional Comments bed mobility not tested as pt seated in bedside chiar at start/end of session   Transfers   Sit to Stand 4  Minimal assistance  (CGA)   Additional items Assist x 1; Increased time required;Verbal cues;Armrests   Stand to Sit 4  Minimal assistance  (CGA)   Additional items Assist x 1; Increased time required;Verbal cues;Armrests   Additional Comments RW used during STS transfers  Patient on HFNC throughout session with SPo2 90-93% throughout  Blood pressure monitored throughout via elva and maintained stable   Ambulation/Elevation   Gait pattern Short stride; Excessively slow;Decreased hip extension;Decreased heel strike;Decreased foot clearance; Improper Weight shift   Gait Assistance 4  Minimal assist   Additional items Assist x 2;Verbal cues   Assistive Device Rolling walker   Distance 10'   Stair Management Assistance Not tested Ambulation/Elevation Additional Comments Forward/backward steps in front of chair for safety and line management  Assist x2 for safety and line management as well   Balance   Static Sitting Good   Dynamic Sitting Fair +   Static Standing Fair -   Dynamic Standing [address], [address] -   Activity Tolerance   Activity Tolerance Patient tolerated treatment well   Medical Staff Made Aware Co treatment with OT Lan Renzo secondary to complex medical condition of pt, possible A of 2 required to achieve and maintain transitional movements, requiring the need of skilled therapeutic intervention of 2 therapists to achieve delivery of services  Nurse Made Aware RN made aware of session outcomes   Assessment   Prognosis Good   Problem List Decreased strength;Decreased endurance; Impaired balance;Decreased mobility;Obesity   Assessment Pt is 46 y o  male seen for PT evaluation s/p admit to [facility] on 12/1/2022 w/ Acute respiratory failure due to COVID-19 [facility])  PT consulted to assess pt's functional mobility and d/c needs  Order placed for PT eval and tx, w/ up w/ A, up as tolerated, activity as tolerated and ambulate patient order  Comorbidities affecting pt's physical performance at time of assessment include: pulmonary hypertension, obstructive sleep apnea, morbid obesity, CHF, acute respiratory failure due to [address], [address]  PTA, pt was living with family in a two story home with 6 ANSHUL  Patient reports independence at baseline with ADLs, IADLs, and functional mobility without AD at baseline  Personal factors affecting pt at time of IE include: inaccessible home environment, lives in 2 story house, ambulating w/ assistive device, stairs to enter home, inability to ambulate household distances, inability to navigate community distances and inability to navigate level surfaces w/o external assistance   Please find objective findings from PT assessment regarding body systems outlined above with impairments and limitations including weakness, impaired balance, decreased endurance, gait deviations, pain, decreased activity tolerance, decreased functional mobility tolerance, decreased safety awareness, fall risk and SOB upon exertion  At time of evaluation, patient greeted seated in bedside chair and agreed to PT session  Patient performed STS Cory (CGA) to RW and progressed to 10 steps with RW (forward/backward steps due to multiple lines) with minAx2  Spo2 maintained 90-93% throughout session and pt without complaints of lightheadedness, chest pain, SOB, dizziness  The following objective measures performed on IE also reveal limitations: AM-PAC 6-Clicks: 34/78  Pt's clinical presentation is currently unstable/unpredictable seen in pt's presentation of abnormal lab values, need for HF O2, review of multiple comorbidities and systems with impairments in endurance, balance, activity tolerance, strength  Pt to benefit from continued PT tx to address deficits as defined above and maximize level of functional independent mobility and consistency  From PT/mobility standpoint, recommendation at time of d/c would be post acute rehabilitation services pending progress in order to facilitate return to PLOF  Barriers to Discharge Inaccessible home environment   Goals   Patient Goals to move more   STG Expiration Date 12/18/22   Short Term Goal #1 Pt will: Perform bed mobility tasks to modified I to increase Indep in home environment, decrease risk for falls and improve ease of bed mobility  Perform transfers to modified I to increase Indep in home environment, decrease risk for falls and improve ease of transfers  Perform ambulation with RW 50' supervision to  improve activity tolerance, improve gait quality and promote proper use of assistive device  Increase dynamic standing balance to F+ to decrease fall risk  Further mobility assessment required by PT to create stair goals as able/appropriate based on pt progress     PT Treatment Day 0   Plan   Treatment/Interventions Functional transfer training; Therapeutic exercise; Endurance training;Patient/family training;Bed mobility;Gait training;Spoke to nursing;OT;Continued evaluation   PT Frequency 4-6x/wk   Recommendation   PT Discharge Recommendation Post acute rehabilitation services   Equipment Recommended Other (Comment)  (TBD based on pt progress)   AM-PAC Basic Mobility Inpatient   Turning in Bed Without Bedrails 3   Lying on Back to Sitting on Edge of Flat Bed 2   Moving Bed to Chair 3   Standing Up From Chair 3   Walk in Room 2   Climb 3-5 Stairs 2   Basic Mobility Inpatient Raw Score 15   Basic Mobility Standardized Score 36 97   Highest Level Of Mobility   JH-HLM Goal 4: Move to chair/commode   JH-HLM Achieved 6: Walk 10 steps or more   End of Consult   Patient Position at End of Consult All needs within reach;Bed/Chair alarm activated; Bedside chair       Time In: 1009  Time Out: 1032  Total Evaluation Minutes:23    Luberta Kanner, PT

## 2022-12-08 NOTE — PLAN OF CARE
Problem: Potential for Falls  Goal: Patient will remain free of falls  Description: INTERVENTIONS:  - Educate patient/family on patient safety including physical limitations  - Instruct patient to call for assistance with activity   - Consult OT/PT to assist with strengthening/mobility   - Keep Call bell within reach  - Keep bed low and locked with side rails adjusted as appropriate  - Keep care items and personal belongings within reach  - Initiate and maintain comfort rounds  - Make Fall Risk Sign visible to staff  - Offer Toileting every 2 Hours, in advance of need  - Initiate/Maintain alarm  - Obtain necessary fall risk management equipment:   - Apply yellow socks and bracelet for high fall risk patients  - Consider moving patient to room near nurses station  Outcome: Progressing     Problem: Nutrition/Hydration-ADULT  Goal: Nutrient/Hydration intake appropriate for improving, restoring or maintaining nutritional needs  Description: Monitor and assess patient's nutrition/hydration status for malnutrition  Collaborate with interdisciplinary team and initiate plan and interventions as ordered  Monitor patient's weight and dietary intake as ordered or per policy  Utilize nutrition screening tool and intervene as necessary  Determine patient's food preferences and provide high-protein, high-caloric foods as appropriate       INTERVENTIONS:  - Monitor oral intake, urinary output, labs, and treatment plans  - Assess nutrition and hydration status and recommend course of action  - Evaluate amount of meals eaten  - Assist patient with eating if necessary   - Allow adequate time for meals  - Recommend/ encourage appropriate diets, oral nutritional supplements, and vitamin/mineral supplements  - Order, calculate, and assess calorie counts as needed  - Recommend, monitor, and adjust tube feedings and TPN/PPN based on assessed needs  - Assess need for intravenous fluids  - Provide specific nutrition/hydration education as appropriate  - Include patient/family/caregiver in decisions related to nutrition  Outcome: Progressing     Problem: MOBILITY - ADULT  Goal: Maintain or return to baseline ADL function  Description: INTERVENTIONS:  -  Assess patient's ability to carry out ADLs; assess patient's baseline for ADL function and identify physical deficits which impact ability to perform ADLs (bathing, care of mouth/teeth, toileting, grooming, dressing, etc )  - Assess/evaluate cause of self-care deficits   - Assess range of motion  - Assess patient's mobility; develop plan if impaired  - Assess patient's need for assistive devices and provide as appropriate  - Encourage maximum independence but intervene and supervise when necessary  - Involve family in performance of ADLs  - Assess for home care needs following discharge   - Consider OT consult to assist with ADL evaluation and planning for discharge  - Provide patient education as appropriate  Outcome: Progressing  Goal: Maintains/Returns to pre admission functional level  Description: INTERVENTIONS:  - Perform BMAT or MOVE assessment daily    - Set and communicate daily mobility goal to care team and patient/family/caregiver  - Collaborate with rehabilitation services on mobility goals if consulted  - Perform Range of Motion 3 times a day  - Reposition patient every 2 hours    - Dangle patient 1 times a day  - Stand patient 2 times a day  - Ambulate patient  times a day  - Out of bed to chair 1 times a day   - Out of bed for meals 2 times a day  - Out of bed for toileting  - Record patient progress and toleration of activity level   Outcome: Progressing     Problem: Prexisting or High Potential for Compromised Skin Integrity  Goal: Skin integrity is maintained or improved  Description: INTERVENTIONS:  - Identify patients at risk for skin breakdown  - Assess and monitor skin integrity  - Assess and monitor nutrition and hydration status  - Monitor labs   - Assess for incontinence   - Turn and reposition patient  - Assist with mobility/ambulation  - Relieve pressure over bony prominences  - Avoid friction and shearing  - Provide appropriate hygiene as needed including keeping skin clean and dry  - Evaluate need for skin moisturizer/barrier cream  - Collaborate with interdisciplinary team   - Patient/family teaching  - Consider wound care consult   Outcome: Progressing     Problem: CARDIOVASCULAR - ADULT  Goal: Maintains optimal cardiac output and hemodynamic stability  Description: INTERVENTIONS:  - Monitor I/O, vital signs and rhythm  - Monitor for S/S and trends of decreased cardiac output  - Administer and titrate ordered vasoactive medications to optimize hemodynamic stability  - Assess quality of pulses, skin color and temperature  - Assess for signs of decreased coronary artery perfusion  - Instruct patient to report change in severity of symptoms  Outcome: Progressing  Goal: Absence of cardiac dysrhythmias or at baseline rhythm  Description: INTERVENTIONS:  - Continuous cardiac monitoring, vital signs, obtain 12 lead EKG if ordered  - Administer antiarrhythmic and heart rate control medications as ordered  - Monitor electrolytes and administer replacement therapy as ordered  Outcome: Progressing     Problem: RESPIRATORY - ADULT  Goal: Achieves optimal ventilation and oxygenation  Description: INTERVENTIONS:  - Assess for changes in respiratory status  - Assess for changes in mentation and behavior  - Position to facilitate oxygenation and minimize respiratory effort  - Oxygen administered by appropriate delivery if ordered  - Initiate smoking cessation education as indicated  - Encourage broncho-pulmonary hygiene including cough, deep breathe, Incentive Spirometry  - Assess the need for suctioning and aspirate as needed  - Assess and instruct to report SOB or any respiratory difficulty  - Respiratory Therapy support as indicated  Outcome: Progressing     Problem: GENITOURINARY - ADULT  Goal: Urinary catheter remains patent  Description: INTERVENTIONS:  - Assess patency of urinary catheter  - If patient has a chronic diallo, consider changing catheter if non-functioning  - Follow guidelines for intermittent irrigation of non-functioning urinary catheter  Outcome: Progressing     Problem: METABOLIC, FLUID AND ELECTROLYTES - ADULT  Goal: Electrolytes maintained within normal limits  Description: INTERVENTIONS:  - Monitor labs and assess patient for signs and symptoms of electrolyte imbalances  - Administer electrolyte replacement as ordered  - Monitor response to electrolyte replacements, including repeat lab results as appropriate  - Instruct patient on fluid and nutrition as appropriate  Outcome: Progressing  Goal: Glucose maintained within target range  Description: INTERVENTIONS:  - Monitor Blood Glucose as ordered  - Assess for signs and symptoms of hyperglycemia and hypoglycemia  - Administer ordered medications to maintain glucose within target range  - Assess nutritional intake and initiate nutrition service referral as needed  Outcome: Progressing

## 2022-12-08 NOTE — OCCUPATIONAL THERAPY NOTE
Occupational Therapy Evaluation     Patient Name: Jb Grier  [facility]'S Date: 12/8/2022      Problem List  Principal Problem:    Acute respiratory failure due to COVID-19 [facility])  Active Problems:    CHF (congestive heart failure) ([facility] )    Mixed hyperlipidemia    Morbid obesity ([facility] )    Obstructive sleep apnea    Pulmonary hypertension (HCC)    Elevated serum creatinine    Sepsis ([facility] ), POA    Past Medical History  Past Medical History:   Diagnosis Date    CHF (congestive heart failure) ([facility] )     Hypertension     Sleep apnea      Past Surgical History  History reviewed  No pertinent surgical history  12/08/22 1011   OT Last Visit   OT Visit Date 12/08/22   Note Type   Note type Evaluation   Pain Assessment   Pain Assessment Tool 0-10   Pain Score 3   Pain Location/Orientation Orientation: Left  (ankle)   Restrictions/Precautions   Weight Bearing Precautions Per Order No   Other Precautions Contact/isolation; Airborne/isolation;Multiple lines;O2;Telemetry; Fall Risk   Home Living   Type of [address] [place] [address] Two level;Bed/bath upstairs; Able to live on main level with bedroom/bathroom  (6 ANSHUL with [employer], doesn't have to do FOS)   Bathroom Shower/Tub Walk-in shower  (and tub)   Bathroom Equipment   (none)   Home Equipment   (none)   Prior Function   Level of [place] Independent with ADLs; Independent with functional mobility; Independent with IADLS   Lives With Spouse  (children)   Receives Help From Family   IADLs Independent with driving; Independent with meal prep; Independent with medication management   Falls in the last 6 months 0   Vocational Full time employment   General   Family/Caregiver Present No   Additional General Comments pt received seated in bed side chair, NAD, tele, radial a-line, HFNC   Subjective   Subjective Pt agreeable to therapy evaluation   ADL   Eating Assistance 6  Modified independent   Eating Deficit Setup   Grooming Assistance 5  Supervision/Setup   Grooming Deficit Setup;Supervision/safety; Increased time to complete  (while seated)   UB Dressing Assistance 4  Minimal Assistance   UB Dressing Deficit Setup;Supervision/safety; Increased time to complete  (assist for line management)   LB Dressing Assistance 3  Moderate Assistance   LB Dressing Deficit Setup;Verbal cueing;Supervision/safety; Increased time to complete   Bed Mobility   Additional Comments Pt received sitting upright in bed side chair at start of session   Transfers   Sit to Stand 4  Minimal assistance   Additional items Assist x 1; Armrests; Increased time required;Verbal cues  (with RW)   Stand to Sit 4  Minimal assistance   Additional items Assist x 1; Armrests; Verbal cues   Functional Mobility   Functional Mobility 4  Minimal assistance  (CGA)   Additional Comments assist x1   Additional items Rolling walker   Activity Tolerance   Activity Tolerance Patient tolerated treatment well  (Vitial signs and SpO2 stable throughout session)   Medical Staff Made Aware Peewee Crews PT  Pt seen for co-evaluation with Physical Therapist due to pt's medical complexity, functional limitations and limited activity tolerance  Nurse Made Aware [place] [facility] RN   RUE Assessment   RUE Assessment WFL   LUE Assessment   LUE Assessment WFL   Hand Function   Gross Motor Coordination Functional   Fine Motor Coordination Functional   Sensation   Light Touch No apparent deficits   Vision-Basic Assessment   Current Vision Does not wear glasses   Psychosocial   Psychosocial (WDL) WDL   Patient Behaviors/Mood Calm; Cooperative   Cognition   Overall Cognitive Status WFL   Arousal/Participation Alert; Cooperative   Attention Within functional limits   Orientation Level Oriented X4   Memory Within functional limits   Following Commands Follows all commands and directions without difficulty   Assessment   Limitation Decreased ADL status; Decreased endurance;Decreased high-level ADLs   Assessment Pt is a 46 y o  male seen for OT evaluation s/p admit to [facility] on 12/1/2022 w/ Acute respiratory failure due to COVID-19 [facility])  Comorbidities affecting pt's functional performance at time of assessment include: HTN, obesity, COPD and CHF  Personal factors affecting pt at time of IE include:steps to enter environment, difficulty performing IADLS  and health management   Prior to admission, pt was independent with all ADLs and functional mobility without AD  Upon evaluation: Pt requires Minimal Assistance x1 with RW for mobility and Moderate Assistance for some basic self care 2* the following deficits impacting occupational performance: decreased tolerance and increased O2 requirements  Pt to benefit from continued skilled OT tx while in the hospital to address deficits as defined above and maximize level of functional independence w ADL's and functional mobility  Occupational Performance areas to address include: grooming, bathing/shower, toilet hygiene, dressing and community mobility  From OT standpoint, recommendation at time of d/c would be home with [address] [place] [address], pending progress  Goals   Patient Goals to be able to move more   Plan   Treatment Interventions ADL retraining;Functional transfer training;Cognitive reorientation;Patient/family training;Equipment evaluation/education   Goal Expiration Date 12/22/22   OT Treatment Day 0   OT Frequency 2-3x/wk   Recommendation   OT Discharge Recommendation Home with home health rehabilitation   Additional Comments  The patient's raw score on the AM-PAC Daily Activity inpatient short form is 18, standardized score is 38 66, less than 39 4  Patients at this level are likely to benefit from discharge to post-acute rehabilitation services  Please refer to the recommendation of the Occupational Therapist for safe discharge planning     AM-PAC Daily Activity Inpatient   Lower Body Dressing 2   Bathing 3   Toileting 3   Upper Body Dressing 3   Grooming 3   Eating 4   Daily Activity Raw Score 18   Daily Activity Standardized Score (Calc for Raw Score >=11) 38 66   AM-PAC Applied Cognition Inpatient   Following a Speech/Presentation 4   Understanding Ordinary Conversation 4   Taking Medications 4   Remembering Where Things Are Placed or Put Away 4   Remembering List of 4-5 Errands 4   Taking Care of Complicated Tasks 4   Applied Cognition Raw Score 24   Applied Cognition Standardized Score 62 21       Goals: to be met by 10/22/22    Patient will perform functional bed mobility with Standby Assistance, with HOB flat, no rails  Patient will perform functional transfers with [place] in preparation for ADL tasks, with good safety awareness  Patient will perform UB dressing task with independence while seated, with set up  Patient will perform LB dressing task with [address]  Patient will perform toilet transfer with [address]  Patient will perform toileting with [address], including hygiene and clothing management   Patient will improve activity tolerance by participating in 20 minutes of session at a time in preparation for participation in ADL tasks  Patient will identify 3 potential fall hazards and identify compensatory techniques to decrease fall risk in the home environment       Karla Mejias OTR/L

## 2022-12-08 NOTE — PLAN OF CARE
Problem: OCCUPATIONAL THERAPY ADULT  Goal: Performs self-care activities at highest level of function for planned discharge setting  See evaluation for individualized goals  Description: Treatment Interventions: ADL retraining, Functional transfer training, Cognitive reorientation, Patient/family training, Equipment evaluation/education          See flowsheet documentation for full assessment, interventions and recommendations  Outcome: Progressing  Note: Limitation: Decreased ADL status, Decreased endurance, Decreased high-level ADLs     Assessment: Pt is a 46 y o  male seen for OT evaluation s/p admit to [facility] on 12/1/2022 w/ Acute respiratory failure due to COVID-19 [facility])  Comorbidities affecting pt's functional performance at time of assessment include: HTN, obesity, COPD and CHF  Personal factors affecting pt at time of IE include:steps to enter environment, difficulty performing IADLS  and health management   Prior to admission, pt was independent with all ADLs and functional mobility without AD  Upon evaluation: Pt requires Minimal Assistance x1 with RW for mobility and Moderate Assistance for some basic self care 2* the following deficits impacting occupational performance: decreased tolerance and increased O2 requirements  Pt to benefit from continued skilled OT tx while in the hospital to address deficits as defined above and maximize level of functional independence w ADL's and functional mobility  Occupational Performance areas to address include: grooming, bathing/shower, toilet hygiene, dressing and community mobility  From OT standpoint, recommendation at time of d/c would be home with [address] [place] [address]'S [place], pending progress       OT Discharge Recommendation: Home with home health rehabilitation        Gricel Oliva OTR/L

## 2022-12-08 NOTE — PLAN OF CARE
Problem: PHYSICAL THERAPY ADULT  Goal: Performs mobility at highest level of function for planned discharge setting  See evaluation for individualized goals  Description: Treatment/Interventions: Functional transfer training, Therapeutic exercise, Endurance training, Patient/family training, Bed mobility, Gait training, Spoke to nursing, OT, Continued evaluation  Equipment Recommended: Other (Comment) (TBD based on pt progress)       See flowsheet documentation for full assessment, interventions and recommendations  Note: Prognosis: Good  Problem List: Decreased strength, Decreased endurance, Impaired balance, Decreased mobility, Obesity  Assessment: Pt is 46 y o  male seen for PT evaluation s/p admit to [facility] on 12/1/2022 w/ Acute respiratory failure due to COVID-19 [facility])  PT consulted to assess pt's functional mobility and d/c needs  Order placed for PT eval and tx, w/ up w/ A, up as tolerated, activity as tolerated and ambulate patient order  Comorbidities affecting pt's physical performance at time of assessment include: pulmonary hypertension, obstructive sleep apnea, morbid obesity, CHF, acute respiratory failure due to [address]  PTA, pt was living with family in a two story home with 6 ANSHUL  Patient reports independence at baseline with ADLs, IADLs, and functional mobility without AD at baseline  Personal factors affecting pt at time of IE include: inaccessible home environment, lives in 2 story house, ambulating w/ assistive device, stairs to enter home, inability to ambulate household distances, inability to navigate community distances and inability to navigate level surfaces w/o external assistance   Please find objective findings from PT assessment regarding body systems outlined above with impairments and limitations including weakness, impaired balance, decreased endurance, gait deviations, pain, decreased activity tolerance, decreased functional mobility tolerance, decreased safety awareness, fall risk and SOB upon exertion  At time of evaluation, patient greeted seated in bedside chair and agreed to PT session  Patient performed STS Cory (CGA) to RW and progressed to 10 steps with RW (forward/backward steps due to multiple lines) with minAx2  Spo2 maintained 90-93% throughout session and pt without complaints of lightheadedness, chest pain, SOB, dizziness  The following objective measures performed on IE also reveal limitations: AM-PAC 6-Clicks: 34/96  Pt's clinical presentation is currently unstable/unpredictable seen in pt's presentation of abnormal lab values, need for HF O2, review of multiple comorbidities and systems with impairments in endurance, balance, activity tolerance, strength  Pt to benefit from continued PT tx to address deficits as defined above and maximize level of functional independent mobility and consistency  From PT/mobility standpoint, recommendation at time of d/c would be post acute rehabilitation services pending progress in order to facilitate return to PLOF  Barriers to Discharge: Inaccessible home environment     PT Discharge Recommendation: Post acute rehabilitation services    See flowsheet documentation for full assessment        Annika Milton; PT, DPT

## 2022-12-09 LAB
APTT PPP: 55 SECONDS (ref 23–37)
APTT PPP: 78 SECONDS (ref 23–37)
APTT PPP: 93 SECONDS (ref 23–37)
BASOPHILS # BLD AUTO: 0 THOUSANDS/ÂΜL (ref 0–0.1)
BASOPHILS NFR BLD AUTO: 0 % (ref 0–1)
CRP SERPL QL: <3 MG/L
D DIMER PPP FEU-MCNC: 0.69 UG/ML FEU
EOSINOPHIL # BLD AUTO: 0 THOUSAND/ÂΜL (ref 0–0.61)
EOSINOPHIL NFR BLD AUTO: 0 % (ref 0–6)
ERYTHROCYTE [DISTWIDTH] IN BLOOD BY AUTOMATED COUNT: 14.7 % (ref 11.6–15.1)
HCT VFR BLD AUTO: 46.5 % (ref 36.5–49.3)
HGB BLD-MCNC: 13.8 G/DL (ref 12–17)
IMM GRANULOCYTES # BLD AUTO: 0.03 THOUSAND/UL (ref 0–0.2)
IMM GRANULOCYTES NFR BLD AUTO: 0 % (ref 0–2)
LYMPHOCYTES # BLD AUTO: 0.43 THOUSANDS/ÂΜL (ref 0.6–4.47)
LYMPHOCYTES NFR BLD AUTO: 6 % (ref 14–44)
MCH RBC QN AUTO: 24.3 PG (ref 26.8–34.3)
MCHC RBC AUTO-ENTMCNC: 29.7 G/DL (ref 31.4–37.4)
MCV RBC AUTO: 82 FL (ref 82–98)
MONOCYTES # BLD AUTO: 0.22 THOUSAND/ÂΜL (ref 0.17–1.22)
MONOCYTES NFR BLD AUTO: 3 % (ref 4–12)
NEUTROPHILS # BLD AUTO: 6.93 THOUSANDS/ÂΜL (ref 1.85–7.62)
NEUTS SEG NFR BLD AUTO: 91 % (ref 43–75)
NRBC BLD AUTO-RTO: 0 /100 WBCS
NT-PROBNP SERPL-MCNC: 527 PG/ML
PLATELET # BLD AUTO: 140 THOUSANDS/UL (ref 149–390)
PMV BLD AUTO: 10.5 FL (ref 8.9–12.7)
RBC # BLD AUTO: 5.68 MILLION/UL (ref 3.88–5.62)
WBC # BLD AUTO: 7.61 THOUSAND/UL (ref 4.31–10.16)

## 2022-12-09 RX ADMIN — SILDENAFIL 20 MG: 20 TABLET ORAL at 17:24

## 2022-12-09 RX ADMIN — SILDENAFIL 20 MG: 20 TABLET ORAL at 21:01

## 2022-12-09 RX ADMIN — SILDENAFIL 20 MG: 20 TABLET ORAL at 08:18

## 2022-12-09 RX ADMIN — ISODIUM CHLORIDE 3 ML: 0.03 SOLUTION RESPIRATORY (INHALATION) at 08:09

## 2022-12-09 RX ADMIN — METOPROLOL TARTRATE 50 MG: 50 TABLET, FILM COATED ORAL at 21:01

## 2022-12-09 RX ADMIN — ISODIUM CHLORIDE 3 ML: 0.03 SOLUTION RESPIRATORY (INHALATION) at 20:33

## 2022-12-09 RX ADMIN — BARICITINIB 4 MG: 2 TABLET, FILM COATED ORAL at 08:18

## 2022-12-09 RX ADMIN — LEVALBUTEROL HYDROCHLORIDE 1.25 MG: 1.25 SOLUTION, CONCENTRATE RESPIRATORY (INHALATION) at 20:33

## 2022-12-09 RX ADMIN — HEPARIN SODIUM 2000 UNITS: 1000 INJECTION INTRAVENOUS; SUBCUTANEOUS at 13:30

## 2022-12-09 RX ADMIN — DEXAMETHASONE SODIUM PHOSPHATE 18.4 MG: 10 INJECTION, SOLUTION INTRAMUSCULAR; INTRAVENOUS at 19:06

## 2022-12-09 RX ADMIN — LEVALBUTEROL HYDROCHLORIDE 1.25 MG: 1.25 SOLUTION, CONCENTRATE RESPIRATORY (INHALATION) at 13:25

## 2022-12-09 RX ADMIN — ATORVASTATIN CALCIUM 10 MG: 10 TABLET, FILM COATED ORAL at 17:24

## 2022-12-09 RX ADMIN — DEXAMETHASONE SODIUM PHOSPHATE 18.4 MG: 10 INJECTION, SOLUTION INTRAMUSCULAR; INTRAVENOUS at 08:17

## 2022-12-09 RX ADMIN — ISODIUM CHLORIDE 3 ML: 0.03 SOLUTION RESPIRATORY (INHALATION) at 13:25

## 2022-12-09 RX ADMIN — METOPROLOL TARTRATE 50 MG: 50 TABLET, FILM COATED ORAL at 08:18

## 2022-12-09 RX ADMIN — LEVALBUTEROL HYDROCHLORIDE 1.25 MG: 1.25 SOLUTION, CONCENTRATE RESPIRATORY (INHALATION) at 08:09

## 2022-12-09 RX ADMIN — HEPARIN SODIUM 18 UNITS/KG/HR: 10000 INJECTION, SOLUTION INTRAVENOUS at 10:39

## 2022-12-09 NOTE — ASSESSMENT & PLAN NOTE
[facility] at 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

## 2022-12-09 NOTE — CASE MANAGEMENT
Case Management Progress Note    Patient name Gonzales Joy  Location / MRN [id number]  : 1970 Date 2022       LOS (days): 8  Geometric Mean LOS (GMLOS) (days): 5 00  Days to GMLOS:-2 7        OBJECTIVE:        Current admission status: Inpatient  Preferred Pharmacy:   PATIENT/FAMILY REPORTS NO PREFERRED PHARMACY  No address on file      Primary Care Provider: No primary care provider on file  Primary Insurance: BLUE CROSS  Secondary Insurance:     PROGRESS NOTE:    CM attempted to contact pt in pt's room at [phone], pt did not answer  Cm attempted to contact pt's wife, Maritza Hamilton) at [phone]  CM requested return pc to discuss therapy recommendation

## 2022-12-09 NOTE — PROGRESS NOTES
[facility]  Progress Note Danielle Montgomery 1970, 46 y o  male MRN: [id number]  Unit/Bed#:  Encounter: [id number]  Primary Care Provider: No primary care provider on file  Date and time admitted to hospital: 12/1/2022  5:52 PM    Elevated serum creatinine  Assessment & Plan  · Now back at baseline  · Continue to monitor I&O and renal indices  · Holding diuresis as patient appears euvolemic    Pulmonary hypertension (HCC)  Assessment & Plan  Continue sildenafil   Epoprostenol weaned to off overnight     Obstructive sleep apnea  Assessment & Plan  BiPAP at [facility]  Patient 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  Would benefit from sleep study as outpatient  Morbid obesity ([facility] [address] )  Assessment & Plan  Nutrition consulted  Encourage lifestyle modifications    Mixed hyperlipidemia  Assessment & Plan  Continue home statin    CHF (congestive heart failure) ([facility])  Assessment & Plan  Wt Readings from Last 3 Encounters:   12/04/22 (!) 178 kg (391 lb 8 6 oz)     · Hold lasix given euvolemic state, contraction alkalemia; Continue negative fluid balance  · Daily weights  · Epoprostenol weaned to off via minimization protocol, monitor closely for rebound hypoxia,  Continue to wean  oxygen   · Continue sildenafil      * Acute respiratory failure due to COVID-19 [facility])  Assessment & Plan  · Infectious markers at presentation:  · CRP 20 0  · Now down to < 3  · Trend Q2 days   Daily if worsening  · D-Dimer 3 50  · Started on heparin gtt for AFib  · PE study on arrival negative  · LE Duplex negative  · D-Dimer now 0 86  · Oxygen: HFNC 80% 60L  · Pathway: Very severe  · Steroids: Decadron 0 1 mg/kg IV Q12 Day 5/10  · Can stop if clinical improvement earlier  · Baricitinib: Ordered for day 5 but only has received 3 doses given midnight timing and patient on BiPAP  · Changed timing to QPM  · If on RA for 24 hours can D/C  · Antibiotics  · None  · Procal <0 05  · Anticoagulation  · Hep gtt for AFib  · Remdesivir: Completed  · Discussed importance of self proning  · OOB TID, early mobilization           ----------------------------------------------------------------------------------------  HPI/24hr events: oxygen requirements slowly decreasing     Patient appropriate for transfer out of the ICU today?: No  Disposition: Continue Stepdown Level 1 level of care   Code Status: Level 1 - Full Code  ---------------------------------------------------------------------------------------  SUBJECTIVE  Offers no complaints     Review of Systems  Review of systems was reviewed and negative unless stated above in HPI/24-hour events   ---------------------------------------------------------------------------------------  OBJECTIVE    Vitals   Vitals:    22 2100 22 2200 22 2300 22 0000   BP:       BP Location:       Pulse: 94 99 83 81   Resp: 19 20 16 19   Temp:   98 1 °F (36 7 °C)    TempSrc:   Oral    SpO2: 91% 90% 91% 91%   Weight:       Height:         Temp (24hrs), Av 1 °F (36 7 °C), Min:97 9 °F (36 6 °C), Max:98 2 °F (36 8 °C)  Current: Temperature: 98 1 °F (36 7 °C)  Arterial Line BP: 156/99  Arterial Line MAP (mmHg): 115 mmHg    Respiratory:  SpO2: SpO2: 91 %  Nasal Cannula O2 Flow Rate (L/min): 55 L/min (HF)    Invasive/non-invasive ventilation settings   Respiratory    Lab Data (Last 4 hours)    None         O2/Vent Data (Last 4 hours)       2333          Non-Invasive Ventilation Mode BiPAP                   Physical Exam  Constitutional:       General: He is awake  Comments: BIPAP    HENT:      Head: Normocephalic and atraumatic  Eyes:      General: Lids are normal       Extraocular Movements: Extraocular movements intact  Conjunctiva/sclera: Conjunctivae normal    Cardiovascular:      Rate and Rhythm: Rhythm regularly irregular  Pulses: Normal pulses  Pulmonary:      Breath sounds: Decreased breath sounds present  Abdominal:      General: There is distension  Tenderness: There is no abdominal tenderness  Musculoskeletal:      Right lower leg: No edema  Left lower leg: No edema  Skin:     General: Skin is warm and dry  Neurological:      Mental Status: He is alert  GCS: GCS eye subscore is 4  GCS verbal subscore is 5  GCS motor subscore is 6  Laboratory and Diagnostics:  Results from last 7 days   Lab Units 12/08/22  0526 12/06/22  0436 12/05/22  1109 12/05/22  0440 12/04/22  0556 12/03/22  0458 12/03/22  0218 12/02/22  2155 12/02/22  0448   WBC Thousand/uL 6 12 7 41 10 41* 9 95 9 53 10 38*  --   --  10 64*   HEMOGLOBIN g/dL 13 6 13 5 14 0 13 5 13 8 13 6  --   --  14 4   I STAT HEMOGLOBIN g/dl  --   --   --   --   --   --  15 6   < >  --    HEMATOCRIT % 46 9 47 6 49 3 48 4 49 1 49 3  --   --  51 5*   HEMATOCRIT, ISTAT %  --   --   --   --   --   --  46   < >  --    PLATELETS Thousands/uL 150 179 219 206 229 232  --   --  232   NEUTROS PCT % 88* 90*  --  91* 89* 90*  --   --   --    MONOS PCT % 5 4  --  4 4 2*  --   --   --     < > = values in this interval not displayed       Results from last 7 days   Lab Units 12/08/22  0526 12/07/22  0434 12/06/22  0436 12/06/22  0015 12/05/22  1800 12/05/22  1109 12/05/22  0440 12/04/22  1158 12/04/22  0556 12/03/22  1054 12/03/22  0458   SODIUM mmol/L 143 144 143 144 150* 150* 148*   < > 149*   < > 147   POTASSIUM mmol/L 4 6 4 4 4 1 4 1 4 0 4 2 4 3   < > 4 7   < > 5 5*   CHLORIDE mmol/L 103 103 103 102 103 104 105   < > 103   < > 103   CO2 mmol/L 36* 37* 38* 40* 40* 39* 34*   < > 40*   < > 37*   ANION GAP mmol/L 4 4 2* 2* 7 7 9   < > 6   < > 7   BUN mg/dL 34* 35* 35* 37* 39* 41* 43*   < > 39*   < > 32*   CREATININE mg/dL 0 86 0 90 0 93 0 95 1 06 1 15 1 18   < > 1 32*   < > 1 31*   CALCIUM mg/dL 8 8 8 9 9 0 8 7 9 1 9 2 9 1   < > 9 1   < > 8 4   GLUCOSE RANDOM mg/dL 154* 156* 162* 143* 174* 150* 153*   < > 164*   < > 146*   ALT U/L 30 32  --   --   --   --  27 --  32  --  28   AST U/L 10 16  --   --   --   --  25  --  36  --  22   ALK PHOS U/L 39* 44*  --   --   --   --  45*  --  49  --  54   ALBUMIN g/dL 2 9* 3 0*  --   --   --   --  3 1*  --  3 2*  --  3 0*   TOTAL BILIRUBIN mg/dL 1 06* 1 01*  --   --   --   --  0 70  --  0 52  --  0 50    < > = values in this interval not displayed  Results from last 7 days   Lab Units 12/08/22  0526 12/07/22  0434 12/06/22  0436 12/05/22  0440 12/04/22  0556 12/03/22  0458   MAGNESIUM mg/dL 2 2 2 1 2 5 2 3 2 2 2 0   PHOSPHORUS mg/dL 3 7 3 9 3 9 4 2 4 6* 4 2      Results from last 7 days   Lab Units 12/08/22  0526 12/07/22  0434 12/06/22  2232 12/06/22  1637 12/06/22  0841 12/06/22  0105 12/05/22  1800 12/05/22  1109   INR   --   --   --   --   --   --   --  1 23*   PTT seconds 67* 75* 76* 85* 42* 52* 32 26              ABG:  Results from last 7 days   Lab Units 12/08/22  1619   PH ART  7 384   PCO2 ART mm Hg 66 1*   PO2 ART mm Hg 66 5*   HCO3 ART mmol/L 38 6*   BASE EXC ART mmol/L 10 5   ABG SOURCE  Line, Arterial     VBG:  Results from last 7 days   Lab Units 12/08/22  1619 12/03/22  1435 12/03/22  1054   PH HERNESTO   --   --  7 250*   PCO2 HERNESTO mm Hg  --   --  98 1*   PO2 HERNESTO mm Hg  --   --  63 7*   HCO3 HERNESTO mmol/L  --   --  42 1*   BASE EXC HERNESTO mmol/L  --   --  10 2   ABG SOURCE  Line, Arterial   < >  --     < > = values in this interval not displayed  Results from last 7 days   Lab Units 12/06/22  0436 12/03/22  0458 12/02/22  1256   PROCALCITONIN ng/ml <0 05 0 07 0 11       Micro        EKG: atrial fibrillation   Imaging:   XR chest portable ICU   Final Result      Compromised by body habitus and motion with no definite acute disease  Cardiomegaly  Workstation performed: [id number]         CTA ED chest PE Study   Final Result         1  Mild to moderate cardiomegaly with small right pleural effusion  2  Enlarged pulmonary artery suggesting pulmonary hypertension     3  Subsegmental atelectasis versus pneumonia right lower lobe  4  No evidence of pulmonary embolism given limitations  5  Small perihepatic ascites  Workstation performed: [id number]         XR chest 1 view portable   Final Result      Cardiomegaly   Hazy density in the lungs with increased lung markings suggest congestion  Small left effusion                  Workstation performed: [id number]         VAS lower limb venous duplex study, complete bilateral    (Results Pending)       Intake and Output  I/O        0701   07 0701   07    P  O  240 960    I V  (mL/kg) 482 (2 7)     IV Piggyback 200     Total Intake(mL/kg) 922 (5 2) 960 (5 4)    Urine (mL/kg/hr) 1755 (0 4) 825 (0 2)    Total Output 1755 825    Net -833 +135                Height and Weights   Height: 5' 11" (180 3 cm)  IBW (Ideal Body Weight): 75 3 kg  Body mass index is 54 61 kg/m²  Weight (last 2 days)     None            Nutrition       Diet Orders   (From admission, onward)             Start     Ordered    22 1103  Diet Cardiovascular; Cardiac; Fluid Restriction 1500 ML, Consistent Carbohydrate Diet Level 2 (5 carb servings/75 grams CHO/meal)  Diet effective now        References:    Nutrtion Support Algorithm Enteral vs  Parenteral   Question Answer Comment   Diet Type Cardiovascular    Cardiac Cardiac    Other Restriction(s): Fluid Restriction 1500 ML    Other Restriction(s): Consistent Carbohydrate Diet Level 2 (5 carb servings/75 grams CHO/meal)    RD to adjust diet per protocol?  Yes        22 1105                  Active Medications  Scheduled Meds:  Current Facility-Administered Medications   Medication Dose Route Frequency Provider Last Rate   • acetaminophen  650 mg Oral Q6H PRN ELIZABETH Martinez     • albuterol  2 puff Inhalation Q4H PRN Caryl [place] ELIZABETH Meléndez     • [facility]atin  [facility] [address] [facility]     • baricitinib  4 mg Oral Q24H Irineo Ziegler CHONG     • dexamethasone  0 1 mg/kg Intravenous Q12H ELIZABETH Levine 18 4 mg (12/08/22 1912)   • dextromethorphan-guaiFENesin  10 mL Oral Q4H PRN ELIZABETH Levine     • heparin (porcine)  3-20 Units/kg/hr (Order-Specific) Intravenous Titrated Andie Abdirashid, PA-C 20 Units/kg/hr (12/08/22 2038)   • heparin (porcine)  2,000 Units Intravenous Q1H PRN Andie Beckbarbara PA-C     • heparin (porcine)  4,000 Units Intravenous Q1H PRN Andie Abdirashid, PA-C     • levalbuterol  1 25 mg Nebulization TID ELIZABETH Triplett     • metoprolol tartrate  50 mg Oral Q12H [address] [place]     • sildenafil  20 mg Oral TID ELIZABETH Khan     • sodium chloride  3 mL Nebulization TID ELIZABETH Triplett       Continuous Infusions:  heparin (porcine), 3-20 Units/kg/hr (Order-Specific), Last Rate: 20 Units/kg/hr (12/08/22 2038)      PRN Meds:   acetaminophen, 650 mg, Q6H PRN  albuterol, 2 puff, Q4H PRN  dextromethorphan-guaiFENesin, 10 mL, Q4H PRN  heparin (porcine), 2,000 Units, Q1H PRN  heparin (porcine), 4,000 Units, Q1H PRN        Invasive Devices Review  Invasive Devices     Peripheral Intravenous Line  Duration           Peripheral IV Distal;Left;Upper Forearm -- days    Peripheral IV 12/03/22 Left;Ventral (anterior) Forearm 5 days    Peripheral IV 12/08/22 Dorsal (posterior); Right Hand <1 day          Arterial Line  Duration           Arterial Line 12/03/22 Radial 5 days          Drain  Duration           Urethral Catheter 6 days                Rationale for remaining devices: NA   ---------------------------------------------------------------------------------------  Advance Directive and Living Will:      Power of :    POLST:    ---------------------------------------------------------------------------------------  Care Time Delivered:   Upon my evaluation, this patient had a high probability of imminent or life-threatening deterioration due to acute hypoxic respiratory failure, which required my direct attention, intervention, and personal management  I have personally provided 30 minutes (0058 to 0128) of critical care time, exclusive of procedures, teaching, family meetings, and any prior time recorded by providers other than myself  ELIZABETH Avila      Portions of the record may have been created with voice recognition software  Occasional wrong word or "sound a like" substitutions may have occurred due to the inherent limitations of voice recognition software    Read the chart carefully and recognize, using context, where substitutions have occurred

## 2022-12-09 NOTE — RESPIRATORY THERAPY NOTE
12/09/22 5449   Non-Invasive Information   O2 Interface Device HFNC prongs   Non-Invasive Ventilation Mode HFNC (High flow)   SpO2 92 %   $ Pulse Oximetry Spot Check Charge Completed   Non-Invasive Settings   FiO2 (%) (S)  50   Flow (lpm) 55   Temperature (Set) 31   Non-Invasive Readings   Skin Intervention Skin intact   Heater Temperature (Obs) 30 9   Maintenance   Water bag changed Yes

## 2022-12-10 LAB
ANION GAP SERPL CALCULATED.3IONS-SCNC: 6 MMOL/L (ref 4–13)
APTT PPP: 80 SECONDS (ref 23–37)
BUN SERPL-MCNC: 33 MG/DL (ref 5–25)
CALCIUM SERPL-MCNC: 8.9 MG/DL (ref 8.3–10.1)
CHLORIDE SERPL-SCNC: 102 MMOL/L (ref 96–108)
CO2 SERPL-SCNC: 33 MMOL/L (ref 21–32)
CREAT SERPL-MCNC: 0.87 MG/DL (ref 0.6–1.3)
ERYTHROCYTE [DISTWIDTH] IN BLOOD BY AUTOMATED COUNT: 14.8 % (ref 11.6–15.1)
GFR SERPL CREATININE-BSD FRML MDRD: 99 ML/MIN/1.73SQ M
GLUCOSE SERPL-MCNC: 162 MG/DL (ref 65–140)
HCT VFR BLD AUTO: 46.9 % (ref 36.5–49.3)
HGB BLD-MCNC: 14.2 G/DL (ref 12–17)
MAGNESIUM SERPL-MCNC: 2 MG/DL (ref 1.6–2.6)
MCH RBC QN AUTO: 25 PG (ref 26.8–34.3)
MCHC RBC AUTO-ENTMCNC: 30.3 G/DL (ref 31.4–37.4)
MCV RBC AUTO: 83 FL (ref 82–98)
PHOSPHATE SERPL-MCNC: 3.5 MG/DL (ref 2.7–4.5)
PLATELET # BLD AUTO: 146 THOUSANDS/UL (ref 149–390)
PMV BLD AUTO: 10.6 FL (ref 8.9–12.7)
POTASSIUM SERPL-SCNC: 4.7 MMOL/L (ref 3.5–5.3)
RBC # BLD AUTO: 5.68 MILLION/UL (ref 3.88–5.62)
SODIUM SERPL-SCNC: 141 MMOL/L (ref 135–147)
WBC # BLD AUTO: 8.53 THOUSAND/UL (ref 4.31–10.16)

## 2022-12-10 RX ADMIN — ISODIUM CHLORIDE 3 ML: 0.03 SOLUTION RESPIRATORY (INHALATION) at 22:12

## 2022-12-10 RX ADMIN — ATORVASTATIN CALCIUM 10 MG: 10 TABLET, FILM COATED ORAL at 16:03

## 2022-12-10 RX ADMIN — DEXAMETHASONE SODIUM PHOSPHATE 18.4 MG: 10 INJECTION, SOLUTION INTRAMUSCULAR; INTRAVENOUS at 19:00

## 2022-12-10 RX ADMIN — DEXAMETHASONE SODIUM PHOSPHATE 18.4 MG: 10 INJECTION, SOLUTION INTRAMUSCULAR; INTRAVENOUS at 08:12

## 2022-12-10 RX ADMIN — ISODIUM CHLORIDE 3 ML: 0.03 SOLUTION RESPIRATORY (INHALATION) at 07:11

## 2022-12-10 RX ADMIN — METOPROLOL TARTRATE 50 MG: 50 TABLET, FILM COATED ORAL at 21:06

## 2022-12-10 RX ADMIN — SILDENAFIL 20 MG: 20 TABLET ORAL at 21:06

## 2022-12-10 RX ADMIN — HEPARIN SODIUM 20 UNITS/KG/HR: 10000 INJECTION, SOLUTION INTRAVENOUS at 00:05

## 2022-12-10 RX ADMIN — BARICITINIB 4 MG: 2 TABLET, FILM COATED ORAL at 08:12

## 2022-12-10 RX ADMIN — METOPROLOL TARTRATE 50 MG: 50 TABLET, FILM COATED ORAL at 08:12

## 2022-12-10 RX ADMIN — LEVALBUTEROL HYDROCHLORIDE 1.25 MG: 1.25 SOLUTION, CONCENTRATE RESPIRATORY (INHALATION) at 13:22

## 2022-12-10 RX ADMIN — SILDENAFIL 20 MG: 20 TABLET ORAL at 08:12

## 2022-12-10 RX ADMIN — LEVALBUTEROL HYDROCHLORIDE 1.25 MG: 1.25 SOLUTION, CONCENTRATE RESPIRATORY (INHALATION) at 22:12

## 2022-12-10 RX ADMIN — ISODIUM CHLORIDE 3 ML: 0.03 SOLUTION RESPIRATORY (INHALATION) at 13:23

## 2022-12-10 RX ADMIN — SILDENAFIL 20 MG: 20 TABLET ORAL at 16:03

## 2022-12-10 RX ADMIN — HEPARIN SODIUM 20 UNITS/KG/HR: 10000 INJECTION, SOLUTION INTRAVENOUS at 11:46

## 2022-12-10 RX ADMIN — LEVALBUTEROL HYDROCHLORIDE 1.25 MG: 1.25 SOLUTION, CONCENTRATE RESPIRATORY (INHALATION) at 07:11

## 2022-12-10 NOTE — RESPIRATORY THERAPY NOTE
12/09/22 2033   Respiratory Assessment   Assessment Type Pre-treatment   General Appearance Drowsy   Respiratory Pattern Normal   Chest Assessment Chest expansion symmetrical   Bilateral Breath Sounds Diminished;Clear   Resp Comments patient remains on HFNC, no changes made at this time   WIll continue with current settings, pt to go on BiPAP for HS   O2 Device opti   Non-Invasive Information   O2 Interface Device HFNC prongs   Non-Invasive Ventilation Mode HFNC (High flow)   $ Pulse Oximetry Spot Check Charge Completed   Non-Invasive Settings   FiO2 (%) 50   Flow (lpm) 55   Temperature (Set) 31   Non-Invasive Readings   Heater Temperature (Obs) 30 9

## 2022-12-10 NOTE — RESPIRATORY THERAPY NOTE
RT Ventilator Management Note  [place] 46 y o  male MRN: [id number]  Unit/Bed#:  Encounter: [id number]      Daily Screen    No data found in the last 10 encounters             Physical Exam:   Assessment Type: Assess only  General Appearance: Sleeping  Respiratory Pattern: Normal  Chest Assessment: Chest expansion symmetrical  Bilateral Breath Sounds: Diminished, Clear  O2 Device: v60      Resp Comments: placed pt on hfnc       12/10/22 0711   Respiratory Assessment   Resp Comments placed pt on hfnc   Non-Invasive Information   O2 Interface Device HFNC prongs   Non-Invasive Ventilation Mode HFNC (High flow)   SpO2 (!) 88 %   $ Pulse Oximetry Spot Check Charge Completed   Non-Invasive Settings   FiO2 (%) 60   Flow (lpm) (S)  60   Temperature (Set) 31   Non-Invasive Readings   Skin Intervention Skin intact   Heater Temperature (Obs) 31

## 2022-12-10 NOTE — ASSESSMENT & PLAN NOTE
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

## 2022-12-10 NOTE — ASSESSMENT & PLAN NOTE
· Infectious markers at presentation:  · CRP 20 0  · Now down to < 3  · Trend Q2 days  · D-Dimer 3 50  · Started on heparin gtt for AFib  · PE study on arrival negative  · LE Duplex negative  · D-Dimer now 0 86  · Oxygen: HFNC 50% 55L  · Pathway: Very severe  · Steroids: Decadron 0 1 mg/kg IV Q12 Day 9/10  · Can stop if clinical improvement earlier  · Baricitinib: Ordered for day 5 but only has received 3 doses given midnight timing and patient on BiPAP  · Changed timing to QPM  · If on RA for 24 hours can D/C  · Antibiotics  · None  · Procal <0 05  · Anticoagulation  · Hep gtt for AFib  · Remdesivir: Completed  · Discussed importance of self proning  · OOB TID, early mobilization

## 2022-12-10 NOTE — PROGRESS NOTES
[address] [facility]  Progress Note Miko  1970, 46 y o  male MRN: [id number]  Unit/Bed#:  Encounter: [id number]  Primary Care Provider: No primary care provider on file  Date and time admitted to hospital: 12/1/2022  5:52 PM    * Acute respiratory failure due to COVID-19 [facility])  Assessment & Plan  · Infectious markers at presentation:  · CRP 20 0  · Now down to < 3  · Trend Q2 days  · D-Dimer 3 50  · Started on heparin gtt for AFib  · PE study on arrival negative  · LE Duplex negative  · D-Dimer now 0 86  · Oxygen: HFNC 50% 55L  · Pathway: Very severe  · Steroids: Decadron 0 1 mg/kg IV Q12 Day 9/10  · Can stop if clinical improvement earlier  · Baricitinib: Ordered for day 5 but only has received 3 doses given midnight timing and patient on BiPAP  · Changed timing to QPM  · If on RA for 24 hours can D/C  · Antibiotics  · None  · Procal <0 05  · Anticoagulation  · Hep gtt for AFib  · Remdesivir: Completed  · Discussed importance of self proning  · OOB TID, early mobilization     Elevated serum creatinine  Assessment & Plan  · Now back at baseline  · Continue to monitor I&O and renal indices  · Holding diuresis as patient appears euvolemic    Pulmonary hypertension (HCC)  Assessment & Plan  Continue sildenafil   Epoprostenol weaned to off    Obstructive sleep apnea  Assessment & Plan  BiPAP at [facility]  Patient 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  Would benefit from sleep study as outpatient      Morbid obesity ([facility] [address] )  Assessment & Plan  Nutrition consulted  Encourage lifestyle modifications    Mixed hyperlipidemia  Assessment & Plan  Continue home statin    CHF (congestive heart failure) (HCC)  Assessment & Plan  Wt Readings from Last 3 Encounters:   12/04/22 (!) 178 kg (391 lb 8 6 oz)     · Hold lasix given euvolemic state, contraction alkalemia; Continue negative fluid balance  · Daily weights  · Epoprostenol weaned to off via minimization protocol, monitor closely for rebound hypoxia,  Continue to wean oxygen   · Continue sildenafil    ----------------------------------------------------------------------------------------  HPI/24hr events: Patient remains on HFNC during the day and BIPAP at HS  HFNC has been weaned down to 55 L 50%  No overnight events  Patient appropriate for transfer out of the ICU today?: No  Disposition: Continue Stepdown Level 1 level of care   Code Status: Level 1 - Full Code  ---------------------------------------------------------------------------------------  SUBJECTIVE  "I'm doing okay "    Review of Systems   Respiratory: Negative for shortness of breath  Cardiovascular: Negative for chest pain  All other systems reviewed and are negative  Review of systems was reviewed and negative unless stated above in HPI/24-hour events   ---------------------------------------------------------------------------------------  OBJECTIVE    Vitals   Vitals:    22 1327 22 1845 12/09/22 2000 12/10/22 0005   BP:  140/96 (!) 154/104 (!) 158/110   BP Location:  Right arm     Pulse:  (!) 109 99 88   Resp:  19 18 (!) 8   Temp:  97 5 °F (36 4 °C)  97 8 °F (36 6 °C)   TempSrc:  Oral     SpO2: 92% 92% 95% 90%   Weight:       Height:         Temp (24hrs), Av 9 °F (36 6 °C), Min:97 5 °F (36 4 °C), Max:98 4 °F (36 9 °C)  Current: Temperature: 97 8 °F (36 6 °C)  Arterial Line BP: 151/101  Arterial Line MAP (mmHg): 121 mmHg    Respiratory:  SpO2: SpO2: 90 %  Nasal Cannula O2 Flow Rate (L/min): 50 L/min    Invasive/non-invasive ventilation settings   Respiratory    Lab Data (Last 4 hours)    None         O2/Vent Data (Last 4 hours)      12/10 0413          Non-Invasive Ventilation Mode BiPAP                   Physical Exam  Constitutional:       Appearance: He is obese  He is ill-appearing  HENT:      Head: Normocephalic and atraumatic        Mouth/Throat:      Mouth: Mucous membranes are moist    Eyes: Conjunctiva/sclera: Conjunctivae normal    Cardiovascular:      Rate and Rhythm: Normal rate  Rhythm irregular  Pulses: Normal pulses  Heart sounds: Normal heart sounds  Pulmonary:      Effort: Pulmonary effort is normal  No respiratory distress  Breath sounds: Decreased breath sounds present  Abdominal:      General: Bowel sounds are normal  There is no distension  Palpations: Abdomen is soft  Tenderness: There is no abdominal tenderness  Musculoskeletal:      Right lower leg: No edema  Left lower leg: No edema  Skin:     General: Skin is warm and dry  Comments: PVD discoloration B/L LE   Neurological:      General: No focal deficit present  Mental Status: He is alert and oriented to person, place, and time  Mental status is at baseline               Laboratory and Diagnostics:  Results from last 7 days   Lab Units 12/10/22  0311 12/09/22  0531 12/08/22  0526 12/06/22  0436 12/05/22  1109 12/05/22  0440 12/04/22  0556   WBC Thousand/uL 8 53 7 61 6 12 7 41 10 41* 9 95 9 53   HEMOGLOBIN g/dL 14 2 13 8 13 6 13 5 14 0 13 5 13 8   HEMATOCRIT % 46 9 46 5 46 9 47 6 49 3 48 4 49 1   PLATELETS Thousands/uL 146* 140* 150 179 219 206 229   NEUTROS PCT %  --  91* 88* 90*  --  91* 89*   MONOS PCT %  --  3* 5 4  --  4 4     Results from last 7 days   Lab Units 12/10/22  0311 12/08/22  0526 12/07/22  0434 12/06/22  0436 12/06/22  0015 12/05/22  1800 12/05/22  1109 12/05/22  0440 12/04/22  1158 12/04/22  0556   SODIUM mmol/L 141 143 144 143 144 150* 150* 148*   < > 149*   POTASSIUM mmol/L 4 7 4 6 4 4 4 1 4 1 4 0 4 2 4 3   < > 4 7   CHLORIDE mmol/L 102 103 103 103 102 103 104 105   < > 103   CO2 mmol/L 33* 36* 37* 38* 40* 40* 39* 34*   < > 40*   ANION GAP mmol/L 6 4 4 2* 2* 7 7 9   < > 6   BUN mg/dL 33* 34* 35* 35* 37* 39* 41* 43*   < > 39*   CREATININE mg/dL 0 87 0 86 0 90 0 93 0 95 1 06 1 15 1 18   < > 1 32*   CALCIUM mg/dL 8 9 8 8 8 9 9 0 8 7 9 1 9 2 9 1   < > 9 1   GLUCOSE RANDOM mg/dL 162* 154* 156* 162* 143* 174* 150* 153*   < > 164*   ALT U/L  --  30 32  --   --   --   --  27  --  32   AST U/L  --  10 16  --   --   --   --  25  --  36   ALK PHOS U/L  --  39* 44*  --   --   --   --  45*  --  49   ALBUMIN g/dL  --  2 9* 3 0*  --   --   --   --  3 1*  --  3 2*   TOTAL BILIRUBIN mg/dL  --  1 06* 1 01*  --   --   --   --  0 70  --  0 52    < > = values in this interval not displayed  Results from last 7 days   Lab Units 12/10/22  0311 12/08/22  0526 12/07/22  0434 12/06/22  0436 12/05/22  0440 12/04/22  0556   MAGNESIUM mg/dL 2 0 2 2 2 1 2 5 2 3 2 2   PHOSPHORUS mg/dL 3 5 3 7 3 9 3 9 4 2 4 6*      Results from last 7 days   Lab Units 12/10/22  0311 12/09/22  1908 12/09/22  1159 12/09/22  0531 12/08/22  0526 12/07/22  0434 12/06/22  2232 12/05/22  1800 12/05/22  1109   INR   --   --   --   --   --   --   --   --  1 23*   PTT seconds 80* 78* 55* 93* 67* 75* 76*   < > 26    < > = values in this interval not displayed  ABG:  Results from last 7 days   Lab Units 12/08/22  1619   PH ART  7 384   PCO2 ART mm Hg 66 1*   PO2 ART mm Hg 66 5*   HCO3 ART mmol/L 38 6*   BASE EXC ART mmol/L 10 5   ABG SOURCE  Line, Arterial     VBG:  Results from last 7 days   Lab Units 12/08/22  1619 12/03/22  1435 12/03/22  1054   PH HERNESTO   --   --  7 250*   PCO2 HERNESTO mm Hg  --   --  98 1*   PO2 HERNESTO mm Hg  --   --  63 7*   HCO3 HERNESTO mmol/L  --   --  42 1*   BASE EXC HERNESTO mmol/L  --   --  10 2   ABG SOURCE  Line, Arterial   < >  --     < > = values in this interval not displayed  Results from last 7 days   Lab Units 12/06/22  0436   PROCALCITONIN ng/ml <0 05       Micro        EKG: Afib on tele rate 80s-100s  Imaging: I have personally reviewed pertinent reports  Intake and Output  I/O       12/08 0701  12/09 0700 12/09 0701  12/10 0700    P  O  960     I V  (mL/kg) 427 5 (2 4) 280 2 (1 6)    Total Intake(mL/kg) 1387 5 (7 8) 280 2 (1 6)    Urine (mL/kg/hr) 1775 (0 4) 600 (0 1)    Total Output 1775 600 Net -387 5 -048 8                Height and Weights   Height: 5' 11" (180 3 cm)  IBW (Ideal Body Weight): 75 3 kg  Body mass index is 54 61 kg/m²  Weight (last 2 days)     None            Nutrition       Diet Orders   (From admission, onward)             Start     Ordered    12/06/22 1103  Diet Cardiovascular; Cardiac; Fluid Restriction 1500 ML, Consistent Carbohydrate Diet Level 2 (5 carb servings/75 grams CHO/meal)  Diet effective now        References:    Nutrtion Support Algorithm Enteral vs  Parenteral   Question Answer Comment   Diet Type Cardiovascular    Cardiac Cardiac    Other Restriction(s): Fluid Restriction 1500 ML    Other Restriction(s): Consistent Carbohydrate Diet Level 2 (5 carb servings/75 grams CHO/meal)    RD to adjust diet per protocol?  Yes        12/06/22 1105                  Active Medications  Scheduled Meds:  Current Facility-Administered Medications   Medication Dose Route Frequency Provider Last Rate   • acetaminophen  650 mg Oral Q6H PRN Terryell Lick ELIZABETH Meléndez     • albuterol  2 puff Inhalation Q4H PRN Erin Lick ELIZABETH Meléndez     • atorvastatin  10 mg Oral Daily With [employer] ELIZABETH Meléndez     • baricitinib  4 mg Oral Q24H Ez Sahu PA-C     • dexamethasone  0 1 mg/kg Intravenous Q12H ELIZABETH [place] 18 4 mg (12/09/22 1906)   • dextromethorphan-guaiFENesin  10 mL Oral Q4H PRN Terryell Lick ELIZABETH Meléndez     • heparin (porcine)  3-20 Units/kg/hr (Order-Specific) Intravenous Titrated Ez Oats, PA-C 20 Units/kg/hr (12/10/22 0005)   • heparin (porcine)  2,000 Units Intravenous Q1H PRN Ez Oats, PA-C     • heparin (porcine)  4,000 Units Intravenous Q1H PRN Ez Oats, PA-C     • levalbuterol  1 25 mg Nebulization TID ELIZABETH Carson     • metoprolol tartrate  50 mg Oral Q12H [address] [place] [address], ELIZABETH     • sildenafil  20 mg Oral TID Terryell Lick ELIZABETH Meléndez     • sodium chloride  3 mL Nebulization TID ELIZABETH Isaacs       Continuous Infusions:  heparin (porcine), 3-20 Units/kg/hr (Order-Specific), Last Rate: 20 Units/kg/hr (12/10/22 0005)      PRN Meds:   acetaminophen, 650 mg, Q6H PRN  albuterol, 2 puff, Q4H PRN  dextromethorphan-guaiFENesin, 10 mL, Q4H PRN  heparin (porcine), 2,000 Units, Q1H PRN  heparin (porcine), 4,000 Units, Q1H PRN        Invasive Devices Review  Invasive Devices     Peripheral Intravenous Line  Duration           Peripheral IV Distal;Left;Upper Forearm -- days    Peripheral IV 12/03/22 Left;Ventral (anterior) Forearm 6 days    Peripheral IV 12/08/22 Dorsal (posterior); Right Hand 1 day                Rationale for remaining devices: N/A  ---------------------------------------------------------------------------------------  Advance Directive and Living Will:      Power of :    POLST:    ---------------------------------------------------------------------------------------  Care Time Delivered:   No Critical Care time spent       ELIZABETH Isaacs      Portions of the record may have been created with voice recognition software  Occasional wrong word or "sound a like" substitutions may have occurred due to the inherent limitations of voice recognition software    Read the chart carefully and recognize, using context, where substitutions have occurred

## 2022-12-10 NOTE — RESPIRATORY THERAPY NOTE
12/10/22 0413   Respiratory Assessment   Assessment Type Assess only   General Appearance Sleeping   Respiratory Pattern Normal   Chest Assessment Chest expansion symmetrical   Bilateral Breath Sounds Diminished;Clear   Resp Comments pt resting comfortably on Bipap   O2 Device v60   Non-Invasive Information   O2 Interface Device Face mask   Non-Invasive Ventilation Mode BiPAP   $ Pulse Oximetry Spot Check Charge Completed   Non-Invasive Settings   IPAP (cm) 25 cm   EPAP (cm) 15 cm   Rate (Set) 10   FiO2 (%) 60   Pressure Support (cm H2O) 10   Rise Time 2   Inspiratory Time (Set) 0 8   Non-Invasive Readings   Skin Intervention Skin intact   Total Rate 20   Peak Pressure (Obs) 27   Spontaneous Vt (mL) 751   Leak (lpm) 20   Spontaneous MV (mL) 14 2   Non-Invasive Alarms   Insp Pressure High (cm H20) 35   Insp Pressure Low (cm H20) 8   Low Insp Pressure Time (sec) 20 sec   MV Low (L/min) 3   Vt High (mL) 1600   Vt Low (mL) 300   High Resp Rate (BPM) 35 BPM   Low Resp Rate (BPM) 8 BPM   Apnea Interval (sec) 20   Apnea Rate 10   Apnea Pressure 25   Maintenance   Water bag changed No

## 2022-12-11 LAB
ANION GAP SERPL CALCULATED.3IONS-SCNC: 4 MMOL/L (ref 4–13)
APTT PPP: 24 SECONDS (ref 23–37)
APTT PPP: 53 SECONDS (ref 23–37)
APTT PPP: 83 SECONDS (ref 23–37)
BUN SERPL-MCNC: 29 MG/DL (ref 5–25)
CALCIUM SERPL-MCNC: 8.7 MG/DL (ref 8.3–10.1)
CHLORIDE SERPL-SCNC: 101 MMOL/L (ref 96–108)
CO2 SERPL-SCNC: 32 MMOL/L (ref 21–32)
CREAT SERPL-MCNC: 0.82 MG/DL (ref 0.6–1.3)
ERYTHROCYTE [DISTWIDTH] IN BLOOD BY AUTOMATED COUNT: 14.8 % (ref 11.6–15.1)
GFR SERPL CREATININE-BSD FRML MDRD: 101 ML/MIN/1.73SQ M
GLUCOSE SERPL-MCNC: 158 MG/DL (ref 65–140)
HCT VFR BLD AUTO: 45.6 % (ref 36.5–49.3)
HGB BLD-MCNC: 13.8 G/DL (ref 12–17)
MCH RBC QN AUTO: 24.8 PG (ref 26.8–34.3)
MCHC RBC AUTO-ENTMCNC: 30.3 G/DL (ref 31.4–37.4)
MCV RBC AUTO: 82 FL (ref 82–98)
PLATELET # BLD AUTO: 147 THOUSANDS/UL (ref 149–390)
PMV BLD AUTO: 11.3 FL (ref 8.9–12.7)
POTASSIUM SERPL-SCNC: 4.6 MMOL/L (ref 3.5–5.3)
RBC # BLD AUTO: 5.57 MILLION/UL (ref 3.88–5.62)
SODIUM SERPL-SCNC: 137 MMOL/L (ref 135–147)
WBC # BLD AUTO: 8.05 THOUSAND/UL (ref 4.31–10.16)

## 2022-12-11 RX ADMIN — ISODIUM CHLORIDE 3 ML: 0.03 SOLUTION RESPIRATORY (INHALATION) at 20:52

## 2022-12-11 RX ADMIN — BARICITINIB 4 MG: 2 TABLET, FILM COATED ORAL at 08:50

## 2022-12-11 RX ADMIN — ATORVASTATIN CALCIUM 10 MG: 10 TABLET, FILM COATED ORAL at 17:16

## 2022-12-11 RX ADMIN — SILDENAFIL 20 MG: 20 TABLET ORAL at 08:50

## 2022-12-11 RX ADMIN — SILDENAFIL 20 MG: 20 TABLET ORAL at 17:16

## 2022-12-11 RX ADMIN — ISODIUM CHLORIDE 3 ML: 0.03 SOLUTION RESPIRATORY (INHALATION) at 13:10

## 2022-12-11 RX ADMIN — HEPARIN SODIUM 22 UNITS/KG/HR: 10000 INJECTION, SOLUTION INTRAVENOUS at 17:16

## 2022-12-11 RX ADMIN — LEVALBUTEROL HYDROCHLORIDE 1.25 MG: 1.25 SOLUTION, CONCENTRATE RESPIRATORY (INHALATION) at 20:52

## 2022-12-11 RX ADMIN — DEXAMETHASONE SODIUM PHOSPHATE 18.4 MG: 10 INJECTION, SOLUTION INTRAMUSCULAR; INTRAVENOUS at 08:49

## 2022-12-11 RX ADMIN — ISODIUM CHLORIDE 3 ML: 0.03 SOLUTION RESPIRATORY (INHALATION) at 07:41

## 2022-12-11 RX ADMIN — METOPROLOL TARTRATE 50 MG: 50 TABLET, FILM COATED ORAL at 08:50

## 2022-12-11 RX ADMIN — HEPARIN SODIUM 20 UNITS/KG/HR: 10000 INJECTION, SOLUTION INTRAVENOUS at 08:51

## 2022-12-11 RX ADMIN — METOPROLOL TARTRATE 50 MG: 50 TABLET, FILM COATED ORAL at 21:03

## 2022-12-11 RX ADMIN — LEVALBUTEROL HYDROCHLORIDE 1.25 MG: 1.25 SOLUTION, CONCENTRATE RESPIRATORY (INHALATION) at 13:10

## 2022-12-11 RX ADMIN — SILDENAFIL 20 MG: 20 TABLET ORAL at 21:03

## 2022-12-11 RX ADMIN — LEVALBUTEROL HYDROCHLORIDE 1.25 MG: 1.25 SOLUTION, CONCENTRATE RESPIRATORY (INHALATION) at 07:41

## 2022-12-11 RX ADMIN — DEXAMETHASONE SODIUM PHOSPHATE 18.4 MG: 10 INJECTION, SOLUTION INTRAMUSCULAR; INTRAVENOUS at 19:28

## 2022-12-11 RX ADMIN — HEPARIN SODIUM 2000 UNITS: 1000 INJECTION INTRAVENOUS; SUBCUTANEOUS at 14:06

## 2022-12-11 NOTE — ASSESSMENT & PLAN NOTE
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

## 2022-12-11 NOTE — RESPIRATORY THERAPY NOTE
12/10/22 7233   Respiratory Assessment   Resp Comments placed pt on NIV for HS use   O2 Device v60   Non-Invasive Information   O2 Interface Device Face mask   Non-Invasive Ventilation Mode BiPAP   SpO2 92 %   $ Pulse Oximetry Spot Check Charge Completed   Non-Invasive Settings   IPAP (cm) 25 cm   EPAP (cm) 15 cm   Rate (Set) 10   FiO2 (%) 60   Rise Time 2  (with 10 min ramp applied)   Inspiratory Time (Set) 0 8   Humidification   (heater)   Temperature (Set) 31   Non-Invasive Readings   Skin Intervention Skin intact   Total Rate 18   Vt (mL) (Mech) 1009   MV (Mech) 18 5   Peak Pressure (Obs) 19   Heater Temperature (Obs)   (unit warming)   Leak (lpm) 20   Non-Invasive Alarms   Insp Pressure High (cm H20) 25   Insp Pressure Low (cm H20) 8   Low Insp Pressure Time (sec) 20 sec   MV Low (L/min) 3   Vt High (mL) 1350   Vt Low (mL) 300   High Resp Rate (BPM) 35 BPM   Low Resp Rate (BPM) 8 BPM   Apnea Interval (sec) 20   Maintenance   Water bag changed No

## 2022-12-11 NOTE — RESPIRATORY THERAPY NOTE
RT Ventilator Management Note  Josefa Villagran 46 y o  male MRN: [id number]  Unit/Bed#:  Encounter: [id number]      Daily Screen    No data found in the last 10 encounters             Physical Exam:   General Appearance: Awake  Respiratory Pattern: Assisted, Spontaneous  Chest Assessment: Chest expansion symmetrical  O2 Device: opti flow      Resp Comments: pt requesting to come off NIV, placed pt back on HF at this time       12/11/22 1150   Non-Invasive Information   O2 Interface Device HFNC prongs   Non-Invasive Ventilation Mode HFNC (High flow)   SpO2 92 %   $ Pulse Oximetry Spot Check Charge Completed   Non-Invasive Settings   FiO2 (%) 60   Flow (lpm) 60   Maintenance   Water bag changed No

## 2022-12-11 NOTE — RESPIRATORY THERAPY NOTE
RT Ventilator Management Note  Mira Cazares 46 y o  male MRN: [id number]  Unit/Bed#:  Encounter: [id number]      Daily Screen    No data found in the last 10 encounters             Physical Exam:   General Appearance: Awake  Respiratory Pattern: Assisted, Spontaneous  Chest Assessment: Chest expansion symmetrical  Bilateral Breath Sounds: Diminished  O2 Device: opti flow      Resp Comments: pt requesting to come off NIV, placed pt back on HF at this time       12/11/22 0741   Non-Invasive Information   O2 Interface Device HFNC prongs   Non-Invasive Ventilation Mode HFNC (High flow)   SpO2 91 %   $ Pulse Oximetry Spot Check Charge Completed   Non-Invasive Settings   FiO2 (%) 60   Flow (lpm) 60   Temperature (Set) 31   Non-Invasive Readings   Skin Intervention Skin intact   Heater Temperature (Obs) 30 9   Maintenance   Water bag changed Yes

## 2022-12-11 NOTE — RESPIRATORY THERAPY NOTE
12/11/22 0226   Respiratory Assessment   General Appearance Sleeping   Respiratory Pattern Assisted;Spontaneous   Chest Assessment Chest expansion symmetrical   O2 Device v60 Eleven   Non-Invasive Information   O2 Interface Device Face mask   Non-Invasive Ventilation Mode BiPAP   $ Intermittent NIV Yes   SpO2 96 %   $ Pulse Oximetry Spot Check Charge Completed   Non-Invasive Settings   IPAP (cm) 25 cm   EPAP (cm) 15 cm   Rate (Set) 10   FiO2 (%) 60   Rise Time 2   Inspiratory Time (Set) 0 8   Humidification   (heater)   Temperature (Set) 31   Non-Invasive Readings   Skin Intervention Skin intact   Total Rate 17   Vt (mL) (Mech) 774   MV (Mech) 13 4   Peak Pressure (Obs) 26   Heater Temperature (Obs) 37 1   Leak (lpm) 4   Non-Invasive Alarms   Insp Pressure High (cm H20) 25   Insp Pressure Low (cm H20) 8   Low Insp Pressure Time (sec) 20 sec   MV Low (L/min) 3   Vt High (mL) 1350   Vt Low (mL) 300   High Resp Rate (BPM) 35 BPM   Low Resp Rate (BPM) 8 BPM   Apnea Interval (sec) 20   Maintenance   Water bag changed No

## 2022-12-11 NOTE — PLAN OF CARE
Problem: Potential for Falls  Goal: Patient will remain free of falls  Description: INTERVENTIONS:  - Educate patient/family on patient safety including physical limitations  - Instruct patient to call for assistance with activity   - Consult OT/PT to assist with strengthening/mobility   - Keep Call bell within reach  - Keep bed low and locked with side rails adjusted as appropriate  - Keep care items and personal belongings within reach  - Initiate and maintain comfort rounds  - Make Fall Risk Sign visible to staff  - Offer Toileting every 2 Hours, in advance of need  - Initiate/Maintain murray alarm  - Obtain necessary fall risk management equipment:   - Apply yellow socks and bracelet for high fall risk patients  - Consider moving patient to room near nurses station  Outcome: Progressing     Problem: Nutrition/Hydration-ADULT  Goal: Nutrient/Hydration intake appropriate for improving, restoring or maintaining nutritional needs  Description: Monitor and assess patient's nutrition/hydration status for malnutrition  Collaborate with interdisciplinary team and initiate plan and interventions as ordered  Monitor patient's weight and dietary intake as ordered or per policy  Utilize nutrition screening tool and intervene as necessary  Determine patient's food preferences and provide high-protein, high-caloric foods as appropriate       INTERVENTIONS:  - Monitor oral intake, urinary output, labs, and treatment plans  - Assess nutrition and hydration status and recommend course of action  - Evaluate amount of meals eaten  - Assist patient with eating if necessary   - Allow adequate time for meals  - Recommend/ encourage appropriate diets, oral nutritional supplements, and vitamin/mineral supplements  - Order, calculate, and assess calorie counts as needed  - Recommend, monitor, and adjust tube feedings and TPN/PPN based on assessed needs  - Assess need for intravenous fluids  - Provide specific nutrition/hydration education as appropriate  - Include patient/family/caregiver in decisions related to nutrition  Outcome: Progressing     Problem: MOBILITY - ADULT  Goal: Maintain or return to baseline ADL function  Description: INTERVENTIONS:  -  Assess patient's ability to carry out ADLs; assess patient's baseline for ADL function and identify physical deficits which impact ability to perform ADLs (bathing, care of mouth/teeth, toileting, grooming, dressing, etc )  - Assess/evaluate cause of self-care deficits   - Assess range of motion  - Assess patient's mobility; develop plan if impaired  - Assess patient's need for assistive devices and provide as appropriate  - Encourage maximum independence but intervene and supervise when necessary  - Involve family in performance of ADLs  - Assess for home care needs following discharge   - Consider OT consult to assist with ADL evaluation and planning for discharge  - Provide patient education as appropriate  Outcome: Progressing  Goal: Maintains/Returns to pre admission functional level  Description: INTERVENTIONS:  - Perform BMAT or MOVE assessment daily    - Set and communicate daily mobility goal to care team and patient/family/caregiver  - Collaborate with rehabilitation services on mobility goals if consulted  - Perform Range of Motion 3 times a day  - Reposition patient every 2 hours    - Dangle patient 3 times a day  - Stand patient 3 times a day  - Ambulate patient 3 times a day  - Out of bed to chair 3 times a day   - Out of bed for meals 3 times a day  - Out of bed for toileting  - Record patient progress and toleration of activity level   Outcome: Progressing     Problem: Prexisting or High Potential for Compromised Skin Integrity  Goal: Skin integrity is maintained or improved  Description: INTERVENTIONS:  - Identify patients at risk for skin breakdown  - Assess and monitor skin integrity  - Assess and monitor nutrition and hydration status  - Monitor labs   - Assess for incontinence   - Turn and reposition patient  - Assist with mobility/ambulation  - Relieve pressure over bony prominences  - Avoid friction and shearing  - Provide appropriate hygiene as needed including keeping skin clean and dry  - Evaluate need for skin moisturizer/barrier cream  - Collaborate with interdisciplinary team   - Patient/family teaching  - Consider wound care consult   Outcome: Progressing     Problem: CARDIOVASCULAR - ADULT  Goal: Maintains optimal cardiac output and hemodynamic stability  Description: INTERVENTIONS:  - Monitor I/O, vital signs and rhythm  - Monitor for S/S and trends of decreased cardiac output  - Administer and titrate ordered vasoactive medications to optimize hemodynamic stability  - Assess quality of pulses, skin color and temperature  - Assess for signs of decreased coronary artery perfusion  - Instruct patient to report change in severity of symptoms  Outcome: Progressing  Goal: Absence of cardiac dysrhythmias or at baseline rhythm  Description: INTERVENTIONS:  - Continuous cardiac monitoring, vital signs, obtain 12 lead EKG if ordered  - Administer antiarrhythmic and heart rate control medications as ordered  - Monitor electrolytes and administer replacement therapy as ordered  Outcome: Progressing     Problem: RESPIRATORY - ADULT  Goal: Achieves optimal ventilation and oxygenation  Description: INTERVENTIONS:  - Assess for changes in respiratory status  - Assess for changes in mentation and behavior  - Position to facilitate oxygenation and minimize respiratory effort  - Oxygen administered by appropriate delivery if ordered  - Initiate smoking cessation education as indicated  - Encourage broncho-pulmonary hygiene including cough, deep breathe, Incentive Spirometry  - Assess the need for suctioning and aspirate as needed  - Assess and instruct to report SOB or any respiratory difficulty  - Respiratory Therapy support as indicated  Outcome: Progressing     Problem: GENITOURINARY - ADULT  Goal: Urinary catheter remains patent  Description: INTERVENTIONS:  - Assess patency of urinary catheter  - If patient has a chronic diallo, consider changing catheter if non-functioning  - Follow guidelines for intermittent irrigation of non-functioning urinary catheter  Outcome: Progressing     Problem: METABOLIC, FLUID AND ELECTROLYTES - ADULT  Goal: Electrolytes maintained within normal limits  Description: INTERVENTIONS:  - Monitor labs and assess patient for signs and symptoms of electrolyte imbalances  - Administer electrolyte replacement as ordered  - Monitor response to electrolyte replacements, including repeat lab results as appropriate  - Instruct patient on fluid and nutrition as appropriate  Outcome: Progressing  Goal: Glucose maintained within target range  Description: INTERVENTIONS:  - Monitor Blood Glucose as ordered  - Assess for signs and symptoms of hyperglycemia and hypoglycemia  - Administer ordered medications to maintain glucose within target range  - Assess nutritional intake and initiate nutrition service referral as needed  Outcome: Progressing

## 2022-12-11 NOTE — PROGRESS NOTES
[facility]  Progress Note [facility] 1970, 46 y o  male MRN: [id number]  Unit/Bed#:  Encounter: [id number]  Primary Care Provider: No primary care provider on file  Date and time admitted to hospital: 12/1/2022  5:52 PM    * Acute respiratory failure due to COVID-19 [facility])  Assessment & Plan  · Infectious markers at presentation:  · CRP 20 0  · Now down to < 3  · Trend Q2 days  · D-Dimer 3 50  · Started on heparin gtt for AFib  · PE study on arrival negative  · LE Duplex negative  · D-Dimer now 0 86  · Oxygen: HFNC 50% 55L  · Pathway: Very severe  · Steroids: Decadron 0 1 mg/kg IV Q12 Day 9/10  · Can stop if clinical improvement earlier  · Baricitinib: Ordered for day 5 but only has received 3 doses given midnight timing and patient on BiPAP  · Changed timing to QPM  · If on RA for 24 hours can D/C  · Antibiotics  · None  · Procal <0 05  · Anticoagulation  · Hep gtt for AFib  · Remdesivir: Completed  · Discussed importance of self proning  · OOB TID, early mobilization     Elevated serum creatinine  Assessment & Plan  · Now back at baseline  · Continue to monitor I&O and renal indices  · Holding diuresis as patient appears euvolemic    Pulmonary hypertension (HCC)  Assessment & Plan  Continue sildenafil   Epoprostenol weaned to off    Obstructive sleep apnea  Assessment & Plan  BiPAP at [facility]  Patient 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  Would benefit from sleep study as outpatient      Morbid obesity ([facility], [address] )  Assessment & Plan  Nutrition consulted  Encourage lifestyle modifications    Mixed hyperlipidemia  Assessment & Plan  Continue home statin    CHF (congestive heart failure) (HCC)  Assessment & Plan  Wt Readings from Last 3 Encounters:   12/04/22 (!) 178 kg (391 lb 8 6 oz)     · Hold lasix given euvolemic state, contraction alkalemia; Continue negative fluid balance  · Daily weights  · Epoprostenol weaned to off via minimization protocol, monitor closely for rebound hypoxia,  Continue to wean oxygen   · Continue sildenafil      ----------------------------------------------------------------------------------------  HPI/24hr events: Patient remains on HFNC 55L 50% during the day and BIPAP at HS  No overnight events  Patient appropriate for transfer out of the ICU today?: No  Disposition: Continue Stepdown Level 1 level of care   Code Status: Level 1 - Full Code  ---------------------------------------------------------------------------------------  SUBJECTIVE  "I feel like I'm doing better "    Review of Systems   All other systems reviewed and are negative  Review of systems was reviewed and negative unless stated above in HPI/24-hour events   ---------------------------------------------------------------------------------------  OBJECTIVE    Vitals   Vitals:    12/10/22 1913 12/10/22 2212 12/10/22 2244 226   BP: 163/99 143/90     BP Location:       Pulse: 97 80     Resp: 22      Temp: 98 °F (36 7 °C) 98 °F (36 7 °C)     TempSrc:       SpO2: 95% 94% 92% 96%   Weight:       Height:         Temp (24hrs), Av °F (36 7 °C), Min:97 9 °F (36 6 °C), Max:98 °F (36 7 °C)  Current: Temperature: 98 °F (36 7 °C)  Arterial Line BP: 151/101  Arterial Line MAP (mmHg): 121 mmHg    Respiratory:  SpO2: SpO2: 96 %  Nasal Cannula O2 Flow Rate (L/min): 60 L/min (HF)    Invasive/non-invasive ventilation settings   Respiratory    Lab Data (Last 4 hours)    None         O2/Vent Data (Last 4 hours)       0226          Non-Invasive Ventilation Mode BiPAP                   Physical Exam  Constitutional:       General: He is not in acute distress  Appearance: He is obese  HENT:      Head: Normocephalic and atraumatic  Eyes:      Conjunctiva/sclera: Conjunctivae normal    Cardiovascular:      Rate and Rhythm: Normal rate  Rhythm irregular  Pulses: Normal pulses  Heart sounds: Normal heart sounds     Pulmonary:      Effort: Pulmonary effort is normal  No respiratory distress  Breath sounds: Normal breath sounds  Abdominal:      Palpations: Abdomen is soft  Musculoskeletal:      Right lower leg: No edema  Left lower leg: No edema  Skin:     General: Skin is warm and dry  Comments: PVD discoloration B/L LE   Neurological:      General: No focal deficit present  Mental Status: He is alert and oriented to person, place, and time  Mental status is at baseline               Laboratory and Diagnostics:  Results from last 7 days   Lab Units 12/11/22  0430 12/10/22  0311 12/09/22  0531 12/08/22  0526 12/06/22  0436 12/05/22  1109 12/05/22  0440 12/04/22  0556   WBC Thousand/uL 8 05 8 53 7 61 6 12 7 41 10 41* 9 95 9 53   HEMOGLOBIN g/dL 13 8 14 2 13 8 13 6 13 5 14 0 13 5 13 8   HEMATOCRIT % 45 6 46 9 46 5 46 9 47 6 49 3 48 4 49 1   PLATELETS Thousands/uL 147* 146* 140* 150 179 219 206 229   NEUTROS PCT %  --   --  91* 88* 90*  --  91* 89*   MONOS PCT %  --   --  3* 5 4  --  4 4     Results from last 7 days   Lab Units 12/11/22  0430 12/10/22  0311 12/08/22  0526 12/07/22  0434 12/06/22  0436 12/06/22  0015 12/05/22  1800 12/05/22  1109 12/05/22  0440 12/04/22  1158 12/04/22  0556   SODIUM mmol/L 137 141 143 144 143 144 150*   < > 148*   < > 149*   POTASSIUM mmol/L 4 6 4 7 4 6 4 4 4 1 4 1 4 0   < > 4 3   < > 4 7   CHLORIDE mmol/L 101 102 103 103 103 102 103   < > 105   < > 103   CO2 mmol/L 32 33* 36* 37* 38* 40* 40*   < > 34*   < > 40*   ANION GAP mmol/L 4 6 4 4 2* 2* 7   < > 9   < > 6   BUN mg/dL 29* 33* 34* 35* 35* 37* 39*   < > 43*   < > 39*   CREATININE mg/dL 0 82 0 87 0 86 0 90 0 93 0 95 1 06   < > 1 18   < > 1 32*   CALCIUM mg/dL 8 7 8 9 8 8 8 9 9 0 8 7 9 1   < > 9 1   < > 9 1   GLUCOSE RANDOM mg/dL 158* 162* 154* 156* 162* 143* 174*   < > 153*   < > 164*   ALT U/L  --   --  30 32  --   --   --   --  27  --  32   AST U/L  --   --  10 16  --   --   --   --  25  --  36   ALK PHOS U/L  --   --  39* 44*  --   --   -- --  45*  --  49   ALBUMIN g/dL  --   --  2 9* 3 0*  --   --   --   --  3 1*  --  3 2*   TOTAL BILIRUBIN mg/dL  --   --  1 06* 1 01*  --   --   --   --  0 70  --  0 52    < > = values in this interval not displayed  Results from last 7 days   Lab Units 12/10/22  0311 12/08/22  0526 12/07/22  0434 12/06/22  0436 12/05/22  0440 12/04/22  0556   MAGNESIUM mg/dL 2 0 2 2 2 1 2 5 2 3 2 2   PHOSPHORUS mg/dL 3 5 3 7 3 9 3 9 4 2 4 6*      Results from last 7 days   Lab Units 12/10/22  0311 12/09/22  1908 12/09/22  1159 12/09/22  0531 12/08/22  0526 12/07/22  0434 12/06/22  2232 12/05/22  1800 12/05/22  1109   INR   --   --   --   --   --   --   --   --  1 23*   PTT seconds 80* 78* 55* 93* 67* 75* 76*   < > 26    < > = values in this interval not displayed  ABG:  Results from last 7 days   Lab Units 12/08/22  1619   PH ART  7 384   PCO2 ART mm Hg 66 1*   PO2 ART mm Hg 66 5*   HCO3 ART mmol/L 38 6*   BASE EXC ART mmol/L 10 5   ABG SOURCE  Line, Arterial     VBG:  Results from last 7 days   Lab Units 12/08/22  1619   ABG SOURCE  Line, Arterial     Results from last 7 days   Lab Units 12/06/22  0436   PROCALCITONIN ng/ml <0 05       Micro        EKG: Afib in the 80s on tele  Imaging: I have personally reviewed pertinent reports  Intake and Output  I/O       12/09 0701  12/10 0700 12/10 0701  12/11 0700    I V  (mL/kg) 280 2 (1 6) 396 3 (2 2)    Total Intake(mL/kg) 280 2 (1 6) 396 3 (2 2)    Urine (mL/kg/hr) 1300 (0 3) 750 (0 2)    Total Output 1300 750    Net -1019 8 -353 7                Height and Weights   Height: 5' 11" (180 3 cm)  IBW (Ideal Body Weight): 75 3 kg  Body mass index is 54 61 kg/m²    Weight (last 2 days)     None            Nutrition       Diet Orders   (From admission, onward)             Start     Ordered    12/06/22 1103  Diet Cardiovascular; Cardiac; Fluid Restriction 1500 ML, Consistent Carbohydrate Diet Level 2 (5 carb servings/75 grams CHO/meal)  Diet effective now        References: Nutrtion Support Algorithm Enteral vs  Parenteral   Question Answer Comment   Diet Type Cardiovascular    Cardiac Cardiac    Other Restriction(s): Fluid Restriction 1500 ML    Other Restriction(s): Consistent Carbohydrate Diet Level 2 (5 carb servings/75 grams CHO/meal)    RD to adjust diet per protocol?  Yes        12/06/22 1105                  Active Medications  Scheduled Meds:  Current Facility-Administered Medications   Medication Dose Route Frequency Provider Last Rate   • acetaminophen  650 mg Oral Q6H PRN ELIZABETH Pitts     • albuterol  2 puff Inhalation Q4H PRN ELIZABETH Garcia     • atorvastatin  10 mg Oral Daily With [employer] ELIZABETH Meléndez     • baricitinib  4 mg Oral Q24H Vamshi Newell PA-C     • dexamethasone  0 1 mg/kg Intravenous Q12H ELIZABETH Pitts 18 4 mg (12/10/22 1900)   • dextromethorphan-guaiFENesin  10 mL Oral Q4H PRN ELIZABETH Garcia     • heparin (porcine)  3-20 Units/kg/hr (Order-Specific) Intravenous Titrated ANDREZ MilesC 20 Units/kg/hr (12/10/22 1146)   • heparin (porcine)  2,000 Units Intravenous Q1H PRN Vamshi Newell PA-C     • heparin (porcine)  4,000 Units Intravenous Q1H PRN RICK Miles-DEDE     • levalbuterol  1 25 mg Nebulization TID ELIZABETH Horn     • metoprolol tartrate  50 mg Oral Q12H [address] [place] ELIZABETH Peng     • sildenafil  20 mg Oral TID ELIZABETH Garcia     • sodium chloride  3 mL Nebulization TID JocelynELIZABETH Mcgill       Continuous Infusions:  heparin (porcine), 3-20 Units/kg/hr (Order-Specific), Last Rate: 20 Units/kg/hr (12/10/22 1146)      PRN Meds:   acetaminophen, 650 mg, Q6H PRN  albuterol, 2 puff, Q4H PRN  dextromethorphan-guaiFENesin, 10 mL, Q4H PRN  heparin (porcine), 2,000 Units, Q1H PRN  heparin (porcine), 4,000 Units, Q1H PRN        Invasive Devices Review  Invasive Devices     Peripheral Intravenous Line  Duration           Peripheral IV 12/08/22 Dorsal (posterior); Right Hand 3 days    Peripheral IV 12/10/22 Left;Proximal;Ventral (anterior) Forearm <1 day    Peripheral IV 12/10/22 Proximal;Right;Ventral (anterior) Forearm <1 day                Rationale for remaining devices: N/A  ---------------------------------------------------------------------------------------  Advance Directive and Living Will:      Power of :    POLST:    ---------------------------------------------------------------------------------------  Care Time Delivered:   No Critical Care time spent       ShelbyELIZABETH Romero      Portions of the record may have been created with voice recognition software  Occasional wrong word or "sound a like" substitutions may have occurred due to the inherent limitations of voice recognition software    Read the chart carefully and recognize, using context, where substitutions have occurred

## 2022-12-11 NOTE — RESPIRATORY THERAPY NOTE
RT Ventilator Management Note  [place] 46 y o  male MRN: [id number]  Unit/Bed#:  Encounter: [id number]      Daily Screen    No data found in the last 10 encounters             Physical Exam:   General Appearance: Awake  O2 Device: opti flow      Resp Comments: pt requesting to come off NIV, placed pt back on HF at this time       12/11/22 1523   Non-Invasive Information   O2 Interface Device HFNC prongs   Non-Invasive Ventilation Mode HFNC (High flow)   SpO2 (!) 88 %   $ Pulse Oximetry Spot Check Charge Completed   Non-Invasive Settings   FiO2 (%) (S)  55   Flow (lpm) (S)  55   Maintenance   Water bag changed No

## 2022-12-12 LAB
ANION GAP SERPL CALCULATED.3IONS-SCNC: 7 MMOL/L (ref 4–13)
APTT PPP: 96 SECONDS (ref 23–37)
APTT PPP: 98 SECONDS (ref 23–37)
BASOPHILS # BLD AUTO: 0 THOUSANDS/ÂΜL (ref 0–0.1)
BASOPHILS NFR BLD AUTO: 0 % (ref 0–1)
BUN SERPL-MCNC: 26 MG/DL (ref 5–25)
CA-I BLD-SCNC: 1.22 MMOL/L (ref 1.12–1.32)
CALCIUM SERPL-MCNC: 9 MG/DL (ref 8.3–10.1)
CHLORIDE SERPL-SCNC: 104 MMOL/L (ref 96–108)
CO2 SERPL-SCNC: 29 MMOL/L (ref 21–32)
CREAT SERPL-MCNC: 0.83 MG/DL (ref 0.6–1.3)
EOSINOPHIL # BLD AUTO: 0 THOUSAND/ÂΜL (ref 0–0.61)
EOSINOPHIL NFR BLD AUTO: 0 % (ref 0–6)
ERYTHROCYTE [DISTWIDTH] IN BLOOD BY AUTOMATED COUNT: 14.6 % (ref 11.6–15.1)
GFR SERPL CREATININE-BSD FRML MDRD: 101 ML/MIN/1.73SQ M
GLUCOSE SERPL-MCNC: 159 MG/DL (ref 65–140)
HCT VFR BLD AUTO: 46 % (ref 36.5–49.3)
HGB BLD-MCNC: 14 G/DL (ref 12–17)
IMM GRANULOCYTES # BLD AUTO: 0.05 THOUSAND/UL (ref 0–0.2)
IMM GRANULOCYTES NFR BLD AUTO: 1 % (ref 0–2)
LYMPHOCYTES # BLD AUTO: 0.34 THOUSANDS/ÂΜL (ref 0.6–4.47)
LYMPHOCYTES NFR BLD AUTO: 4 % (ref 14–44)
MAGNESIUM SERPL-MCNC: 2 MG/DL (ref 1.6–2.6)
MCH RBC QN AUTO: 24.7 PG (ref 26.8–34.3)
MCHC RBC AUTO-ENTMCNC: 30.4 G/DL (ref 31.4–37.4)
MCV RBC AUTO: 81 FL (ref 82–98)
MONOCYTES # BLD AUTO: 0.41 THOUSAND/ÂΜL (ref 0.17–1.22)
MONOCYTES NFR BLD AUTO: 5 % (ref 4–12)
NEUTROPHILS # BLD AUTO: 7.17 THOUSANDS/ÂΜL (ref 1.85–7.62)
NEUTS SEG NFR BLD AUTO: 90 % (ref 43–75)
NRBC BLD AUTO-RTO: 0 /100 WBCS
PHOSPHATE SERPL-MCNC: 3.7 MG/DL (ref 2.7–4.5)
PLATELET # BLD AUTO: 165 THOUSANDS/UL (ref 149–390)
PMV BLD AUTO: 11.6 FL (ref 8.9–12.7)
POTASSIUM SERPL-SCNC: 4.4 MMOL/L (ref 3.5–5.3)
RBC # BLD AUTO: 5.66 MILLION/UL (ref 3.88–5.62)
SODIUM SERPL-SCNC: 140 MMOL/L (ref 135–147)
WBC # BLD AUTO: 7.97 THOUSAND/UL (ref 4.31–10.16)

## 2022-12-12 RX ORDER — METOPROLOL SUCCINATE 50 MG/1
50 TABLET, EXTENDED RELEASE ORAL DAILY
Status: DISCONTINUED | OUTPATIENT
Start: 2022-12-13 | End: 2022-12-16

## 2022-12-12 RX ORDER — FUROSEMIDE 10 MG/ML
80 INJECTION INTRAMUSCULAR; INTRAVENOUS
Status: DISCONTINUED | OUTPATIENT
Start: 2022-12-12 | End: 2022-12-15

## 2022-12-12 RX ORDER — METOPROLOL TARTRATE 50 MG/1
50 TABLET, FILM COATED ORAL EVERY 12 HOURS SCHEDULED
Status: COMPLETED | OUTPATIENT
Start: 2022-12-12 | End: 2022-12-12

## 2022-12-12 RX ADMIN — HEPARIN SODIUM 22 UNITS/KG/HR: 10000 INJECTION, SOLUTION INTRAVENOUS at 04:46

## 2022-12-12 RX ADMIN — SILDENAFIL 20 MG: 20 TABLET ORAL at 08:51

## 2022-12-12 RX ADMIN — LEVALBUTEROL HYDROCHLORIDE 1.25 MG: 1.25 SOLUTION, CONCENTRATE RESPIRATORY (INHALATION) at 08:03

## 2022-12-12 RX ADMIN — SILDENAFIL 20 MG: 20 TABLET ORAL at 15:41

## 2022-12-12 RX ADMIN — METOPROLOL TARTRATE 50 MG: 50 TABLET, FILM COATED ORAL at 08:51

## 2022-12-12 RX ADMIN — APIXABAN 5 MG: 5 TABLET, FILM COATED ORAL at 17:58

## 2022-12-12 RX ADMIN — METOPROLOL TARTRATE 50 MG: 50 TABLET, FILM COATED ORAL at 20:19

## 2022-12-12 RX ADMIN — SILDENAFIL 20 MG: 20 TABLET ORAL at 20:22

## 2022-12-12 RX ADMIN — BARICITINIB 4 MG: 2 TABLET, FILM COATED ORAL at 08:51

## 2022-12-12 RX ADMIN — ISODIUM CHLORIDE 3 ML: 0.03 SOLUTION RESPIRATORY (INHALATION) at 08:03

## 2022-12-12 RX ADMIN — ATORVASTATIN CALCIUM 10 MG: 10 TABLET, FILM COATED ORAL at 15:41

## 2022-12-12 RX ADMIN — DEXAMETHASONE SODIUM PHOSPHATE 18.4 MG: 10 INJECTION, SOLUTION INTRAMUSCULAR; INTRAVENOUS at 12:00

## 2022-12-12 RX ADMIN — APIXABAN 5 MG: 5 TABLET, FILM COATED ORAL at 12:39

## 2022-12-12 RX ADMIN — FUROSEMIDE 80 MG: 10 INJECTION, SOLUTION INTRAMUSCULAR; INTRAVENOUS at 15:41

## 2022-12-12 NOTE — RESPIRATORY THERAPY NOTE
RT Protocol Note  Caio Morris 46 y o  male MRN: [id number]  Unit/Bed#:  Encounter: [id number]    Assessment    Principal Problem:    Acute respiratory failure due to COVID-19 [facility])  Active Problems:    CHF (congestive heart failure) (HCC)    Mixed hyperlipidemia    Morbid obesity (HCC)    Obstructive sleep apnea    Pulmonary hypertension (HCC)    Elevated serum creatinine    Sepsis ([address] ), POA      Home Pulmonary Medications:  None    Home Devices/Therapy: BiPAP/CPAP    Past Medical History:   Diagnosis Date    CHF (congestive heart failure) (HCC)     Hypertension     Sleep apnea      Social History     Socioeconomic History    Marital status: /Civil Union     Spouse name: None    Number of children: None    Years of education: None    Highest education level: None   Occupational History    None   Tobacco Use    Smoking status: Former     Types: Cigarettes    Smokeless tobacco: Never   Substance and Sexual Activity    Alcohol use: Not Currently    Drug use: Not Currently    Sexual activity: None   Other Topics Concern    None   Social History Narrative    None     Social Determinants of Health     Financial Resource Strain: Not on file   Food Insecurity: No Food Insecurity    Worried About Running Out of Food in the Last Year: Never true    Ran Out of Food in the Last Year: Never true   Transportation Needs: No Transportation Needs    Lack of Transportation (Medical): No    Lack of Transportation (Non-Medical):  No   Physical Activity: Not on file   Stress: Not on file   Social Connections: Not on file   Intimate Partner Violence: Not on file   Housing Stability: Unknown    Unable to Pay for Housing in the Last Year: No    Number of Places Lived in the Last Year: Not on file    Unstable Housing in the Last Year: No       Subjective    Subjective Data: pt awake and alert with good spo2 decrease FIO2 at this time, will continue to titrate as tolerated, pt to wear BiPAP for HS     Objective    Physical Exam:   Assessment Type: Pre-treatment  General Appearance: Alert, Awake  Respiratory Pattern: Normal  Chest Assessment: Chest expansion symmetrical  Bilateral Breath Sounds: Diminished  Cough: None  O2 Device: [address]    Vitals:  Blood pressure (!) 142/105, pulse (P) 83, temperature 97 8 °F (36 6 °C), resp  rate (P) 16, height 5' 11" (1 803 m), weight (!) 169 kg (372 lb 2 2 oz), SpO2 (!) 88 %  Results from last 7 days   Lab Units 12/08/22  1619 12/06/22  1832 12/06/22  1244   PH ART  7 384   < > 7 348*   PCO2 ART mm Hg 66 1*   < > 76 2*   PO2 ART mm Hg 66 5*   < > 95 2   HCO3 ART mmol/L 38 6*   < > 40 9*   BASE EXC ART mmol/L 10 5   < > 11 6   O2 CONTENT ART mL/dL 19 5   < > 19 9   O2 HGB, ARTERIAL % 91 7*   < > 96 1   ABG SOURCE  Line, Arterial   < > Line, Arterial   HANNAH TEST   --   --  Yes    < > = values in this interval not displayed  Imaging and other studies: I have personally reviewed pertinent reports  O2 Device: opti    Plan    Respiratory Plan: No distress/Pulmonary history        Resp Comments: Pt sitting in chair in no apparent distress  Offers no complaints  Tx given  Will change nebs to PRN at this time

## 2022-12-12 NOTE — RESPIRATORY THERAPY NOTE
12/12/22 0225   Respiratory Assessment   General Appearance Sleeping   Respiratory Pattern Assisted;Spontaneous   O2 Device v60   Non-Invasive Information   O2 Interface Device Face mask   Non-Invasive Ventilation Mode BiPAP   $ Intermittent NIV Yes   SpO2 92 %   $ Pulse Oximetry Spot Check Charge Completed   Non-Invasive Settings   IPAP (cm) 25 cm   EPAP (cm) 15 cm   Rate (Set) 10   FiO2 (%) 60   Rise Time 2   Inspiratory Time (Set) 0 8   Humidification   (heater)   Temperature (Set) 31   Non-Invasive Readings   Skin Intervention Skin intact;Mask rotated   Total Rate 14   Vt (mL) (Mech) 1175   MV (Mech) 16   Peak Pressure (Obs) 26   Heater Temperature (Obs) 30 9   Non-Invasive Alarms   Insp Pressure High (cm H20) 35   Insp Pressure Low (cm H20) 8   Low Insp Pressure Time (sec) 20 sec   MV Low (L/min) 3   Vt High (mL) 1500   Vt Low (mL) 300   High Resp Rate (BPM) 35 BPM   Low Resp Rate (BPM) 8 BPM   Apnea Interval (sec) 20   Maintenance   Water bag changed Yes

## 2022-12-12 NOTE — CASE MANAGEMENT
Case Management Progress Note    Patient name Josefa Villagran  Location / MRN [id number]  : 1970 Date 2022       LOS (days): 11  Geometric Mean LOS (GMLOS) (days): 5 00  Days to GMLOS:-5 7        OBJECTIVE:        Current admission status: Inpatient  Preferred Pharmacy:   PATIENT/FAMILY REPORTS NO PREFERRED PHARMACY  No address on file      Primary Care Provider: No primary care provider on file  Primary Insurance: BLUE CROSS  Secondary Insurance:     PROGRESS NOTE:    CM spoke with pt over room phone due to covid positive status  CM reviewed therapy recommendation of STR  Pt agreeable to blanket referral for STR facilities  STR referrals sent in [address] [place] [address]

## 2022-12-12 NOTE — ASSESSMENT & PLAN NOTE
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

## 2022-12-12 NOTE — PROGRESS NOTES
[address] [facility]  Progress Note Rosalie Mini 1970, 46 y o  male MRN: [id number]  Unit/Bed#:  Encounter: [id number]  Primary Care Provider: No primary care provider on file  Date and time admitted to hospital: 12/1/2022  5:52 PM    * Acute respiratory failure due to COVID-19 [facility])  Assessment & Plan  · Infectious markers at presentation:  · CRP 20 0  · Now down to < 3  · Trend Q2 days  · D-Dimer 3 50  · Started on heparin gtt for AFib  · PE study on arrival negative  · LE Duplex negative  · D-Dimer now 0 86  · Oxygen: HFNC 50% 55L, having difficulty with titrating down  · Pathway: Very severe  · Steroids: completed 10 day course Decadron 0 1 mg/kg IV Q12  · Baricitinib: Day 11/14  · If on RA for 24 hours can D/C  · Antibiotics  · None  · Procal <0 05  · Anticoagulation  · Hep gtt for AFib  · Remdesivir: Completed  · Discussed importance of self proning  · OOB TID, early mobilization     Elevated serum creatinine  Assessment & Plan  · Now back at baseline  · Continue to monitor I&O and renal indices  · Holding diuresis as patient appears euvolemic    Pulmonary hypertension (HCC)  Assessment & Plan  Continue sildenafil   Epoprostenol weaned to off    Obstructive sleep apnea  Assessment & Plan  BiPAP at [facility]  Patient 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  Would benefit from sleep study as outpatient      Morbid obesity ([address] )  Assessment & Plan  Nutrition consulted  Encourage lifestyle modifications    Mixed hyperlipidemia  Assessment & Plan  Continue home statin    CHF (congestive heart failure) (HCC)  Assessment & Plan  Wt Readings from Last 3 Encounters:   12/04/22 (!) 178 kg (391 lb 8 6 oz)     · Hold lasix given euvolemic state, contraction alkalemia; Continue negative fluid balance  · Daily weights  · Epoprostenol weaned to off via minimization protocol, monitor closely for rebound hypoxia,  Continue to wean oxygen   · Continue sildenafil      ----------------------------------------------------------------------------------------  HPI/24hr events: Patient continues on HFNC 55 L 55% and BIPAP at HS  Completed 10 day course of decadron  No overnight events  Patient appropriate for transfer out of the ICU today?: No  Disposition: Continue Stepdown Level 1 level of care   Code Status: Level 1 - Full Code  ---------------------------------------------------------------------------------------  SUBJECTIVE  "I'm feeling good "    Review of Systems   All other systems reviewed and are negative  Review of systems was reviewed and negative unless stated above in HPI/24-hour events   ---------------------------------------------------------------------------------------  OBJECTIVE    Vitals   Vitals:    22 2342 22 0225 22 0500 22 0535   BP:   (!) 142/105    BP Location:       Pulse:   (!) 117    Resp:       Temp:   97 8 °F (36 6 °C)    TempSrc:       SpO2: 90% 92% 96% 92%   Weight:       Height:         Temp (24hrs), Av 2 °F (36 8 °C), Min:97 8 °F (36 6 °C), Max:98 6 °F (37 °C)  Current: Temperature: 97 8 °F (36 6 °C)  Arterial Line BP: 151/101  Arterial Line MAP (mmHg): 121 mmHg    Respiratory:  SpO2: SpO2: 92 %  Nasal Cannula O2 Flow Rate (L/min): 55 L/min (HF)    Invasive/non-invasive ventilation settings   Respiratory    Lab Data (Last 4 hours)    None         O2/Vent Data (Last 4 hours)       05        Non-Invasive Ventilation Mode BiPAP HFNC (High flow)                  Physical Exam  Vitals reviewed  Constitutional:       Appearance: He is obese  HENT:      Head: Normocephalic and atraumatic  Mouth/Throat:      Mouth: Mucous membranes are moist    Eyes:      Conjunctiva/sclera: Conjunctivae normal    Cardiovascular:      Rate and Rhythm: Normal rate  Rhythm irregular  Pulses: Normal pulses  Heart sounds: Normal heart sounds     Pulmonary:      Effort: Pulmonary effort is normal       Breath sounds: Decreased breath sounds present  Abdominal:      Palpations: Abdomen is soft  Musculoskeletal:      Right lower leg: No edema  Left lower leg: No edema  Skin:     General: Skin is warm and dry  Comments: PVD discoloration B/L LE   Neurological:      General: No focal deficit present  Mental Status: He is alert and oriented to person, place, and time  Mental status is at baseline  Psychiatric:         Mood and Affect: Mood normal          Thought Content:  Thought content normal          Judgment: Judgment normal              Laboratory and Diagnostics:  Results from last 7 days   Lab Units 12/12/22  0452 12/11/22  0430 12/10/22  0311 12/09/22  0531 12/08/22  0526 12/06/22  0436 12/05/22  1109   WBC Thousand/uL 7 97 8 05 8 53 7 61 6 12 7 41 10 41*   HEMOGLOBIN g/dL 14 0 13 8 14 2 13 8 13 6 13 5 14 0   HEMATOCRIT % 46 0 45 6 46 9 46 5 46 9 47 6 49 3   PLATELETS Thousands/uL 165 147* 146* 140* 150 179 219   NEUTROS PCT %  --   --   --  91* 88* 90*  --    MONOS PCT %  --   --   --  3* 5 4  --      Results from last 7 days   Lab Units 12/12/22  0452 12/11/22  0430 12/10/22  0311 12/08/22  0526 12/07/22  0434 12/06/22  0436 12/06/22  0015   SODIUM mmol/L 140 137 141 143 144 143 144   POTASSIUM mmol/L 4 4 4 6 4 7 4 6 4 4 4 1 4 1   CHLORIDE mmol/L 104 101 102 103 103 103 102   CO2 mmol/L 29 32 33* 36* 37* 38* 40*   ANION GAP mmol/L 7 4 6 4 4 2* 2*   BUN mg/dL 26* 29* 33* 34* 35* 35* 37*   CREATININE mg/dL 0 83 0 82 0 87 0 86 0 90 0 93 0 95   CALCIUM mg/dL 9 0 8 7 8 9 8 8 8 9 9 0 8 7   GLUCOSE RANDOM mg/dL 159* 158* 162* 154* 156* 162* 143*   ALT U/L  --   --   --  30 32  --   --    AST U/L  --   --   --  10 16  --   --    ALK PHOS U/L  --   --   --  39* 44*  --   --    ALBUMIN g/dL  --   --   --  2 9* 3 0*  --   --    TOTAL BILIRUBIN mg/dL  --   --   --  1 06* 1 01*  --   --      Results from last 7 days   Lab Units 12/12/22  0452 12/10/22  0311 12/08/22  0526 12/07/22  0434 12/06/22  0436   MAGNESIUM mg/dL 2 0 2 0 2 2 2 1 2 5   PHOSPHORUS mg/dL 3 7 3 5 3 7 3 9 3 9      Results from last 7 days   Lab Units 12/12/22  0452 12/11/22  1928 12/11/22  1243 12/11/22  0430 12/10/22  0311 12/09/22  1908 12/09/22  1159 12/05/22  1800 12/05/22  1109   INR   --   --   --   --   --   --   --   --  1 23*   PTT seconds 96* 83* 53* 24 80* 78* 55*   < > 26    < > = values in this interval not displayed  ABG:  Results from last 7 days   Lab Units 12/08/22  1619   PH ART  7 384   PCO2 ART mm Hg 66 1*   PO2 ART mm Hg 66 5*   HCO3 ART mmol/L 38 6*   BASE EXC ART mmol/L 10 5   ABG SOURCE  Line, Arterial     VBG:  Results from last 7 days   Lab Units 12/08/22  1619   ABG SOURCE  Line, Arterial     Results from last 7 days   Lab Units 12/06/22  0436   PROCALCITONIN ng/ml <0 05       Micro        EKG: Afib in the 70s on tele  Imaging: I have personally reviewed pertinent reports  Intake and Output  I/O       12/10 0701  12/11 0700 12/11 0701 12/12 0700    P  O   480    I V  (mL/kg) 396 3 (2 2) 438 7 (2 5)    Total Intake(mL/kg) 396 3 (2 2) 918 7 (5 2)    Urine (mL/kg/hr) 750 (0 2) 1900 (0 4)    Stool  0    Total Output 750 1900    Net -353 7 -981  3          Unmeasured Urine Occurrence  1 x    Unmeasured Stool Occurrence  1 x          Height and Weights   Height: 5' 11" (180 3 cm)  IBW (Ideal Body Weight): 75 3 kg  Body mass index is 54 61 kg/m²    Weight (last 2 days)     None            Nutrition       Diet Orders   (From admission, onward)             Start     Ordered    12/06/22 1103  Diet Cardiovascular; Cardiac; Fluid Restriction 1500 ML, Consistent Carbohydrate Diet Level 2 (5 carb servings/75 grams CHO/meal)  Diet effective now        References:    Nutrtion Support Algorithm Enteral vs  Parenteral   Question Answer Comment   Diet Type Cardiovascular    Cardiac Cardiac    Other Restriction(s): Fluid Restriction 1500 ML    Other Restriction(s): Consistent Carbohydrate Diet Level 2 (5 carb servings/75 grams CHO/meal)    RD to adjust diet per protocol?  Yes        12/06/22 1105                  Active Medications  Scheduled Meds:  Current Facility-Administered Medications   Medication Dose Route Frequency Provider Last Rate   • acetaminophen  650 mg Oral Q6H PRN ELIZABETH Farias     • albuterol  2 puff Inhalation Q4H PRN ELIZABETH Grimaldo     • atorvastatin  10 mg Oral Daily With [employer] ELIZABETH Meléndez     • baricitinib  4 mg Oral Q24H Suad Garcia PA-C     • dexamethasone  0 1 mg/kg Intravenous Q12H ELIZABETH Farias 18 4 mg (12/11/22 1928)   • dextromethorphan-guaiFENesin  10 mL Oral Q4H PRN ELIZABETH Grimaldo     • heparin (porcine)  3-30 Units/kg/hr (Order-Specific) Intravenous Titrated ELIZABETH Rose 20 Units/kg/hr (12/12/22 0529)   • heparin (porcine)  2,000 Units Intravenous Q1H PRN Suad Garcia PA-C     • heparin (porcine)  4,000 Units Intravenous Q1H PRN Suad Garcia PA-C     • levalbuterol  1 25 mg Nebulization TID ELIZABETH Lamas     • metoprolol tartrate  50 mg Oral Q12H [address] ELIZABETH Rose     • sildenafil  20 mg Oral TID ELIZABETH Grimaldo     • sodium chloride  3 mL Nebulization TID ELIZABETH Lamas       Continuous Infusions:  heparin (porcine), 3-30 Units/kg/hr (Order-Specific), Last Rate: 20 Units/kg/hr (12/12/22 0529)      PRN Meds:   acetaminophen, 650 mg, Q6H PRN  albuterol, 2 puff, Q4H PRN  dextromethorphan-guaiFENesin, 10 mL, Q4H PRN  heparin (porcine), 2,000 Units, Q1H PRN  heparin (porcine), 4,000 Units, Q1H PRN        Invasive Devices Review  Invasive Devices     Peripheral Intravenous Line  Duration           Peripheral IV 12/10/22 Left;Proximal;Ventral (anterior) Forearm 1 day    Peripheral IV 12/10/22 Proximal;Right;Ventral (anterior) Forearm 1 day                Rationale for remaining devices: N/A  ---------------------------------------------------------------------------------------  Advance Directive and Living Will:      Power of :    POLST:    ---------------------------------------------------------------------------------------  Care Time Delivered:   No Critical Care time spent       ELIZABETH Luna      Portions of the record may have been created with voice recognition software  Occasional wrong word or "sound a like" substitutions may have occurred due to the inherent limitations of voice recognition software    Read the chart carefully and recognize, using context, where substitutions have occurred

## 2022-12-12 NOTE — RESPIRATORY THERAPY NOTE
12/11/22 6615   Respiratory Assessment   Resp Comments placed pt on NIV for HS use   O2 Device v60 Eleven   Non-Invasive Information   O2 Interface Device Face mask   Non-Invasive Ventilation Mode BiPAP   SpO2 90 %   $ Pulse Oximetry Spot Check Charge Completed   Non-Invasive Settings   IPAP (cm) 25 cm   EPAP (cm) 15 cm   Rate (Set) 10   FiO2 (%) 60   Rise Time 2  (with 10 min ramp applied)   Inspiratory Time (Set) 0 8   Humidification   (heater)   Temperature (Set) 31   Non-Invasive Readings   Skin Intervention Skin intact   Total Rate 25   Vt (mL) (Mech) 753   MV (Mech) 18 8   Peak Pressure (Obs) 18   Heater Temperature (Obs)   (unit warming)   Leak (lpm) 29   Non-Invasive Alarms   Insp Pressure High (cm H20) 35   Insp Pressure Low (cm H20) 8   Low Insp Pressure Time (sec) 20 sec   MV Low (L/min) 3   Vt High (mL) 1500   Vt Low (mL) 300   High Resp Rate (BPM) 35 BPM   Low Resp Rate (BPM) 8 BPM   Apnea Interval (sec) 20   Maintenance   Water bag changed Yes

## 2022-12-12 NOTE — RESPIRATORY THERAPY NOTE
12/12/22 3259   Respiratory Assessment   Resp Comments Pt placed on [address] at this time  Will cont to wean as able     O2 Device MFNC   Oxygen Therapy/Pulse Ox   O2 Device (S)  Mid flow nasal cannula   O2 Therapy Oxygen humidified   Nasal Cannula O2 Flow Rate (L/min) 15 L/min   Calculated FIO2 (%) - Nasal Cannula 80   SpO2 (!) 88 %   SpO2 Activity At Rest   $ Pulse Oximetry Spot Check Charge Completed

## 2022-12-12 NOTE — ASSESSMENT & PLAN NOTE
· Infectious markers at presentation:  · CRP 20 0  · Now down to < 3  · Trend Q2 days  · D-Dimer 3 50  · Started on heparin gtt for AFib  · PE study on arrival negative  · LE Duplex negative  · D-Dimer now 0 86  · Oxygen: HFNC 50% 55L, having difficulty with titrating down  · Pathway: Very severe  · Steroids: completed 10 day course Decadron 0 1 mg/kg IV Q12  · Baricitinib: Day 11/14  · If on RA for 24 hours can D/C  · Antibiotics  · None  · Procal <0 05  · Anticoagulation  · Hep gtt for AFib  · Remdesivir: Completed  · Discussed importance of self proning  · OOB TID, early mobilization

## 2022-12-12 NOTE — CONSULTS
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, volume overloaded at present, will start lasix 80mg IV BID  Continue daily weights, salt restriction, strict intake and outpt  Echo done on   EF normal, IVS with setpal bounce, RV severely dilated, systolic function mildly reduced  Limited study  Continue with eliquis, lipitor, lasix, metoprolol, revatio  2  Pneumonia secondary to COVID-19, still requiring high level O2, mid flow, steroids, nebulizers, oxygen, mgmt per Critical Care  3  Paroxsymal Afib, Hr stable 80's, continue with metoprolol and eliquis  Monitor tele  4  Pulmonary HTN, on Sildenafil  Continue all meds  5  Hypertension, elevated at time, last /105, continue with lasix, metoprolol, revatio  6  HPL on lipitor    7  Obesity, BMI 51, weight loss advised        History of Present Illness   Physician Requesting Consult: Ramonita Peng,*    Reason for Consult / Principal Problem: ph/rv failure    HPI: Josefa Villagran is a 46y o  year old male who presents with worsening shortness of breath  Pt states it has been present for the last few months  He states that he was getting worse and therefore came to the hospital  He also notes he was COVID + at urgent care the day of admission  He is noted to bilateral PNA on cxr while in the ED  Also noted to be hypoxic with O2sats 70's, improved to 90's with oxygen via NC      PMH: COPD, BISHNU, sleep apnea, Pulmonary HTN, Chronic CHF  Consults    [id number] Afib with RVR, RBBB, nsstwa      Review of Systems   Constitutional: Negative  Respiratory: Positive for shortness of breath  Cardiovascular: Negative  Gastrointestinal: Negative  Neurological: Negative  Hematological: Negative  Psychiatric/Behavioral: Negative      All other systems reviewed and are negative  Historical Information   Past Medical History:   Diagnosis Date   • CHF (congestive heart failure) ([facility] [address] )    • Hypertension    • Sleep apnea      History reviewed  No pertinent surgical history  Social History     Substance and Sexual Activity   Alcohol Use Not Currently     Social History     Substance and Sexual Activity   Drug Use Not Currently     Social History     Tobacco Use   Smoking Status Former   • Types: Cigarettes   Smokeless Tobacco Never       Family History: History reviewed  No pertinent family history  Meds/Allergies   all current active meds have been reviewed and current meds:   Current Facility-Administered Medications   Medication Dose Route Frequency   • acetaminophen (TYLENOL) tablet 650 mg  650 mg Oral Q6H PRN   • albuterol (PROVENTIL HFA,VENTOLIN HFA) inhaler 2 puff  2 puff Inhalation Q4H PRN   • apixaban (ELIQUIS) tablet 5 mg  5 mg Oral BID   • atorvastatin (LIPITOR) tablet 10 mg  10 mg Oral Daily With D[employer]   • baricitinib (OLUMIANT) tablet 4 mg  4 mg Oral Q24H   • dextromethorphan-guaiFENesin (ROBITUSSIN DM) oral syrup 10 mL  10 mL Oral Q4H PRN   • furosemide (LASIX) injection 80 mg  80 mg Intravenous BID (diuretic)   • [START ON 12/13/2022] metoprolol succinate (TOPROL-XL) 24 hr tablet 50 mg  50 mg Oral Daily   • metoprolol tartrate (LOPRESSOR) tablet 50 mg  50 mg Oral Q12H CRISTIN   • sildenafil (REVATIO) tablet 20 mg  20 mg Oral TID     No Known Allergies    Objective   Vitals: Blood pressure (!) 142/105, pulse 83, temperature 97 8 °F (36 6 °C), resp  rate 16, height 5' 11" (1 803 m), weight (!) 169 kg (372 lb 2 2 oz), SpO2 93 %  , Body mass index is 51 9 kg/m² ,   Orthostatic Blood Pressures    Flowsheet Row Most Recent Value   Blood Pressure 142/105 filed at 12/12/2022 0500   Patient Position - Orthostatic VS Sitting filed at 12/11/2022 1734          Systolic (12XAN), GEF:668 , Min:136 , JSV:731     Diastolic (54JWF), BBC:76, Min:87, Max:105        Intake/Output Summary (Last 24 hours) at 12/12/2022 1457  Last data filed at 12/12/2022 0945  Gross per 24 hour   Intake 687 99 ml   Output 1800 ml   Net -1112 01 ml       Invasive Devices     Peripheral Intravenous Line  Duration           Peripheral IV 12/10/22 Left;Proximal;Ventral (anterior) Forearm 2 days    Peripheral IV 12/10/22 Proximal;Right;Ventral (anterior) Forearm 2 days                    Physical Exam:  GEN: Alert and oriented x 3, in no acute distress  Ill appearing and well nourished  HEENT: Sclera anicteric, conjunctivae pink, mucous membranes moist  Oropharynx clear  NECK: Supple, no carotid bruits, no significant JVD  Trachea midline, no thyromegaly  HEART: Irregularly irregular, normal S1 and S2, no murmurs, clicks, gallops or rubs  PMI nondisplaced, no thrills  LUNGS: Decreased breath sounds bibasilar  No increased work of breathing or signs of respiratory distress  ABDOMEN: Soft, nontender, nondistended, normoactive bowel sounds  EXTREMITIES: +2 edema, Skin warm and well perfused, no clubbing, cyanosis  NEURO: No focal findings  Normal speech  Mood and affect normal    SKIN: Normal without suspicious lesions on exposed skin        Lab Results:     Troponins:   Results from last 7 days   Lab Units 12/09/22  0531 12/05/22  1800   HS TNI 4HR ng/L  --  15   HSTNI D4 ng/L  --  0   NT-PRO BNP pg/mL 527*  --        CBC with diff:   Results from last 7 days   Lab Units 12/12/22  0452 12/11/22  0430 12/10/22  0311 12/09/22  0531 12/08/22  0526 12/06/22  0436   WBC Thousand/uL 7 97 8 05 8 53 7 61 6 12 7 41   HEMOGLOBIN g/dL 14 0 13 8 14 2 13 8 13 6 13 5   HEMATOCRIT % 46 0 45 6 46 9 46 5 46 9 47 6   MCV fL 81* 82 83 82 85 86   PLATELETS Thousands/uL 165 147* 146* 140* 150 179   MCH pg 24 7* 24 8* 25 0* 24 3* 24 6* 24 4*   MCHC g/dL 30 4* 30 3* 30 3* 29 7* 29 0* 28 4*   RDW % 14 6 14 8 14 8 14 7 14 9 15 3*   MPV fL 11 6 11 3 10 6 10 5 10 1 10 2   NRBC AUTO /100 WBCs 0  --   --  0 0 0         CMP: Results from last 7 days   Lab Units 12/12/22  0452 12/11/22  0430 12/10/22  0311 12/08/22  0526 12/07/22  0434 12/06/22  0436 12/06/22  0015   POTASSIUM mmol/L 4 4 4 6 4 7 4 6 4 4 4 1 4 1   CHLORIDE mmol/L 104 101 102 103 103 103 102   CO2 mmol/L 29 32 33* 36* 37* 38* 40*   BUN mg/dL 26* 29* 33* 34* 35* 35* 37*   CREATININE mg/dL 0 83 0 82 0 87 0 86 0 90 0 93 0 95   CALCIUM mg/dL 9 0 8 7 8 9 8 8 8 9 9 0 8 7   AST U/L  --   --   --  10 16  --   --    ALT U/L  --   --   --  30 32  --   --    ALK PHOS U/L  --   --   --  39* 44*  --   --    EGFR ml/min/1 73sq m 101 101 99 99 97 94 91

## 2022-12-13 RX ADMIN — APIXABAN 5 MG: 5 TABLET, FILM COATED ORAL at 08:05

## 2022-12-13 RX ADMIN — SILDENAFIL 20 MG: 20 TABLET ORAL at 20:06

## 2022-12-13 RX ADMIN — SILDENAFIL 20 MG: 20 TABLET ORAL at 16:44

## 2022-12-13 RX ADMIN — BARICITINIB 4 MG: 2 TABLET, FILM COATED ORAL at 08:03

## 2022-12-13 RX ADMIN — APIXABAN 5 MG: 5 TABLET, FILM COATED ORAL at 18:25

## 2022-12-13 RX ADMIN — SILDENAFIL 20 MG: 20 TABLET ORAL at 08:05

## 2022-12-13 RX ADMIN — FUROSEMIDE 80 MG: 10 INJECTION, SOLUTION INTRAMUSCULAR; INTRAVENOUS at 08:03

## 2022-12-13 RX ADMIN — FUROSEMIDE 80 MG: 10 INJECTION, SOLUTION INTRAMUSCULAR; INTRAVENOUS at 16:43

## 2022-12-13 RX ADMIN — ATORVASTATIN CALCIUM 10 MG: 10 TABLET, FILM COATED ORAL at 16:43

## 2022-12-13 RX ADMIN — METOPROLOL SUCCINATE 50 MG: 50 TABLET, EXTENDED RELEASE ORAL at 08:05

## 2022-12-13 NOTE — ASSESSMENT & PLAN NOTE
· CT PE protocol noting evidence of pulmonary hypertension  · Echocardiogram notes right ventricular cavity size severely dilated  · Concern for acute on chronic right heart failure exacerbation  · Continue IV Lasix 80 twice daily  · -13 6L so far, wt is down 34lbs since admit  · Appreciate cardiology input

## 2022-12-13 NOTE — ED PROVIDER NOTES
History  Chief Complaint   Patient presents with   • Shortness of Breath     SOB x4 days pt to [facility] today and covid rlue out and with ghulam pneumonia noted on chest xray  O2 sat on room air 72 and increased to low to mid 90's on nasal cannula     51-year-old male presenting to the emergency department for evaluation of shortness of breath  Patient's had 4 days of shortness of breath, recently diagnosed with "bilateral pneumonia "on chest x-ray, and COVID rule out swab  Had oxygen saturations as low as 72% prehospital which is improved with nasal cannula here, he does have some bilateral leg swelling as well  Prior to Admission Medications   Prescriptions Last Dose Informant Patient Reported? Taking?   famotidine (PEPCID) 40 MG tablet   Yes Yes   Sig: Take 40 mg by mouth   furosemide (LASIX) 20 mg tablet   Yes Yes   Sig: Take 20 mg by mouth   lisinopril-hydrochlorothiazide (PRINZIDE,ZESTORETIC) 20-25 MG per tablet   Yes Yes   Sig: Take 1 tablet by mouth every morning   metoprolol succinate (TOPROL-XL) 25 mg 24 hr tablet   Yes Yes   Sig: Take 25 mg by mouth   potassium chloride (Klor-Con) 10 mEq tablet   Yes Yes   Sig: Take 10 mEq by mouth daily   rosuvastatin (CRESTOR) 5 mg tablet   Yes Yes   Sig: Take 5 mg by mouth daily   sildenafil (REVATIO) 20 mg tablet   Yes Yes   Sig: Take 20 mg by mouth Three times a day      Facility-Administered Medications: None       Past Medical History:   Diagnosis Date   • CHF (congestive heart failure) (HCC)    • Hypertension    • Sleep apnea        History reviewed  No pertinent surgical history  History reviewed  No pertinent family history  I have reviewed and agree with the history as documented      E-Cigarette/Vaping     E-Cigarette/Vaping Substances     Social History     Tobacco Use   • Smoking status: Former     Types: Cigarettes   • Smokeless tobacco: Never   Substance Use Topics   • Alcohol use: Not Currently   • Drug use: Not Currently       Review of Systems Constitutional: Negative for appetite change, chills, fatigue and fever  HENT: Negative for sneezing and sore throat  Eyes: Negative for visual disturbance  Respiratory: Positive for shortness of breath  Negative for cough, choking, chest tightness and wheezing  Cardiovascular: Negative for chest pain and palpitations  Gastrointestinal: Negative for abdominal pain, constipation, diarrhea, nausea and vomiting  Genitourinary: Negative for difficulty urinating and dysuria  Neurological: Negative for dizziness, weakness, light-headedness, numbness and headaches  All other systems reviewed and are negative  Physical Exam  Physical Exam  Constitutional:       General: He is not in acute distress  Appearance: He is well-developed  He is not diaphoretic  HENT:      Head: Normocephalic and atraumatic  Eyes:      Pupils: Pupils are equal, round, and reactive to light  Neck:      Vascular: No JVD  Trachea: No tracheal deviation  Cardiovascular:      Rate and Rhythm: Normal rate and regular rhythm  Heart sounds: Normal heart sounds  No murmur heard  No friction rub  No gallop  Pulmonary:      Effort: Pulmonary effort is normal  No respiratory distress  Breath sounds: Decreased breath sounds present  No wheezing or rales  Abdominal:      General: Bowel sounds are normal  There is no distension  Palpations: Abdomen is soft  Tenderness: There is no abdominal tenderness  There is no guarding or rebound  Skin:     General: Skin is warm and dry  Coloration: Skin is not pale  Neurological:      Mental Status: He is alert and oriented to person, place, and time  Cranial Nerves: No cranial nerve deficit  Motor: No abnormal muscle tone     Psychiatric:         Behavior: Behavior normal          Vital Signs  ED Triage Vitals   Temperature Pulse Respirations Blood Pressure SpO2   12/01/22 1800 12/01/22 1800 12/01/22 1800 12/01/22 1800 12/01/22 1800 98 7 °F (37 1 °C) (!) 150 18 (!) 156/116 96 %      Temp Source Heart Rate Source Patient Position - Orthostatic VS BP Location FiO2 (%)   12/01/22 1800 12/01/22 1800 12/01/22 1800 12/01/22 1800 12/02/22 0008   Oral Monitor Sitting Right arm 52      Pain Score       12/02/22 2200       No Pain           Vitals:    12/11/22 2103 12/12/22 0500 12/12/22 0803 12/12/22 2019   BP: 136/87 (!) 142/105  127/96   Pulse: 82 (!) 117 83 (!) 115   Patient Position - Orthostatic VS:             Visual Acuity  Visual Acuity    Flowsheet Row Most Recent Value   L Pupil Size (mm) 2   R Pupil Size (mm) 2   L Pupil Shape Round   R Pupil Shape Round          ED Medications  Medications   sildenafil (REVATIO) tablet 20 mg (20 mg Oral Given 12/12/22 2022)   acetaminophen (TYLENOL) tablet 650 mg (has no administration in time range)   albuterol (PROVENTIL HFA,VENTOLIN HFA) inhaler 2 puff (has no administration in time range)   dextromethorphan-guaiFENesin (ROBITUSSIN DM) oral syrup 10 mL (10 mL Oral Given 12/2/22 1724)   atorvastatin (LIPITOR) tablet 10 mg (10 mg Oral Given 12/12/22 1541)   baricitinib (OLUMIANT) tablet 2 mg (2 mg Oral Given 12/6/22 1019)     Followed by   baricitinib (OLUMIANT) tablet 4 mg (4 mg Oral Given 12/12/22 0851)   apixaban (ELIQUIS) tablet 5 mg (5 mg Oral Given 12/12/22 1758)   metoprolol succinate (TOPROL-XL) 24 hr tablet 50 mg (has no administration in time range)   furosemide (LASIX) injection 80 mg (80 mg Intravenous Given 12/12/22 1541)   albuterol inhalation solution 5 mg (5 mg Nebulization Given 12/1/22 1823)   ipratropium (ATROVENT) 0 02 % inhalation solution 0 5 mg (0 5 mg Nebulization Given 12/1/22 1823)   sodium chloride 0 9 % bolus 500 mL (0 mL Intravenous Stopped 12/1/22 2024)   iohexol (OMNIPAQUE) 350 MG/ML injection (SINGLE-DOSE) 100 mL (100 mL Intravenous Given 12/1/22 1923)   furosemide (LASIX) injection 40 mg (40 mg Intravenous Given 12/1/22 2235)   remdesivir (Veklury) 200 mg in sodium chloride 0 9 % 290 mL IVPB (200 mg Intravenous Given 12/2/22 0041)     Followed by   remdesivir [place]) 100 mg in sodium chloride 0 9 % 270 mL IVPB (0 mg Intravenous Stopped 12/6/22 0659)   heparin (porcine) injection 4,000 Units (4,000 Units Intravenous Given 12/2/22 0103)   perflutren lipid microsphere (DEFINITY) injection (0 9 mL/min Intravenous Given 12/2/22 1500)   metoprolol (LOPRESSOR) injection 10 mg (10 mg Intravenous Given 12/2/22 1707)   dexamethasone (DECADRON) 18 4 mg in sodium chloride 0 9 % 50 mL IVPB (18 4 mg Intravenous New Bag 12/12/22 1200)   calcium gluconate 1 g in sodium chloride 0 9% 50 mL (premix) (0 g Intravenous Stopped 12/3/22 1141)   insulin regular (HumuLIN R,NovoLIN R) injection 10 Units (10 Units Intravenous Given 12/3/22 0943)   dextrose 50 % IV solution 25 mL (25 mL Intravenous Given 12/3/22 0941)   acetaZOLAMIDE (DIAMOX) injection 500 mg (500 mg Intravenous Given 12/3/22 1220)   furosemide (LASIX) injection 80 mg (80 mg Intravenous Given 12/3/22 1220)   sterile water injection **ADS Override Pull** (5 mL  Given 12/3/22 1228)   lidocaine (PF) (XYLOCAINE-MPF) 1 % injection 1 mL (1 mL Infiltration Given 12/3/22 1436)   albumin human (FLEXBUMIN) 25 % injection 12 5 g (0 g Intravenous Stopped 12/4/22 2114)   amiodarone 150 mg in dextrose 5 % 100 mL IV bolus (0 mg Intravenous Stopped 12/6/22 0659)   magnesium sulfate 2 g/50 mL IVPB (premix) 2 g (0 g Intravenous Stopped 12/6/22 0659)   heparin (porcine) injection 4,000 Units (4,000 Units Intravenous Given 12/5/22 1144)   metoprolol (LOPRESSOR) injection 5 mg (5 mg Intravenous Given 12/5/22 1525)   amiodarone 150 mg in dextrose 5 % 100 mL IV bolus (0 mg Intravenous Stopped 12/8/22 0443)   amiodarone 150 mg in dextrose 5 % 100 mL IV bolus (0 mg Intravenous Stopped 12/8/22 0442)   metoprolol tartrate (LOPRESSOR) tablet 50 mg (50 mg Oral Given 12/12/22 2019)       Diagnostic Studies  Results Reviewed     Procedure Component Value Units Date/Time    Comprehensive metabolic panel [[id number]]  (Abnormal) Collected: 12/05/22 0440    Lab Status: Final result Specimen: Blood from Line, Arterial Updated: 12/05/22 0714     Sodium 148 mmol/L      Potassium 4 3 mmol/L      Chloride 105 mmol/L      CO2 34 mmol/L      ANION GAP 9 mmol/L      BUN 43 mg/dL      Creatinine 1 18 mg/dL      Glucose 153 mg/dL      Calcium 9 1 mg/dL      Corrected Calcium 9 8 mg/dL      AST 25 U/L      ALT 27 U/L      Alkaline Phosphatase 45 U/L      Total Protein 6 4 g/dL      Albumin 3 1 g/dL      Total Bilirubin 0 70 mg/dL      eGFR 70 ml/min/1 73sq m     Narrative:      [place] guidelines for Chronic Kidney Disease (CKD):   •  Stage 1 with normal or high GFR (GFR > 90 mL/min/1 73 square meters)  •  Stage 2 Mild CKD (GFR = 60-89 mL/min/1 73 square meters)  •  Stage 3A Moderate CKD (GFR = 45-59 mL/min/1 73 square meters)  •  Stage 3B Moderate CKD (GFR = 30-44 mL/min/1 73 square meters)  •  Stage 4 Severe CKD (GFR = 15-29 mL/min/1 73 square meters)  •  Stage 5 End Stage CKD (GFR <15 mL/min/1 73 square meters)  Note: GFR calculation is accurate only with a steady state creatinine    Magnesium [[id number]]  (Normal) Collected: 12/05/22 0440    Lab Status: Final result Specimen: Blood from Line, Arterial Updated: 12/05/22 0714     Magnesium 2 3 mg/dL     Phosphorus [[id number]]  (Normal) Collected: 12/05/22 0440    Lab Status: Final result Specimen: Blood from Line, Arterial Updated: 12/05/22 0714     Phosphorus 4 2 mg/dL     Comprehensive metabolic panel [[id number]]  (Abnormal) Collected: 12/04/22 0556    Lab Status: Final result Specimen: Blood from Line, Arterial Updated: 12/04/22 0659     Sodium 149 mmol/L      Potassium 4 7 mmol/L      Chloride 103 mmol/L      CO2 40 mmol/L      ANION GAP 6 mmol/L      BUN 39 mg/dL      Creatinine 1 32 mg/dL      Glucose 164 mg/dL      Calcium 9 1 mg/dL      Corrected Calcium 9 7 mg/dL      AST 36 U/L      ALT 32 U/L Alkaline Phosphatase 49 U/L      Total Protein 6 8 g/dL      Albumin 3 2 g/dL      Total Bilirubin 0 52 mg/dL      eGFR 61 ml/min/1 73sq m     Narrative:      [place] guidelines for Chronic Kidney Disease (CKD):   •  Stage 1 with normal or high GFR (GFR > 90 mL/min/1 73 square meters)  •  Stage 2 Mild CKD (GFR = 60-89 mL/min/1 73 square meters)  •  Stage 3A Moderate CKD (GFR = 45-59 mL/min/1 73 square meters)  •  Stage 3B Moderate CKD (GFR = 30-44 mL/min/1 73 square meters)  •  Stage 4 Severe CKD (GFR = 15-29 mL/min/1 73 square meters)  •  Stage 5 End Stage CKD (GFR <15 mL/min/1 73 square meters)  Note: GFR calculation is accurate only with a steady state creatinine    Magnesium [[id number]]  (Normal) Collected: 12/04/22 0556    Lab Status: Final result Specimen: Blood from Line, Arterial Updated: 12/04/22 0659     Magnesium 2 2 mg/dL     Phosphorus [[id number]]  (Abnormal) Collected: 12/04/22 0556    Lab Status: Final result Specimen: Blood from Line, Arterial Updated: 12/04/22 0659     Phosphorus 4 6 mg/dL     Procalcitonin [[id number]]  (Normal) Collected: 12/03/22 0458    Lab Status: Final result Specimen: Blood from Arm, Left Updated: 12/03/22 0623     Procalcitonin 0 07 ng/ml     Comprehensive metabolic panel [[id number]]  (Abnormal) Collected: 12/03/22 0458    Lab Status: Final result Specimen: Blood from Arm, Left Updated: 12/03/22 2131     Sodium 147 mmol/L      Potassium 5 5 mmol/L      Chloride 103 mmol/L      CO2 37 mmol/L      ANION GAP 7 mmol/L      BUN 32 mg/dL      Creatinine 1 31 mg/dL      Glucose 146 mg/dL      Calcium 8 4 mg/dL      Corrected Calcium 9 2 mg/dL      AST 22 U/L      ALT 28 U/L      Alkaline Phosphatase 54 U/L      Total Protein 6 8 g/dL      Albumin 3 0 g/dL      Total Bilirubin 0 50 mg/dL      eGFR 62 ml/min/1 73sq m     Narrative:      [place] guidelines for Chronic Kidney Disease (CKD):   •  Stage 1 with normal or high GFR (GFR > 90 mL/min/1 73 square meters)  •  Stage 2 Mild CKD (GFR = 60-89 mL/min/1 73 square meters)  •  Stage 3A Moderate CKD (GFR = 45-59 mL/min/1 73 square meters)  •  Stage 3B Moderate CKD (GFR = 30-44 mL/min/1 73 square meters)  •  Stage 4 Severe CKD (GFR = 15-29 mL/min/1 73 square meters)  •  Stage 5 End Stage CKD (GFR <15 mL/min/1 73 square meters)  Note: GFR calculation is accurate only with a steady state creatinine    Magnesium [[id number]]  (Normal) Collected: 12/03/22 0458    Lab Status: Final result Specimen: Blood from Arm, Left Updated: 12/03/22 0620     Magnesium 2 0 mg/dL     Phosphorus [[id number]]  (Normal) Collected: 12/03/22 0458    Lab Status: Final result Specimen: Blood from Arm, Left Updated: 12/03/22 9965     Phosphorus 4 2 mg/dL     APTT six (6) hours after Heparin bolus/drip initiation or dosing change [[id number]]  (Abnormal) Collected: 12/03/22 0458    Lab Status: Final result Specimen: Blood from Arm, Left Updated: 12/03/22 0619     PTT 62 seconds     APTT six (6) hours after Heparin bolus/drip initiation or dosing change [[id number]]  (Abnormal) Collected: 12/03/22 0049    Lab Status: Final result Specimen: Blood from Arm, Left Updated: 12/03/22 0218     PTT 69 seconds     APTT six (6) hours after Heparin bolus/drip initiation or dosing change [[id number]]  (Normal) Collected: 12/02/22 2301    Lab Status: Final result Specimen: Blood from Arm, Right Updated: 12/02/22 2324     PTT 36 seconds     Procalcitonin [[id number]]  (Normal) Collected: 12/02/22 1256    Lab Status: Final result Specimen: Blood from Arm, Left Updated: 12/02/22 1335     Procalcitonin 0 11 ng/ml     APTT six (6) hours after Heparin bolus/drip initiation or dosing change [[id number]]  (Normal) Collected: 12/02/22 1256    Lab Status: Final result Specimen: Blood from Arm, Left Updated: 12/02/22 1319     PTT 32 seconds     APTT six (6) hours after Heparin bolus/drip initiation or dosing change [[id number]]  (Normal) Collected: 12/02/22 0532    Lab Status: Final result Specimen: Blood from Arm, Left Updated: 12/02/22 0601     PTT 28 seconds     Basic metabolic panel [[id number]]  (Abnormal) Collected: 12/02/22 0448    Lab Status: Final result Specimen: Blood from Arm, Right Updated: 12/02/22 0544     Sodium 146 mmol/L      Potassium 5 0 mmol/L      Chloride 106 mmol/L      CO2 33 mmol/L      ANION GAP 7 mmol/L      BUN 33 mg/dL      Creatinine 1 48 mg/dL      Glucose 129 mg/dL      Calcium 8 6 mg/dL      eGFR 53 ml/min/1 73sq m     Narrative:      [facility] guidelines for Chronic Kidney Disease (CKD):   •  Stage 1 with normal or high GFR (GFR > 90 mL/min/1 73 square meters)  •  Stage 2 Mild CKD (GFR = 60-89 mL/min/1 73 square meters)  •  Stage 3A Moderate CKD (GFR = 45-59 mL/min/1 73 square meters)  •  Stage 3B Moderate CKD (GFR = 30-44 mL/min/1 73 square meters)  •  Stage 4 Severe CKD (GFR = 15-29 mL/min/1 73 square meters)  •  Stage 5 End Stage CKD (GFR <15 mL/min/1 73 square meters)  Note: GFR calculation is accurate only with a steady state creatinine    CBC (With Platelets) [[id number]]  (Abnormal) Collected: 12/02/22 0448    Lab Status: Final result Specimen: Blood from Arm, Right Updated: 12/02/22 0501     WBC 10 64 Thousand/uL      RBC 5 86 Million/uL      Hemoglobin 14 4 g/dL      Hematocrit 51 5 %      MCV 88 fL      MCH 24 6 pg      MCHC 28 0 g/dL      RDW 17 7 %      Platelets 958 Thousands/uL      MPV 9 1 fL     C-reactive protein [[id number]]  (Abnormal) Collected: 12/01/22 1822    Lab Status: Final result Specimen: Blood from Arm, Left Updated: 12/02/22 0134     CRP 20 2 mg/L     NT-BNP PRO [[id number]]  (Abnormal) Collected: 12/01/22 1822    Lab Status: Final result Specimen: Blood from Arm, Left Updated: 12/02/22 0134     NT-proBNP 2,061 pg/mL     Magnesium [[id number]]  (Normal) Collected: 12/01/22 1822    Lab Status: Final result Specimen: Blood from Arm, Left Updated: 12/02/22 0134     Magnesium 2 2 mg/dL CK (with reflex to MB) [[id number]]  (Normal) Collected: 12/01/22 1822    Lab Status: Final result Specimen: Blood from Arm, Left Updated: 12/02/22 0132     Total CK 67 U/L     HS Troponin I 4hr [[id number]]  (Normal) Collected: 12/01/22 2238    Lab Status: Final result Specimen: Blood from Arm, Left Updated: 12/01/22 2313     hs TnI 4hr 23 ng/L      Delta 4hr hsTnI 5 ng/L     APTT [[id number]]  (Abnormal) Collected: 12/01/22 2238    Lab Status: Final result Specimen: Blood from Arm, Left Updated: 12/01/22 2308     PTT 22 seconds     Protime-INR [[id number]]  (Abnormal) Collected: 12/01/22 2238    Lab Status: Final result Specimen: Blood from Arm, Left Updated: 12/01/22 2308     Protime 14 7 seconds      INR 1 18    HS Troponin I 2hr [[id number]]  (Normal) Collected: 12/01/22 2026    Lab Status: Final result Specimen: Blood from Arm, Left Updated: 12/01/22 2107     hs TnI 2hr 19 ng/L      Delta 2hr hsTnI 1 ng/L     FLU/RSV/COVID - if FLU/RSV clinically relevant [[id number]]  (Abnormal) Collected: 12/01/22 1857    Lab Status: Final result Specimen: Nares from Nose Updated: 12/01/22 1946     SARS-CoV-2 Positive     INFLUENZA A PCR Negative     INFLUENZA B PCR Negative     RSV PCR Negative    Narrative:      FOR PEDIATRIC PATIENTS - copy/paste COVID Guidelines URL to browser: [email or web address]    SARS-CoV-2 assay is a Nucleic Acid Amplification assay intended for the  qualitative detection of nucleic acid from SARS-CoV-2 in nasopharyngeal  swabs  Results are for the presumptive identification of SARS-CoV-2 RNA  Positive results are indicative of infection with SARS-CoV-2, the virus  causing COVID-19, but do not rule out bacterial infection or co-infection  with other viruses  Laboratories within the [place] and its  territories are required to report all positive results to the appropriate  public health authorities   Negative results do not preclude SARS-CoV-2  infection and should not be used as the sole basis for treatment or other  patient management decisions  Negative results must be combined with  clinical observations, patient history, and epidemiological information  This test has not been FDA cleared or approved  This test has been authorized by FDA under an Emergency Use Authorization  (EUA)  This test is only authorized for the duration of time the  declaration that circumstances exist justifying the authorization of the  emergency use of an in vitro diagnostic tests for detection of SARS-CoV-2  virus and/or diagnosis of COVID-19 infection under section 564(b)(1) of  the Act, 21 U  S C  452WYD-6(P)(7), unless the authorization is terminated  or revoked sooner  The test has been validated but independent review by FDA  and CLIA is pending  Test performed using [employer] Gene[employer]pert: This RT-PCR assay targets N2,  a region unique to SARS-CoV-2  A conserved region in the E-gene was chosen  for pan-Sarbecovirus detection which includes SARS-CoV-2  According to CMS-2020-01-R, this platform meets the definition of high-throughput technology      Comprehensive metabolic panel [[id number]]  (Abnormal) Collected: 12/01/22 1822    Lab Status: Final result Specimen: Blood from Arm, Left Updated: 12/01/22 1904     Sodium 145 mmol/L      Potassium 4 5 mmol/L      Chloride 105 mmol/L      CO2 33 mmol/L      ANION GAP 7 mmol/L      BUN 37 mg/dL      Creatinine 1 39 mg/dL      Glucose 113 mg/dL      Calcium 8 7 mg/dL      Corrected Calcium 9 3 mg/dL      AST 24 U/L      ALT 36 U/L      Alkaline Phosphatase 68 U/L      Total Protein 7 1 g/dL      Albumin 3 2 g/dL      Total Bilirubin 0 57 mg/dL      eGFR 57 ml/min/1 73sq m     Narrative:      [place] guidelines for Chronic Kidney Disease (CKD):   •  Stage 1 with normal or high GFR (GFR > 90 mL/min/1 73 square meters)  •  Stage 2 Mild CKD (GFR = 60-89 mL/min/1 73 square meters)  •  Stage 3A Moderate CKD (GFR = 45-59 mL/min/1 73 square meters)  •  Stage 3B Moderate CKD (GFR = 30-44 mL/min/1 73 square meters)  •  Stage 4 Severe CKD (GFR = 15-29 mL/min/1 73 square meters)  •  Stage 5 End Stage CKD (GFR <15 mL/min/1 73 square meters)  Note: GFR calculation is accurate only with a steady state creatinine    HS Troponin 0hr (reflex protocol) [[id number]]  (Normal) Collected: 12/01/22 1822    Lab Status: Final result Specimen: Blood from Arm, Left Updated: 12/01/22 1903     hs TnI 0hr 18 ng/L     D-dimer, quantitative [[id number]]  (Abnormal) Collected: 12/01/22 1822    Lab Status: Final result Specimen: Blood from Arm, Left Updated: 12/01/22 1903     D-Dimer, Quant 3 50 ug/ml FEU     Narrative: In the evaluation for possible pulmonary embolism, in the appropriate (Well's Score of 4 or less) patient, the age adjusted d-dimer cutoff for this patient can be calculated as:    Age x 0 01 (in ug/mL) for Age-adjusted D-dimer exclusion threshold for a patient over 50 years      CBC and differential [[id number]]  (Abnormal) Collected: 12/01/22 1822    Lab Status: Final result Specimen: Blood from Arm, Left Updated: 12/01/22 1837     WBC 9 50 Thousand/uL      RBC 5 99 Million/uL      Hemoglobin 15 2 g/dL      Hematocrit 52 3 %      MCV 87 fL      MCH 25 4 pg      MCHC 29 1 g/dL      RDW 17 7 %      MPV 9 5 fL      Platelets 401 Thousands/uL      nRBC 0 /100 WBCs      Neutrophils Relative 65 %      Immat GRANS % 0 %      Lymphocytes Relative 21 %      Monocytes Relative 12 %      Eosinophils Relative 2 %      Basophils Relative 0 %      Neutrophils Absolute 6 11 Thousands/µL      Immature Grans Absolute 0 02 Thousand/uL      Lymphocytes Absolute 2 01 Thousands/µL      Monocytes Absolute 1 12 Thousand/µL      Eosinophils Absolute 0 20 Thousand/µL      Basophils Absolute 0 04 Thousands/µL                  XR chest portable ICU   Final Result by Jayshree Benitez MD (12/04 3578)      Compromised by body habitus and motion with no definite acute disease  Cardiomegaly  Workstation performed: [id number]         CTA ED chest PE Study   Final Result by Maye Valera MD (12/01 2118)         1  Mild to moderate cardiomegaly with small right pleural effusion  2  Enlarged pulmonary artery suggesting pulmonary hypertension  3  Subsegmental atelectasis versus pneumonia right lower lobe  4  No evidence of pulmonary embolism given limitations  5  Small perihepatic ascites  Workstation performed: [id number]         XR chest 1 view portable   Final Result by Elenore Hashimoto, MD (12/02 0825)      Cardiomegaly   Hazy density in the lungs with increased lung markings suggest congestion    Small left effusion                  Workstation performed: [id number]         VAS lower limb venous duplex study, complete bilateral    (Results Pending)              Procedures  Procedures         ED Course                                             MDM  Number of Diagnoses or Management Options  COVID  Hypoxia  Diagnosis management comments: 51-year-old male with acute hypoxic respiratory failure, suspect component of volume overload as well, will also screen for PE with D-dimer, CT if positive, continue to give supplemental oxygen as needed, check COVID swab, likely admit to the hospital       Disposition  Final diagnoses:   Hypoxia   COVID     Time reflects when diagnosis was documented in both MDM as applicable and the Disposition within this note     Time User Action Codes Description Comment    12/1/2022  9:43 PM Janie Goode Add [R09 02] Hypoxia     12/1/2022  9:43 PM Shaikh, [facility] [U07 1] COVID     12/3/2022  4:07 AM Melanie Crowe Add [R06 89] Hypercapnia     12/3/2022  2:41 PM Lili Hogan Add [U07 1,  J96 00] Acute respiratory failure due to COVID-19 ([facility] [address] )     12/12/2022 12:25 PM Williams Ards Add [I27 20] Pulmonary hypertension ([facility] [address] )     12/12/2022 12:25 PM Williams Ards Add [I48 91] A-fib [facility])       ED Disposition     ED Disposition   Admit    Condition   Stable    Date/Time   Thu Dec 1, 2022  9:44 PM    Comment   Case was discussed with WILLY and the patient's admission status was agreed to be Admission Status: inpatient status to the service of Dr Scott Vaca   Follow-up Information    None         Current Discharge Medication List      CONTINUE these medications which have NOT CHANGED    Details   famotidine (PEPCID) 40 MG tablet Take 40 mg by mouth      furosemide (LASIX) 20 mg tablet Take 20 mg by mouth      lisinopril-hydrochlorothiazide (PRINZIDE,ZESTORETIC) 20-25 MG per tablet Take 1 tablet by mouth every morning      metoprolol succinate (TOPROL-XL) 25 mg 24 hr tablet Take 25 mg by mouth      potassium chloride (Klor-Con) 10 mEq tablet Take 10 mEq by mouth daily      rosuvastatin (CRESTOR) 5 mg tablet Take 5 mg by mouth daily      sildenafil (REVATIO) 20 mg tablet Take 20 mg by mouth Three times a day             No discharge procedures on file      PDMP Review     None          ED Provider  Electronically Signed by           Belen Yin MD  12/12/22 7056

## 2022-12-13 NOTE — ASSESSMENT & PLAN NOTE
· Creatinine on admission 1 39, baseline around 1 in setting of COVID-19 infection verses possibility of onset of cardiorenal syndrome with CHF history  · On iv diuresis  · Creatinine normalised  · Monitor for now

## 2022-12-13 NOTE — RESPIRATORY THERAPY NOTE
RT Protocol Note  Brianna Koenig 46 y o  male MRN: [id number]  Unit/Bed#:  Encounter: [id number]    Assessment    Principal Problem:    Acute respiratory failure due to COVID-19 [facility])  Active Problems:    CHF (congestive heart failure) (HCC)    Mixed hyperlipidemia    Morbid obesity (HCC)    Obstructive sleep apnea    Pulmonary hypertension (HCC)    Elevated serum creatinine    Sepsis ([address] ), POA      Home Pulmonary Medications:  None    Home Devices/Therapy: BiPAP/CPAP    Past Medical History:   Diagnosis Date    CHF (congestive heart failure) (HCC)     Hypertension     Sleep apnea      Social History     Socioeconomic History    Marital status: /Civil Union     Spouse name: None    Number of children: None    Years of education: None    Highest education level: None   Occupational History    None   Tobacco Use    Smoking status: Former     Types: Cigarettes    Smokeless tobacco: Never   Substance and Sexual Activity    Alcohol use: Not Currently    Drug use: Not Currently    Sexual activity: None   Other Topics Concern    None   Social History Narrative    None     Social Determinants of Health     Financial Resource Strain: Not on file   Food Insecurity: No Food Insecurity    Worried About Running Out of Food in the Last Year: Never true    Ran Out of Food in the Last Year: Never true   Transportation Needs: No Transportation Needs    Lack of Transportation (Medical): No    Lack of Transportation (Non-Medical):  No   Physical Activity: Not on file   Stress: Not on file   Social Connections: Not on file   Intimate Partner Violence: Not on file   Housing Stability: Unknown    Unable to Pay for Housing in the Last Year: No    Number of Places Lived in the Last Year: Not on file    Unstable Housing in the Last Year: No       Subjective    Subjective Data: pt awake and alert with good spo2 decrease FIO2 at this time, will continue to titrate as tolerated, pt to wear BiPAP for HS     Objective    Physical Exam:   Assessment Type: Assess only  General Appearance: Alert, Awake  Respiratory Pattern: Normal  Chest Assessment: Chest expansion symmetrical  Bilateral Breath Sounds: Diminished  Cough: None  O2 Device: [address]    Vitals:  Blood pressure 130/99, pulse 96, temperature 98 °F (36 7 °C), temperature source Axillary, resp  rate 16, height 5' 11" (1 803 m), weight (!) 169 kg (372 lb 2 2 oz), SpO2 90 %  Results from last 7 days   Lab Units 12/08/22  1619 12/06/22  1832 12/06/22  1244   PH ART  7 384   < > 7 348*   PCO2 ART mm Hg 66 1*   < > 76 2*   PO2 ART mm Hg 66 5*   < > 95 2   HCO3 ART mmol/L 38 6*   < > 40 9*   BASE EXC ART mmol/L 10 5   < > 11 6   O2 CONTENT ART mL/dL 19 5   < > 19 9   O2 HGB, ARTERIAL % 91 7*   < > 96 1   ABG SOURCE  Line, Arterial   < > Line, Arterial   HANNAH TEST   --   --  Yes    < > = values in this interval not displayed  Imaging and other studies: I have personally reviewed pertinent reports  O2 Device: [address]     Plan    Respiratory Plan: No distress/Pulmonary history        Resp Comments: Pt remains on [address]  Liter flow decreased in an attempt to wean  Will cont to wean as able

## 2022-12-13 NOTE — PROGRESS NOTES
Cardiology Progress Note - Piper Madison 46 y o  male MRN: [id number]    Unit/Bed#:  Encounter: [id number]      Assessment/Plan:  1  Acute on chronic RV failure/ HFpEF, still volume overloaded but markedly improved  Continue with Lasix 80 IV twice daily  Net -7 L in 24 hours/ wt down 34 lbs  Daily weights  Salt restriction  Strict I's and O's  Continue Eliquis, Lipitor, Toprol, Revatio    2  Pneumonia secondary to COVID-19, still on 10 L of oxygen, management per slim  Continue with steroids, nebulizers, oxygen  3   Paroxysmal atrial fibrillation heart rate stable in the 70s to 80s  On metoprolol, Eliquis  Monitor telemetry  4   Pulmonary hypertension on sildenafil  Continue medications  She follows with outpatient pulmonary  5   Hypertension, improved  Currently 107/76  Continue with Lasix, metoprolol, Revatio    6  Hyperlipidemia on Lipitor    7  Obesity, BMI 51, weight loss advised      Subjective:   Patient seen and examined  No significant events overnight  Im feeling better today  Objective:     Vitals: Blood pressure 107/76, pulse 75, temperature 97 7 °F (36 5 °C), temperature source Axillary, resp  rate 14, height 5' 11" (1 803 m), weight (!) 169 kg (372 lb 2 2 oz), SpO2 90 %  , Body mass index is 51 9 kg/m² ,   Orthostatic Blood Pressures    Flowsheet Row Most Recent Value   Blood Pressure 107/76 filed at 12/13/2022 1200   Patient Position - Orthostatic VS Sitting filed at 12/13/2022 1200            Intake/Output Summary (Last 24 hours) at 12/13/2022 1345  Last data filed at 12/13/2022 1001  Gross per 24 hour   Intake 440 ml   Output 9850 ml   Net -9410 ml         Physical Exam:    GEN: Piper Madison appears ill, alert and oriented x 3, pleasant and cooperative   HEENT: pupils equal, round, and reactive to light; extraocular muscles intact  NECK: supple, no carotid bruits   HEART: Irregularly irregular, normal S1 and S2, no murmurs, clicks, gallops or rubs   LUNGS: Decreased breath sounds bilaterally  ABDOMEN: normal bowel sounds, soft, no tenderness, no distention  EXTREMITIES: 1 edema, peripheral pulses normal; no clubbing, cyanosis      Medications:      Current Facility-Administered Medications:   •  acetaminophen (TYLENOL) tablet 650 mg, 650 mg, Oral, Q6H PRN, Carmen Kayli, CRNP  •  albuterol (PROVENTIL HFA,VENTOLIN HFA) inhaler 2 puff, 2 puff, Inhalation, Q4H PRN, Carmen Kayli, CRNP  •  apixaban (ELIQUIS) tablet 5 mg, 5 mg, Oral, BID, Carmen Kayli, CRNP, 5 mg at 12/13/22 5493  •  atorvastatin (LIPITOR) tablet 10 mg, 10 mg, Oral, Daily With Dinner, Carmen Kayli, CRNP, 10 mg at 12/12/22 1541  •  [COMPLETED] baricitinib (OLUMIANT) tablet 2 mg, 2 mg, Oral, Q24H, 2 mg at 12/06/22 1019 **FOLLOWED BY** baricitinib (OLUMIANT) tablet 4 mg, 4 mg, Oral, Q24H, Carmen Kayli, CRNP, 4 mg at 12/13/22 0803  •  dextromethorphan-guaiFENesin (ROBITUSSIN DM) oral syrup 10 mL, 10 mL, Oral, Q4H PRN, Carmen Kayli, CRNP, 10 mL at 12/02/22 1724  •  furosemide (LASIX) injection 80 mg, 80 mg, Intravenous, BID (diuretic), Carmen Kayli, CRNP, 80 mg at 12/13/22 0803  •  metoprolol succinate (TOPROL-XL) 24 hr tablet 50 mg, 50 mg, Oral, Daily, Carmen Kayli, CRNP, 50 mg at 12/13/22 7256  •  sildenafil (REVATIO) tablet 20 mg, 20 mg, Oral, TID, Carmen Kayli, CRNP, 20 mg at 12/13/22 0805     Labs & Results:    Results from last 7 days   Lab Units 12/09/22  0531   NT-PRO BNP pg/mL 527*     Results from last 7 days   Lab Units 12/12/22  0452 12/11/22  0430 12/10/22  0311   WBC Thousand/uL 7 97 8 05 8 53   HEMOGLOBIN g/dL 14 0 13 8 14 2   HEMATOCRIT % 46 0 45 6 46 9   PLATELETS Thousands/uL 165 147* 146*         Results from last 7 days   Lab Units 12/12/22  0452 12/11/22  0430 12/10/22  0311 12/08/22  0526 12/07/22  0434   POTASSIUM mmol/L 4 4 4 6 4 7 4 6 4 4   CHLORIDE mmol/L 104 101 102 103 103   CO2 mmol/L 29 32 33* 36* 37*   BUN mg/dL 26* 29* 33* 34* 35*   CREATININE mg/dL 0 83 0 82 0 87 0  86 0 90   CALCIUM mg/dL 9 0 8 7 8 9 8 8 8 9   ALK PHOS U/L  --   --   --  39* 44*   ALT U/L  --   --   --  30 32   AST U/L  --   --   --  10 16     Results from last 7 days   Lab Units 12/12/22  1138 12/12/22  0452 12/11/22  1928   PTT seconds 98* 96* 83*     Results from last 7 days   Lab Units 12/12/22  0452 12/10/22  0311 12/08/22  0526   MAGNESIUM mg/dL 2 0 2 0 2 2

## 2022-12-13 NOTE — RESPIRATORY THERAPY NOTE
12/13/22 1409   Respiratory Assessment   Resp Comments Pt remains on [address]  Liter flow decreased in an attempt to wean  Will cont to wean as able     O2 Device MFNC   Oxygen Therapy/Pulse Ox   O2 Device Mid flow nasal cannula   O2 Therapy Oxygen humidified   Nasal Cannula O2 Flow Rate (L/min) (S)  8 L/min   Calculated FIO2 (%) - Nasal Cannula 52   SpO2 94 %   SpO2 Activity At Rest   $ Pulse Oximetry Spot Check Charge Completed

## 2022-12-13 NOTE — PROGRESS NOTES
[facility]  Progress Note Emaline Stains 1970, 46 y o  male MRN: [id number]  Unit/Bed#:  Encounter: [id number]  Primary Care Provider: No primary care provider on file  Date and time admitted to hospital: 12/1/2022  5:52 PM    * Acute respiratory failure due to COVID-19 [facility])  Assessment & Plan  + Noted O2 saturation 70% on room air, was on high flow, currently on 12 L O2 by nasal cannula  · COVID-19 PCR + 12/1/2022  · CT PE protocol consistent with cardiomegaly/right lower lobe pneumonia/pulmonary hypertension  No PE   · CRP 20 2 -><3  · ddimer 3 5 - >0 69  · S/p IV Decadron  · S/p remdesivir  · On baricitinib [day 12/14]  · Initially on heparin drip - > eliquis  · Procalcitonin x2 negative  CHF (congestive heart failure) ([facility])  Assessment & Plan  · CT PE protocol noting evidence of pulmonary hypertension  · Echocardiogram notes right ventricular cavity size severely dilated  · Concern for acute on chronic right heart failure exacerbation  · Continue IV Lasix 80 twice daily  · -13 6L so far, wt is down 34lbs since admit  · Appreciate cardiology input            Elevated serum creatinine  Assessment & Plan  · Creatinine on admission 1 39, baseline around 1 in setting of COVID-19 infection verses possibility of onset of cardiorenal syndrome with CHF history  · On iv diuresis  · Creatinine normalised  · Monitor for now    Obstructive sleep apnea  Assessment & Plan  · BIPAP QHS  · Needs OP sleep study    Pulmonary hypertension ([facility] [address] )  Assessment & Plan  · Pulmonary hypertension noted on CTA chest, patient with history  · Continue home sildenafil t i d     Morbid obesity ([address] )  Assessment & Plan  · Encourage weight loss    Mixed hyperlipidemia  Assessment & Plan  · On statin      VTE Pharmacologic Prophylaxis:   Pharmacologic: Apixaban (Eliquis)  Mechanical VTE Prophylaxis in Place: Yes    Patient Centered Rounds: I have performed bedside rounds with nursing staff today     Discussions with Specialists or Other Care Team Provider: cardiology    Education and Discussions with Family / Patient: dw patient    Time Spent for Care: 30 minutes  More than 50% of total time spent on counseling and coordination of care as described above  Current Length of Stay: 12 day(s)    Current Patient Status: Inpatient   Certification Statement: The patient will continue to require additional inpatient hospital stay due to iv diuresis    Discharge Plan: probable 2-3 days    Code Status: Level 1 - Full Code      Subjective:   Patient on 12 L O2 via nasal cannula  Not in any respiratory distress  Denies any significant shortness of breath or productive cough  Would like to establish with local PCP/pulmonology cardiology  Case management made aware  Objective:     Vitals:   Temp (24hrs), Av °F (36 7 °C), Min:97 7 °F (36 5 °C), Max:98 2 °F (36 8 °C)    Temp:  [97 7 °F (36 5 °C)-98 2 °F (36 8 °C)] 97 7 °F (36 5 °C)  HR:  [] 75  Resp:  [14-16] 14  BP: ()/(59-99) 107/76  SpO2:  [90 %-95 %] 94 %  Body mass index is 51 9 kg/m²  Input and Output Summary (last 24 hours): Intake/Output Summary (Last 24 hours) at 2022 1420  Last data filed at 2022 1001  Gross per 24 hour   Intake 440 ml   Output 8950 ml   Net -8510 ml       Physical Exam:     Physical Exam  Constitutional:       General: He is not in acute distress  Appearance: He is obese  He is not toxic-appearing  HENT:      Mouth/Throat:      Mouth: Mucous membranes are moist       Pharynx: Oropharynx is clear  Cardiovascular:      Rate and Rhythm: Normal rate and regular rhythm  Pulses: Normal pulses  Pulmonary:      Effort: Pulmonary effort is normal  No respiratory distress  Breath sounds: Normal breath sounds  No wheezing  Abdominal:      General: Bowel sounds are normal  There is distension  Tenderness: There is no abdominal tenderness     Musculoskeletal:      Left lower leg: Edema present  Neurological:      General: No focal deficit present  Mental Status: He is alert and oriented to person, place, and time  Additional Data:     Labs:    Results from last 7 days   Lab Units 12/12/22  0452   WBC Thousand/uL 7 97   HEMOGLOBIN g/dL 14 0   HEMATOCRIT % 46 0   PLATELETS Thousands/uL 165   NEUTROS PCT % 90*   LYMPHS PCT % 4*   MONOS PCT % 5   EOS PCT % 0     Results from last 7 days   Lab Units 12/12/22  0452 12/10/22  0311 12/08/22  0526   SODIUM mmol/L 140   < > 143   POTASSIUM mmol/L 4 4   < > 4 6   CHLORIDE mmol/L 104   < > 103   CO2 mmol/L 29   < > 36*   BUN mg/dL 26*   < > 34*   CREATININE mg/dL 0 83   < > 0 86   ANION GAP mmol/L 7   < > 4   CALCIUM mg/dL 9 0   < > 8 8   ALBUMIN g/dL  --   --  2 9*   TOTAL BILIRUBIN mg/dL  --   --  1 06*   ALK PHOS U/L  --   --  39*   ALT U/L  --   --  30   AST U/L  --   --  10   GLUCOSE RANDOM mg/dL 159*   < > 154*    < > = values in this interval not displayed  * I Have Reviewed All Lab Data Listed Above  * Additional Pertinent Lab Tests Reviewed:  All Labs Within Last 24 Hours Reviewed      Recent Cultures (last 7 days):           Last 24 Hours Medication List:   Current Facility-Administered Medications   Medication Dose Route Frequency Provider Last Rate   • acetaminophen  650 mg Oral Q6H PRN ELIZABETH Nieto     • albuterol  2 puff Inhalation Q4H PRN ELIZABETH Nieto     • apixaban  5 mg Oral BID ELIZABETH Nieto     • atorvastatin  10 mg Oral Daily With [address], ELIZABETH     • baricitinib  4 mg Oral Q24H ELIZABETH Nieto     • dextromethorphan-guaiFENesin  10 mL Oral Q4H PRN ELIZABETH Nieto     • furosemide  80 mg Intravenous BID (diuretic) ELIZABTEH Nieto     • metoprolol succinate  50 mg Oral Daily ELIZABETH Nieto     • sildenafil  20 mg Oral TID ELIZABETH Nieto          Today, Patient Was Seen By: KENYA Gerber      ** Please Note: Dictation voice to text software may have been used in the creation of this document   **

## 2022-12-13 NOTE — RESPIRATORY THERAPY NOTE
12/13/22 0813   Respiratory Assessment   Assessment Type Assess only   General Appearance Alert; Awake   Respiratory Pattern Normal   Chest Assessment Chest expansion symmetrical   Bilateral Breath Sounds Diminished   Cough None   Resp Comments Pt remains on [address]  Liter flow decreased in an attempt to wean  Will cont to wean as able     O2 Device MFNC   Oxygen Therapy/Pulse Ox   O2 Device Mid flow nasal cannula   O2 Therapy Oxygen humidified   Nasal Cannula O2 Flow Rate (L/min) (S)  10 L/min   Calculated FIO2 (%) - Nasal Cannula 60   SpO2 90 %   SpO2 Activity At Rest   $ Pulse Oximetry Spot Check Charge Completed

## 2022-12-13 NOTE — PLAN OF CARE
Problem: Potential for Falls  Goal: Patient will remain free of falls  Description: INTERVENTIONS:  - Educate patient/family on patient safety including physical limitations  - Instruct patient to call for assistance with activity   - Consult OT/PT to assist with strengthening/mobility   - Keep Call bell within reach  - Keep bed low and locked with side rails adjusted as appropriate  - Keep care items and personal belongings within reach  - Initiate and maintain comfort rounds  - Make Fall Risk Sign visible to staff  - Offer Toileting every 2 Hours, in advance of need  - Initiate/Maintain bed alarm  - Obtain necessary fall risk management equipment: yes   - Apply yellow socks and bracelet for high fall risk patients  - Consider moving patient to room near nurses station  Outcome: Progressing     Problem: Nutrition/Hydration-ADULT  Goal: Nutrient/Hydration intake appropriate for improving, restoring or maintaining nutritional needs  Description: Monitor and assess patient's nutrition/hydration status for malnutrition  Collaborate with interdisciplinary team and initiate plan and interventions as ordered  Monitor patient's weight and dietary intake as ordered or per policy  Utilize nutrition screening tool and intervene as necessary  Determine patient's food preferences and provide high-protein, high-caloric foods as appropriate       INTERVENTIONS:  - Monitor oral intake, urinary output, labs, and treatment plans  - Assess nutrition and hydration status and recommend course of action  - Evaluate amount of meals eaten  - Assist patient with eating if necessary   - Allow adequate time for meals  - Recommend/ encourage appropriate diets, oral nutritional supplements, and vitamin/mineral supplements  - Order, calculate, and assess calorie counts as needed  - Recommend, monitor, and adjust tube feedings and TPN/PPN based on assessed needs  - Assess need for intravenous fluids  - Provide specific nutrition/hydration education as appropriate  - Include patient/family/caregiver in decisions related to nutrition  Outcome: Progressing     Problem: MOBILITY - ADULT  Goal: Maintain or return to baseline ADL function  Description: INTERVENTIONS:  -  Assess patient's ability to carry out ADLs; assess patient's baseline for ADL function and identify physical deficits which impact ability to perform ADLs (bathing, care of mouth/teeth, toileting, grooming, dressing, etc )  - Assess/evaluate cause of self-care deficits   - Assess range of motion  - Assess patient's mobility; develop plan if impaired  - Assess patient's need for assistive devices and provide as appropriate  - Encourage maximum independence but intervene and supervise when necessary  - Involve family in performance of ADLs  - Assess for home care needs following discharge   - Consider OT consult to assist with ADL evaluation and planning for discharge  - Provide patient education as appropriate  Outcome: Progressing     Problem: Prexisting or High Potential for Compromised Skin Integrity  Goal: Skin integrity is maintained or improved  Description: INTERVENTIONS:  - Identify patients at risk for skin breakdown  - Assess and monitor skin integrity  - Assess and monitor nutrition and hydration status  - Monitor labs   - Assess for incontinence   - Turn and reposition patient  - Assist with mobility/ambulation  - Relieve pressure over bony prominences  - Avoid friction and shearing  - Provide appropriate hygiene as needed including keeping skin clean and dry  - Evaluate need for skin moisturizer/barrier cream  - Collaborate with interdisciplinary team   - Patient/family teaching  - Consider wound care consult   Outcome: Progressing     Problem: CARDIOVASCULAR - ADULT  Goal: Maintains optimal cardiac output and hemodynamic stability  Description: INTERVENTIONS:  - Monitor I/O, vital signs and rhythm  - Monitor for S/S and trends of decreased cardiac output  - Administer and titrate ordered vasoactive medications to optimize hemodynamic stability  - Assess quality of pulses, skin color and temperature  - Assess for signs of decreased coronary artery perfusion  - Instruct patient to report change in severity of symptoms  Outcome: Progressing  Goal: Absence of cardiac dysrhythmias or at baseline rhythm  Description: INTERVENTIONS:  - Continuous cardiac monitoring, vital signs, obtain 12 lead EKG if ordered  - Administer antiarrhythmic and heart rate control medications as ordered  - Monitor electrolytes and administer replacement therapy as ordered  Outcome: Progressing     Problem: RESPIRATORY - ADULT  Goal: Achieves optimal ventilation and oxygenation  Description: INTERVENTIONS:  - Assess for changes in respiratory status  - Assess for changes in mentation and behavior  - Position to facilitate oxygenation and minimize respiratory effort  - Oxygen administered by appropriate delivery if ordered  - Initiate smoking cessation education as indicated  - Encourage broncho-pulmonary hygiene including cough, deep breathe, Incentive Spirometry  - Assess the need for suctioning and aspirate as needed  - Assess and instruct to report SOB or any respiratory difficulty  - Respiratory Therapy support as indicated  Outcome: Progressing     Problem: GENITOURINARY - ADULT  Goal: Urinary catheter remains patent  Description: INTERVENTIONS:  - Assess patency of urinary catheter  - If patient has a chronic diallo, consider changing catheter if non-functioning  - Follow guidelines for intermittent irrigation of non-functioning urinary catheter  Outcome: Progressing     Problem: METABOLIC, FLUID AND ELECTROLYTES - ADULT  Goal: Electrolytes maintained within normal limits  Description: INTERVENTIONS:  - Monitor labs and assess patient for signs and symptoms of electrolyte imbalances  - Administer electrolyte replacement as ordered  - Monitor response to electrolyte replacements, including repeat lab results as appropriate  - Instruct patient on fluid and nutrition as appropriate  Outcome: Progressing  Goal: Glucose maintained within target range  Description: INTERVENTIONS:  - Monitor Blood Glucose as ordered  - Assess for signs and symptoms of hyperglycemia and hypoglycemia  - Administer ordered medications to maintain glucose within target range  - Assess nutritional intake and initiate nutrition service referral as needed  Outcome: Progressing

## 2022-12-14 ENCOUNTER — TELEPHONE (OUTPATIENT)
Dept: CARDIOLOGY CLINIC | Facility: CLINIC | Age: 52
End: 2022-12-14

## 2022-12-14 LAB
ANION GAP SERPL CALCULATED.3IONS-SCNC: 7 MMOL/L (ref 4–13)
BASOPHILS # BLD AUTO: 0 THOUSANDS/ÂΜL (ref 0–0.1)
BASOPHILS NFR BLD AUTO: 0 % (ref 0–1)
BUN SERPL-MCNC: 35 MG/DL (ref 5–25)
CALCIUM SERPL-MCNC: 8.8 MG/DL (ref 8.3–10.1)
CHLORIDE SERPL-SCNC: 100 MMOL/L (ref 96–108)
CO2 SERPL-SCNC: 33 MMOL/L (ref 21–32)
CREAT SERPL-MCNC: 1.19 MG/DL (ref 0.6–1.3)
CRP SERPL QL: <3 MG/L
D DIMER PPP FEU-MCNC: 0.84 UG/ML FEU
EOSINOPHIL # BLD AUTO: 0.19 THOUSAND/ÂΜL (ref 0–0.61)
EOSINOPHIL NFR BLD AUTO: 2 % (ref 0–6)
ERYTHROCYTE [DISTWIDTH] IN BLOOD BY AUTOMATED COUNT: 14.8 % (ref 11.6–15.1)
GFR SERPL CREATININE-BSD FRML MDRD: 69 ML/MIN/1.73SQ M
GLUCOSE SERPL-MCNC: 87 MG/DL (ref 65–140)
HCT VFR BLD AUTO: 49 % (ref 36.5–49.3)
HGB BLD-MCNC: 14.7 G/DL (ref 12–17)
IMM GRANULOCYTES # BLD AUTO: 0.05 THOUSAND/UL (ref 0–0.2)
IMM GRANULOCYTES NFR BLD AUTO: 1 % (ref 0–2)
LYMPHOCYTES # BLD AUTO: 2.08 THOUSANDS/ÂΜL (ref 0.6–4.47)
LYMPHOCYTES NFR BLD AUTO: 23 % (ref 14–44)
MCH RBC QN AUTO: 24 PG (ref 26.8–34.3)
MCHC RBC AUTO-ENTMCNC: 30 G/DL (ref 31.4–37.4)
MCV RBC AUTO: 80 FL (ref 82–98)
MONOCYTES # BLD AUTO: 1.2 THOUSAND/ÂΜL (ref 0.17–1.22)
MONOCYTES NFR BLD AUTO: 13 % (ref 4–12)
NEUTROPHILS # BLD AUTO: 5.68 THOUSANDS/ÂΜL (ref 1.85–7.62)
NEUTS SEG NFR BLD AUTO: 61 % (ref 43–75)
NRBC BLD AUTO-RTO: 0 /100 WBCS
PLATELET # BLD AUTO: 203 THOUSANDS/UL (ref 149–390)
PMV BLD AUTO: 10.9 FL (ref 8.9–12.7)
POTASSIUM SERPL-SCNC: 3.9 MMOL/L (ref 3.5–5.3)
RBC # BLD AUTO: 6.12 MILLION/UL (ref 3.88–5.62)
SODIUM SERPL-SCNC: 140 MMOL/L (ref 135–147)
WBC # BLD AUTO: 9.2 THOUSAND/UL (ref 4.31–10.16)

## 2022-12-14 RX ADMIN — APIXABAN 5 MG: 5 TABLET, FILM COATED ORAL at 09:35

## 2022-12-14 RX ADMIN — SILDENAFIL 20 MG: 20 TABLET ORAL at 09:35

## 2022-12-14 RX ADMIN — SILDENAFIL 20 MG: 20 TABLET ORAL at 15:25

## 2022-12-14 RX ADMIN — ATORVASTATIN CALCIUM 10 MG: 10 TABLET, FILM COATED ORAL at 15:31

## 2022-12-14 RX ADMIN — BARICITINIB 4 MG: 2 TABLET, FILM COATED ORAL at 09:35

## 2022-12-14 RX ADMIN — FUROSEMIDE 80 MG: 10 INJECTION, SOLUTION INTRAMUSCULAR; INTRAVENOUS at 09:00

## 2022-12-14 RX ADMIN — FUROSEMIDE 80 MG: 10 INJECTION, SOLUTION INTRAMUSCULAR; INTRAVENOUS at 15:24

## 2022-12-14 RX ADMIN — METOPROLOL SUCCINATE 50 MG: 50 TABLET, EXTENDED RELEASE ORAL at 09:35

## 2022-12-14 RX ADMIN — SILDENAFIL 20 MG: 20 TABLET ORAL at 21:44

## 2022-12-14 RX ADMIN — APIXABAN 5 MG: 5 TABLET, FILM COATED ORAL at 17:58

## 2022-12-14 NOTE — ASSESSMENT & PLAN NOTE
+ Noted O2 saturation 70% on room air, was on high flow, currently on 8L O2 by nasal cannula  · COVID-19 PCR + 12/1/2022  · CT PE protocol consistent with cardiomegaly/right lower lobe pneumonia/pulmonary hypertension  No PE   · CRP 20 2 -><3  · ddimer 3 5 - >0 69  · S/p IV Decadron  · S/p remdesivir  · On baricitinib [day 13/14]  · Initially on heparin drip - > now transitioned to eliquis  · Procalcitonin x2 negative

## 2022-12-14 NOTE — ASSESSMENT & PLAN NOTE
· CT PE protocol noting evidence of pulmonary hypertension  · Echocardiogram notes right ventricular cavity size severely dilated  · Concern for acute on chronic right heart failure exacerbation  · Continue IV Lasix 80 twice daily  · -13 4L so far  · Appreciate cardiology input

## 2022-12-14 NOTE — OCCUPATIONAL THERAPY NOTE
Occupational Therapy Progress Note     Patient Name: Rohit BUCIO Date: 12/14/2022      Problem List  Principal Problem:    Acute respiratory failure due to COVID-19 [facility])  Active Problems:    CHF (congestive heart failure) ([facility] )    Mixed hyperlipidemia    Morbid obesity ([facility] )    Obstructive sleep apnea    Pulmonary hypertension (HCC)    Elevated serum creatinine        12/14/22 1059   OT Last Visit   OT Visit Date 12/14/22   Note Type   Note Type Treatment   Pain Assessment   Pain Assessment Tool 0-10   Pain Score No Pain   Restrictions/Precautions   Weight Bearing Precautions Per Order No   Other Precautions Airborne/isolation;Multiple lines;Telemetry;O2   ADL   Eating Assistance 7  Independent   Grooming Assistance 5  Supervision/Setup   Grooming Deficit Setup; Teeth care  (performed seated this date 2/2 elevated HR to 140s with standing/ambulation)   UB Bathing Assistance 5  Supervision/Setup   UB Bathing Deficit Setup  (while seated)   LB Bathing Assistance 5  Supervision/Setup   LB Bathing Deficit Setup;Supervision/safety; Increased time to complete  (while seated)   UB Dressing Assistance 6  Modified independent   UB Dressing Deficit Setup   Toileting Assistance  6  Modified independent   Toileting Deficit Setup  (use of urinal)   Functional Standing Tolerance   Time ~3 minutes   Activity static standing, functional ambulation (time limited due to elevated HR, pt asymtomatic and not fatigued)   Comments without AD   Bed Mobility   Additional Comments Pt received OOB in bed side chair   Transfers   Sit to Stand 6  Modified independent   Additional items Armrests  (without AD)   Stand to Sit 6  Modified independent   Additional items Armrests   Stand pivot 6  Modified independent   Additional items   (without AD, assist for O2 line management)   Functional Mobility   Functional Mobility 6  Modified independent   Additional items   (without Ad)   Therapeutic Excerise-Strength   UE Strength Yes Right Upper Extremity- Strength   R Shoulder Flexion; Horizontal ABduction   R Elbow Elbow flexion;Elbow extension   R Position Seated;Against gravity   R Weight/Reps/Sets 10 reps x1 set   Left Upper Extremity-Strength   L Shoulder Flexion; Horizontal ABduction   L Elbow Elbow flexion;Elbow extension   L Position Against gravity   L Weights/Reps/Sets 10 reps x1 set   Subjective   Subjective "I feel pretty good " Pt agreeable to therapy session   Cognition   Overall Cognitive Status [facility]   Arousal/Participation Alert; Cooperative   Attention Within functional limits   Orientation Level Oriented X4   Memory Within functional limits   Following Commands Follows all commands and directions without difficulty   Activity Tolerance   Activity Tolerance Patient tolerated treatment well;Treatment limited secondary to medical complications (Comment)  (elevated HR  90s at rest  Up to 140s with ambulation)   Medical Staff Made Aware RN, CM   Assessment   Assessment Pt seen this date for skilled OT session focused on ADLs, functional transfers and mobility, safety education  The patient was received seated in bed side chair, NAD, PIV access, tele, 8L O2 NC salter  He participated in functional ambulation and self care ADL this date  Pt was modified independent to supervision for line management during functional ambulation without AD, and supervision with set up for self care tasks  Session was somewhat limited today due to elevated HR up to 140s with ambulation, pt asymptomatic  SpO2 maintained >90% throughout activity  At end of session the patient was located seated in bed side chair with call bell in reach and all needs met  Overall the patient remains slightly below his functional baseline, and is primarily limited at this time due to increased O2 requirements, and elevated HR with ambulation  OT will continue to follow while acute to address POC   At this time, recommend no further OT needs upon d/c    Plan   Treatment Interventions ADL retraining;Functional transfer training; Endurance training;UE strengthening/ROM; Patient/family training   Goal Expiration Date 12/22/22   OT Treatment Day 1   OT Frequency 2-3x/wk   Recommendation   OT Discharge Recommendation No rehabilitation needs   Additional Comments  The patient's raw score on the AM-PAC Daily Activity inpatient short form is 21, standardized score is 44 27, greater than 39 4  Patients at this level are likely to benefit from discharge to home  Please refer to the recommendation of the Occupational Therapist for safe discharge planning     AM-PAC Daily Activity Inpatient   Lower Body Dressing 3   Bathing 3   Toileting 4   Upper Body Dressing 4   Grooming 3   Eating 4   Daily Activity Raw Score 21   Daily Activity Standardized Score (Calc for Raw Score >=11) 44 27   AM-[facility] Applied Cognition Inpatient   Following a Speech/Presentation 4   Understanding Ordinary Conversation 4   Taking Medications 4   Remembering Where Things Are Placed or Put Away 4   Remembering List of 4-5 Errands 4   Taking Care of Complicated Tasks 4   Applied Cognition Raw Score 24   Applied Cognition Standardized Score 62 21       Amari Weiss, QUINCYR/L

## 2022-12-14 NOTE — TELEPHONE ENCOUNTER
----- Message from Rosy Chavira PA-C sent at 2022  3:36 PM EST -----  [facility]  Cardiology Follow-up:    Patient clinical visit in 1 week at the [address] [place] [address] location: [place] office       Schedule visit with Cardio Rodger Providers: Clayton Givens  Type of Visit: VISIT TYPE: in-person office visit  Test ordered: Cardiac tests:       Additional Details: 1 week chf, pt still admitted

## 2022-12-14 NOTE — PLAN OF CARE
Problem: Potential for Falls  Goal: Patient will remain free of falls  Description: INTERVENTIONS:  - Educate patient/family on patient safety including physical limitations  - Instruct patient to call for assistance with activity   - Consult OT/PT to assist with strengthening/mobility   - Keep Call bell within reach  - Keep bed low and locked with side rails adjusted as appropriate  - Keep care items and personal belongings within reach  - Initiate and maintain comfort rounds  - Make Fall Risk Sign visible to staff  - Offer Toileting every 2 Hours, in advance of need  - Initiate/Maintain bed and chair alarm  - Obtain necessary fall risk management equipment: yes  - Apply yellow socks and bracelet for high fall risk patients  - Consider moving patient to room near nurses station  Outcome: Progressing     Problem: Nutrition/Hydration-ADULT  Goal: Nutrient/Hydration intake appropriate for improving, restoring or maintaining nutritional needs  Description: Monitor and assess patient's nutrition/hydration status for malnutrition  Collaborate with interdisciplinary team and initiate plan and interventions as ordered  Monitor patient's weight and dietary intake as ordered or per policy  Utilize nutrition screening tool and intervene as necessary  Determine patient's food preferences and provide high-protein, high-caloric foods as appropriate       INTERVENTIONS:  - Monitor oral intake, urinary output, labs, and treatment plans  - Assess nutrition and hydration status and recommend course of action  - Evaluate amount of meals eaten  - Assist patient with eating if necessary   - Allow adequate time for meals  - Recommend/ encourage appropriate diets, oral nutritional supplements, and vitamin/mineral supplements  - Order, calculate, and assess calorie counts as needed  - Recommend, monitor, and adjust tube feedings and TPN/PPN based on assessed needs  - Assess need for intravenous fluids  - Provide specific nutrition/hydration education as appropriate  - Include patient/family/caregiver in decisions related to nutrition  Outcome: Progressing     Problem: MOBILITY - ADULT  Goal: Maintain or return to baseline ADL function  Description: INTERVENTIONS:  -  Assess patient's ability to carry out ADLs; assess patient's baseline for ADL function and identify physical deficits which impact ability to perform ADLs (bathing, care of mouth/teeth, toileting, grooming, dressing, etc )  - Assess/evaluate cause of self-care deficits   - Assess range of motion  - Assess patient's mobility; develop plan if impaired  - Assess patient's need for assistive devices and provide as appropriate  - Encourage maximum independence but intervene and supervise when necessary  - Involve family in performance of ADLs  - Assess for home care needs following discharge   - Consider OT consult to assist with ADL evaluation and planning for discharge  - Provide patient education as appropriate  Outcome: Progressing     Problem: Prexisting or High Potential for Compromised Skin Integrity  Goal: Skin integrity is maintained or improved  Description: INTERVENTIONS:  - Identify patients at risk for skin breakdown  - Assess and monitor skin integrity  - Assess and monitor nutrition and hydration status  - Monitor labs   - Assess for incontinence   - Turn and reposition patient  - Assist with mobility/ambulation  - Relieve pressure over bony prominences  - Avoid friction and shearing  - Provide appropriate hygiene as needed including keeping skin clean and dry  - Evaluate need for skin moisturizer/barrier cream  - Collaborate with interdisciplinary team   - Patient/family teaching  - Consider wound care consult   Outcome: Progressing     Problem: CARDIOVASCULAR - ADULT  Goal: Maintains optimal cardiac output and hemodynamic stability  Description: INTERVENTIONS:  - Monitor I/O, vital signs and rhythm  - Monitor for S/S and trends of decreased cardiac output  - Administer and titrate ordered vasoactive medications to optimize hemodynamic stability  - Assess quality of pulses, skin color and temperature  - Assess for signs of decreased coronary artery perfusion  - Instruct patient to report change in severity of symptoms  Outcome: Progressing  Goal: Absence of cardiac dysrhythmias or at baseline rhythm  Description: INTERVENTIONS:  - Continuous cardiac monitoring, vital signs, obtain 12 lead EKG if ordered  - Administer antiarrhythmic and heart rate control medications as ordered  - Monitor electrolytes and administer replacement therapy as ordered  Outcome: Progressing     Problem: RESPIRATORY - ADULT  Goal: Achieves optimal ventilation and oxygenation  Description: INTERVENTIONS:  - Assess for changes in respiratory status  - Assess for changes in mentation and behavior  - Position to facilitate oxygenation and minimize respiratory effort  - Oxygen administered by appropriate delivery if ordered  - Initiate smoking cessation education as indicated  - Encourage broncho-pulmonary hygiene including cough, deep breathe, Incentive Spirometry  - Assess the need for suctioning and aspirate as needed  - Assess and instruct to report SOB or any respiratory difficulty  - Respiratory Therapy support as indicated  Outcome: Progressing     Problem: GENITOURINARY - ADULT  Goal: Urinary catheter remains patent  Description: INTERVENTIONS:  - Assess patency of urinary catheter  - If patient has a chronic diallo, consider changing catheter if non-functioning  - Follow guidelines for intermittent irrigation of non-functioning urinary catheter  Outcome: Progressing     Problem: METABOLIC, FLUID AND ELECTROLYTES - ADULT  Goal: Electrolytes maintained within normal limits  Description: INTERVENTIONS:  - Monitor labs and assess patient for signs and symptoms of electrolyte imbalances  - Administer electrolyte replacement as ordered  - Monitor response to electrolyte replacements, including repeat lab results as appropriate  - Instruct patient on fluid and nutrition as appropriate  Outcome: Progressing  Goal: Glucose maintained within target range  Description: INTERVENTIONS:  - Monitor Blood Glucose as ordered  - Assess for signs and symptoms of hyperglycemia and hypoglycemia  - Administer ordered medications to maintain glucose within target range  - Assess nutritional intake and initiate nutrition service referral as needed  Outcome: Progressing

## 2022-12-14 NOTE — CASE MANAGEMENT
Case Management Progress Note    Patient name Cholo Jerome  Location / MRN [id number]  : 1970 Date 2022       LOS (days): 13  Geometric Mean LOS (GMLOS) (days): 5 00  Days to GMLOS:-7 5        OBJECTIVE:        Current admission status: Inpatient  Preferred Pharmacy:   PATIENT/FAMILY REPORTS NO PREFERRED PHARMACY  No address on file      Primary Care Provider: No primary care provider on file  Primary Insurance: BLUE CROSS  Secondary Insurance:     PROGRESS NOTE:  No accepting [facility] facilities at this time  Referral radius expanded   sent message in [place] to 37 providers checking for bed availability  Pending responses

## 2022-12-14 NOTE — PROGRESS NOTES
[facility]  Progress Note Kristina Burns 1970, 46 y o  male MRN: [id number]  Unit/Bed#:  Encounter: [id number]  Primary Care Provider: No primary care provider on file  Date and time admitted to hospital: 12/1/2022  5:52 PM    * Acute respiratory failure due to COVID-19 [facility])  Assessment & Plan  + Noted O2 saturation 70% on room air, was on high flow, currently on 8L O2 by nasal cannula  · COVID-19 PCR + 12/1/2022  · CT PE protocol consistent with cardiomegaly/right lower lobe pneumonia/pulmonary hypertension  No PE   · CRP 20 2 -><3  · ddimer 3 5 - >0 69  · S/p IV Decadron  · S/p remdesivir  · On baricitinib [day 13/14]  · Initially on heparin drip - > now transitioned to eliquis  · Procalcitonin x2 negative  CHF (congestive heart failure) ([facility])  Assessment & Plan  · CT PE protocol noting evidence of pulmonary hypertension  · Echocardiogram notes right ventricular cavity size severely dilated  · Concern for acute on chronic right heart failure exacerbation  · Continue IV Lasix 80 twice daily  · -13 4L so far  · Appreciate cardiology input            Elevated serum creatinine  Assessment & Plan  · Creatinine on admission 1 39, baseline around 1 in setting of COVID-19 infection verses possibility of onset of cardiorenal syndrome with CHF history  · On iv diuresis  · Creatinine normalised  · Monitor for now    Obstructive sleep apnea  Assessment & Plan  · BIPAP QHS  · Needs OP sleep study    Pulmonary hypertension ([facility] )  Assessment & Plan  · Pulmonary hypertension noted on CTA chest, patient with history  · Continue home sildenafil t i d     Morbid obesity ([facility] )  Assessment & Plan  · Encouraged weight loss    Mixed hyperlipidemia  Assessment & Plan  · On statin      VTE Pharmacologic Prophylaxis:   Pharmacologic: Apixaban (Eliquis)  Mechanical VTE Prophylaxis in Place: No    Discussions with Specialists or Other Care Team Provider: rupali    Education and Discussions with Family / Patient: dw patient    Time Spent for Care: 30 minutes  More than 50% of total time spent on counseling and coordination of care as described above  Current Length of Stay: 13 day(s)    Current Patient Status: Inpatient   Certification Statement: The patient will continue to require additional inpatient hospital stay due to needs iv diuresis    Discharge Plan: ?48hrs    Code Status: Level 1 - Full Code      Subjective:   Down to 8 L O2 via NC  Denies any sx of chest pain shortness of breath palpitations dizziness lightheadedness  Objective:     Vitals:   Temp (24hrs), Av 8 °F (36 6 °C), Min:97 7 °F (36 5 °C), Max:97 8 °F (36 6 °C)    Temp:  [97 7 °F (36 5 °C)-97 8 °F (36 6 °C)] 97 8 °F (36 6 °C)  HR:  [] 101  Resp:  [14-16] 16  BP: (100-124)/(59-82) 124/82  SpO2:  [90 %-94 %] 91 %  Body mass index is 51 9 kg/m²  Input and Output Summary (last 24 hours): Intake/Output Summary (Last 24 hours) at 2022 1049  Last data filed at 2022 0720  Gross per 24 hour   Intake 200 ml   Output 1650 ml   Net -1450 ml       Physical Exam:     Physical Exam  Constitutional:       General: He is not in acute distress  Appearance: He is obese  He is not toxic-appearing  HENT:      Mouth/Throat:      Mouth: Mucous membranes are moist       Pharynx: Oropharynx is clear  Cardiovascular:      Rate and Rhythm: Tachycardia present  Rhythm irregular  Pulses: Normal pulses  Pulmonary:      Effort: Pulmonary effort is normal  No respiratory distress  Breath sounds: Normal breath sounds  Abdominal:      General: Bowel sounds are normal  There is distension  Tenderness: There is no abdominal tenderness  Musculoskeletal:      Right lower leg: Edema present  Left lower leg: Edema present  Neurological:      General: No focal deficit present  Mental Status: He is alert and oriented to person, place, and time         Additional Data:     Labs:    Results from last 7 days   Lab Units 12/14/22  0454   WBC Thousand/uL 9 20   HEMOGLOBIN g/dL 14 7   HEMATOCRIT % 49 0   PLATELETS Thousands/uL 203   NEUTROS PCT % 61   LYMPHS PCT % 23   MONOS PCT % 13*   EOS PCT % 2     Results from last 7 days   Lab Units 12/14/22  0454 12/10/22  0311 12/08/22  0526   SODIUM mmol/L 140   < > 143   POTASSIUM mmol/L 3 9   < > 4 6   CHLORIDE mmol/L 100   < > 103   CO2 mmol/L 33*   < > 36*   BUN mg/dL 35*   < > 34*   CREATININE mg/dL 1 19   < > 0 86   ANION GAP mmol/L 7   < > 4   CALCIUM mg/dL 8 8   < > 8 8   ALBUMIN g/dL  --   --  2 9*   TOTAL BILIRUBIN mg/dL  --   --  1 06*   ALK PHOS U/L  --   --  39*   ALT U/L  --   --  30   AST U/L  --   --  10   GLUCOSE RANDOM mg/dL 87   < > 154*    < > = values in this interval not displayed  * I Have Reviewed All Lab Data Listed Above  * Additional Pertinent Lab Tests Reviewed: All Labs Within Last 24 Hours Reviewed    Recent Cultures (last 7 days):           Last 24 Hours Medication List:   Current Facility-Administered Medications   Medication Dose Route Frequency Provider Last Rate   • acetaminophen  650 mg Oral Q6H PRN Avanell Fleming, CRNP     • albuterol  2 puff Inhalation Q4H PRN Avanell Fleming, CRNP     • apixaban  5 mg Oral BID Avanell Fleming, CRNP     • atorvastatin  10 mg Oral Daily With Dinner Avanell Fleming, CRNP     • dextromethorphan-guaiFENesin  10 mL Oral Q4H PRN Avanell Fleming, CRNP     • furosemide  80 mg Intravenous BID (diuretic) Avanell Fleming, CRNP     • metoprolol succinate  50 mg Oral Daily Avanell Fleming, CRNP     • sildenafil  20 mg Oral TID Avanell Fleming, CRNP          Today, Patient Was Seen By: KENYA Darby      ** Please Note: Dictation voice to text software may have been used in the creation of this document   **

## 2022-12-14 NOTE — PROGRESS NOTES
Cardiology Progress Note - Airam Thornton 46 y o  male MRN: [id number]    Unit/Bed#:  Encounter: [id number]      Assessment/Plan:  1  Acute on chronic RV failure/ HFpEF, still overloaded but improved  Continue with Lasix 80 IV twice daily  Net -4 L in 24 hours  Early weights  Salt restriction  Strict I's and O's  Continue with Eliquis, Lipitor, Toprol, Revatio    2  Pneumonia secondary to COVID, still requiring 8 L of oxygen  Management per son  Continue with steroids, nebulizers, oxygen  3   Paroxysmal atrial fibrillation, heart rate stable in 70s to 90s, continue metoprolol, Eliquis  4   Pulmonary hypertension on sildenafil, continue medications  Patient follows with outpatient pulmonary    5  Hypertension, stable 124/82  Continue with Lasix, metoprolol, revatio    6  Hyperlipidemia on Lipitor    7  Obesity, BMI 51, weight loss advised      Subjective:   Patient seen and examined  No significant events overnight  Im feeling better  Denies chest pain  Objective:     Vitals: Blood pressure 124/82, pulse 101, temperature 97 8 °F (36 6 °C), temperature source Oral, resp   rate 16, height 5' 11" (1 803 m), weight (!) 169 kg (372 lb 2 2 oz), SpO2 91 % , Body mass index is 51 9 kg/m² ,   Orthostatic Blood Pressures    Flowsheet Row Most Recent Value   Blood Pressure 124/82 filed at 12/14/2022 0935   Patient Position - Orthostatic VS Sitting filed at 12/13/2022 2000            Intake/Output Summary (Last 24 hours) at 12/14/2022 1116  Last data filed at 12/14/2022 0720  Gross per 24 hour   Intake 200 ml   Output 1650 ml   Net -1450 ml         Physical Exam:    GEN: Airam Thornton appears well, alert and oriented x 3, pleasant and cooperative   HEENT: pupils equal, round, and reactive to light; extraocular muscles intact  NECK: supple, no carotid bruits   HEART: Irregularly irregular, normal S1 and S2, no murmurs, clicks, gallops or rubs   LUNGS: decreased breath sounds bilaterally; no wheezes, rales, or rhonchi   ABDOMEN: normal bowel sounds, soft, no tenderness, no distention  EXTREMITIES:  +1 edema, peripheral pulses normal; no clubbing, cyanosis      Medications:      Current Facility-Administered Medications:   •  acetaminophen (TYLENOL) tablet 650 mg, 650 mg, Oral, Q6H PRN, Pasha Double, CRNP  •  albuterol (PROVENTIL HFA,VENTOLIN HFA) inhaler 2 puff, 2 puff, Inhalation, Q4H PRN, Pasha Double, CRNP  •  apixaban (ELIQUIS) tablet 5 mg, 5 mg, Oral, BID, Pasha Double, CRNP, 5 mg at 12/14/22 1708  •  atorvastatin (LIPITOR) tablet 10 mg, 10 mg, Oral, Daily With Dinner, Pasha Double, CRNP, 10 mg at 12/13/22 1643  •  dextromethorphan-guaiFENesin (ROBITUSSIN DM) oral syrup 10 mL, 10 mL, Oral, Q4H PRN, Pasha Double, CRNP, 10 mL at 12/02/22 1724  •  furosemide (LASIX) injection 80 mg, 80 mg, Intravenous, BID (diuretic), Pasha Double, CRNP, 80 mg at 12/14/22 0900  •  metoprolol succinate (TOPROL-XL) 24 hr tablet 50 mg, 50 mg, Oral, Daily, Pasha Double, CRNP, 50 mg at 12/14/22 0935  •  sildenafil (REVATIO) tablet 20 mg, 20 mg, Oral, TID, Pasha Double, CRNP, 20 mg at 12/14/22 0935     Labs & Results:    Results from last 7 days   Lab Units 12/09/22  0531   NT-PRO BNP pg/mL 527*     Results from last 7 days   Lab Units 12/14/22  0454 12/12/22  0452 12/11/22  0430   WBC Thousand/uL 9 20 7 97 8 05   HEMOGLOBIN g/dL 14 7 14 0 13 8   HEMATOCRIT % 49 0 46 0 45 6   PLATELETS Thousands/uL 203 165 147*         Results from last 7 days   Lab Units 12/14/22  0454 12/12/22  0452 12/11/22  0430 12/10/22  0311 12/08/22  0526   POTASSIUM mmol/L 3 9 4 4 4 6   < > 4 6   CHLORIDE mmol/L 100 104 101   < > 103   CO2 mmol/L 33* 29 32   < > 36*   BUN mg/dL 35* 26* 29*   < > 34*   CREATININE mg/dL 1 19 0 83 0 82   < > 0 86   CALCIUM mg/dL 8 8 9 0 8 7   < > 8 8   ALK PHOS U/L  --   --   --   --  39*   ALT U/L  --   --   --   --  30   AST U/L  --   --   --   --  10    < > = values in this interval not displayed  Results from last 7 days   Lab Units 12/12/22  1138 12/12/22  0452 12/11/22  1928   PTT seconds 98* 96* 83*     Results from last 7 days   Lab Units 12/12/22  0452 12/10/22  0311 12/08/22  0526   MAGNESIUM mg/dL 2 0 2 0 2 2

## 2022-12-14 NOTE — PLAN OF CARE
Problem: OCCUPATIONAL THERAPY ADULT  Goal: Performs self-care activities at highest level of function for planned discharge setting  See evaluation for individualized goals  Description: Treatment Interventions: ADL retraining, Functional transfer training, Endurance training, UE strengthening/ROM, Patient/family training          See flowsheet documentation for full assessment, interventions and recommendations  Outcome: Progressing  Note: Limitation: Decreased ADL status, Decreased endurance, Decreased high-level ADLs     Assessment: Pt seen this date for skilled OT session focused on ADLs, functional transfers and mobility, safety education  The patient was received seated in bed side chair, NAD, PIV access, tele, 8L O2 NC salter  He participated in functional ambulation and self care ADL this date  Pt was modified independent to supervision for line management during functional ambulation without AD, and supervision with set up for self care tasks  Session was somewhat limited today due to elevated HR up to 140s with ambulation, pt asymptomatic  SpO2 maintained >90% throughout activity  At end of session the patient was located seated in bed side chair with call bell in reach and all needs met  Overall the patient remains slightly below his functional baseline, and is primarily limited at this time due to increased O2 requirements, and elevated HR with ambulation  OT will continue to follow while acute to address POC   At this time, recommend no further OT needs upon d/c      OT Discharge Recommendation: No rehabilitation needs        Elsi Leyva OTR/L

## 2022-12-14 NOTE — PLAN OF CARE
Problem: Nutrition/Hydration-ADULT  Goal: Nutrient/Hydration intake appropriate for improving, restoring or maintaining nutritional needs  Description: Monitor and assess patient's nutrition/hydration status for malnutrition  Collaborate with interdisciplinary team and initiate plan and interventions as ordered  Monitor patient's weight and dietary intake as ordered or per policy  Utilize nutrition screening tool and intervene as necessary  Determine patient's food preferences and provide high-protein, high-caloric foods as appropriate       INTERVENTIONS:  - Monitor oral intake, urinary output, labs, and treatment plans  - Assess nutrition and hydration status and recommend course of action  - Evaluate amount of meals eaten  - Assist patient with eating if necessary   - Allow adequate time for meals  - Recommend/ encourage appropriate diets, oral nutritional supplements, and vitamin/mineral supplements  - Order, calculate, and assess calorie counts as needed  - Recommend, monitor, and adjust tube feedings and TPN/PPN based on assessed needs  - Assess need for intravenous fluids  - Provide specific nutrition/hydration education as appropriate  - Include patient/family/caregiver in decisions related to nutrition  Outcome: Progressing     Problem: MOBILITY - ADULT  Goal: Maintain or return to baseline ADL function  Description: INTERVENTIONS:  -  Assess patient's ability to carry out ADLs; assess patient's baseline for ADL function and identify physical deficits which impact ability to perform ADLs (bathing, care of mouth/teeth, toileting, grooming, dressing, etc )  - Assess/evaluate cause of self-care deficits   - Assess range of motion  - Assess patient's mobility; develop plan if impaired  - Assess patient's need for assistive devices and provide as appropriate  - Encourage maximum independence but intervene and supervise when necessary  - Involve family in performance of ADLs  - Assess for home care needs following discharge   - Consider OT consult to assist with ADL evaluation and planning for discharge  - Provide patient education as appropriate  Outcome: Progressing     Problem: RESPIRATORY - ADULT  Goal: Achieves optimal ventilation and oxygenation  Description: INTERVENTIONS:  - Assess for changes in respiratory status  - Assess for changes in mentation and behavior  - Position to facilitate oxygenation and minimize respiratory effort  - Oxygen administered by appropriate delivery if ordered  - Initiate smoking cessation education as indicated  - Encourage broncho-pulmonary hygiene including cough, deep breathe, Incentive Spirometry  - Assess the need for suctioning and aspirate as needed  - Assess and instruct to report SOB or any respiratory difficulty  - Respiratory Therapy support as indicated  Outcome: Progressing

## 2022-12-15 ENCOUNTER — APPOINTMENT (INPATIENT)
Dept: NON INVASIVE DIAGNOSTICS | Facility: HOSPITAL | Age: 52
End: 2022-12-15

## 2022-12-15 LAB
ANION GAP SERPL CALCULATED.3IONS-SCNC: 10 MMOL/L (ref 4–13)
BASOPHILS # BLD AUTO: 0 THOUSANDS/ÂΜL (ref 0–0.1)
BASOPHILS NFR BLD AUTO: 0 % (ref 0–1)
BUN SERPL-MCNC: 35 MG/DL (ref 5–25)
CALCIUM SERPL-MCNC: 9.1 MG/DL (ref 8.3–10.1)
CHLORIDE SERPL-SCNC: 101 MMOL/L (ref 96–108)
CO2 SERPL-SCNC: 31 MMOL/L (ref 21–32)
CREAT SERPL-MCNC: 1.09 MG/DL (ref 0.6–1.3)
EOSINOPHIL # BLD AUTO: 0.3 THOUSAND/ÂΜL (ref 0–0.61)
EOSINOPHIL NFR BLD AUTO: 3 % (ref 0–6)
ERYTHROCYTE [DISTWIDTH] IN BLOOD BY AUTOMATED COUNT: 15.2 % (ref 11.6–15.1)
GFR SERPL CREATININE-BSD FRML MDRD: 77 ML/MIN/1.73SQ M
GLUCOSE SERPL-MCNC: 88 MG/DL (ref 65–140)
HCT VFR BLD AUTO: 50.2 % (ref 36.5–49.3)
HGB BLD-MCNC: 15.4 G/DL (ref 12–17)
IMM GRANULOCYTES # BLD AUTO: 0.03 THOUSAND/UL (ref 0–0.2)
IMM GRANULOCYTES NFR BLD AUTO: 0 % (ref 0–2)
LYMPHOCYTES # BLD AUTO: 2.07 THOUSANDS/ÂΜL (ref 0.6–4.47)
LYMPHOCYTES NFR BLD AUTO: 22 % (ref 14–44)
MCH RBC QN AUTO: 24.4 PG (ref 26.8–34.3)
MCHC RBC AUTO-ENTMCNC: 30.7 G/DL (ref 31.4–37.4)
MCV RBC AUTO: 79 FL (ref 82–98)
MONOCYTES # BLD AUTO: 1.02 THOUSAND/ÂΜL (ref 0.17–1.22)
MONOCYTES NFR BLD AUTO: 11 % (ref 4–12)
NEUTROPHILS # BLD AUTO: 5.92 THOUSANDS/ÂΜL (ref 1.85–7.62)
NEUTS SEG NFR BLD AUTO: 64 % (ref 43–75)
NRBC BLD AUTO-RTO: 0 /100 WBCS
PLATELET # BLD AUTO: 186 THOUSANDS/UL (ref 149–390)
PMV BLD AUTO: 11.1 FL (ref 8.9–12.7)
POTASSIUM SERPL-SCNC: 4 MMOL/L (ref 3.5–5.3)
RBC # BLD AUTO: 6.32 MILLION/UL (ref 3.88–5.62)
SODIUM SERPL-SCNC: 142 MMOL/L (ref 135–147)
WBC # BLD AUTO: 9.34 THOUSAND/UL (ref 4.31–10.16)

## 2022-12-15 RX ORDER — BUMETANIDE 1 MG/1
2 TABLET ORAL 2 TIMES DAILY
Status: DISCONTINUED | OUTPATIENT
Start: 2022-12-15 | End: 2022-12-16 | Stop reason: HOSPADM

## 2022-12-15 RX ADMIN — ATORVASTATIN CALCIUM 10 MG: 10 TABLET, FILM COATED ORAL at 17:10

## 2022-12-15 RX ADMIN — BUMETANIDE 2 MG: 1 TABLET ORAL at 20:59

## 2022-12-15 RX ADMIN — SILDENAFIL 20 MG: 20 TABLET ORAL at 20:59

## 2022-12-15 RX ADMIN — METOPROLOL SUCCINATE 50 MG: 50 TABLET, EXTENDED RELEASE ORAL at 08:35

## 2022-12-15 RX ADMIN — APIXABAN 5 MG: 5 TABLET, FILM COATED ORAL at 08:35

## 2022-12-15 RX ADMIN — FUROSEMIDE 80 MG: 10 INJECTION, SOLUTION INTRAMUSCULAR; INTRAVENOUS at 08:35

## 2022-12-15 RX ADMIN — APIXABAN 5 MG: 5 TABLET, FILM COATED ORAL at 19:00

## 2022-12-15 RX ADMIN — SILDENAFIL 20 MG: 20 TABLET ORAL at 17:10

## 2022-12-15 RX ADMIN — SILDENAFIL 20 MG: 20 TABLET ORAL at 08:48

## 2022-12-15 NOTE — NURSING NOTE
Patient ambulated in the hallway about 100 feet while on the nasal cannula, patient's 02 saturation stated at 95%

## 2022-12-15 NOTE — ASSESSMENT & PLAN NOTE
+ Noted O2 saturation 70% on room air, was on high flow, currently on 5L O2 by nasal cannula  · COVID-19 PCR + 12/1/2022  · CT PE protocol consistent with cardiomegaly/right lower lobe pneumonia/pulmonary hypertension  No PE   · CRP 20 2 -><3  · ddimer 3 5 - >0 69  · S/p IV Decadron  · S/p remdesivir  · On baricitinib [day 14/14]  · Initially on heparin drip - > now transitioned to eliquis  · Procalcitonin x2 negative

## 2022-12-15 NOTE — NURSING NOTE
Patient weighed standing scale today weight comparison showed large weight gain of 16 7 pounds  Patient states that he was weighed in the bed yesterday which could be the reason for the weight difference  Will have oncoming shift weight again with standing scale to verify weight  No increased in edema noted  PA made aware

## 2022-12-15 NOTE — PLAN OF CARE
Problem: Potential for Falls  Goal: Patient will remain free of falls  Description: INTERVENTIONS:  - Educate patient/family on patient safety including physical limitations  - Instruct patient to call for assistance with activity   - Consult OT/PT to assist with strengthening/mobility   - Keep Call bell within reach  - Keep bed low and locked with side rails adjusted as appropriate  - Keep care items and personal belongings within reach  - Initiate and maintain comfort rounds  - Make Fall Risk Sign visible to staff  - Offer Toileting every  Hours, in advance of need  - Initiate/Maintain alarm  - Obtain necessary fall risk management equipment:   - Apply yellow socks and bracelet for high fall risk patients  - Consider moving patient to room near nurses station  Outcome: Progressing     Problem: Nutrition/Hydration-ADULT  Goal: Nutrient/Hydration intake appropriate for improving, restoring or maintaining nutritional needs  Description: Monitor and assess patient's nutrition/hydration status for malnutrition  Collaborate with interdisciplinary team and initiate plan and interventions as ordered  Monitor patient's weight and dietary intake as ordered or per policy  Utilize nutrition screening tool and intervene as necessary  Determine patient's food preferences and provide high-protein, high-caloric foods as appropriate       INTERVENTIONS:  - Monitor oral intake, urinary output, labs, and treatment plans  - Assess nutrition and hydration status and recommend course of action  - Evaluate amount of meals eaten  - Assist patient with eating if necessary   - Allow adequate time for meals  - Recommend/ encourage appropriate diets, oral nutritional supplements, and vitamin/mineral supplements  - Order, calculate, and assess calorie counts as needed  - Recommend, monitor, and adjust tube feedings and TPN/PPN based on assessed needs  - Assess need for intravenous fluids  - Provide specific nutrition/hydration education as appropriate  - Include patient/family/caregiver in decisions related to nutrition  Outcome: Progressing     Problem: MOBILITY - ADULT  Goal: Maintain or return to baseline ADL function  Description: INTERVENTIONS:  -  Assess patient's ability to carry out ADLs; assess patient's baseline for ADL function and identify physical deficits which impact ability to perform ADLs (bathing, care of mouth/teeth, toileting, grooming, dressing, etc )  - Assess/evaluate cause of self-care deficits   - Assess range of motion  - Assess patient's mobility; develop plan if impaired  - Assess patient's need for assistive devices and provide as appropriate  - Encourage maximum independence but intervene and supervise when necessary  - Involve family in performance of ADLs  - Assess for home care needs following discharge   - Consider OT consult to assist with ADL evaluation and planning for discharge  - Provide patient education as appropriate  Outcome: Progressing  Goal: Maintains/Returns to pre admission functional level  Description: INTERVENTIONS:  - Perform BMAT or MOVE assessment daily    - Set and communicate daily mobility goal to care team and patient/family/caregiver  - Collaborate with rehabilitation services on mobility goals if consulted  - Perform Range of Motion  times a day  - Reposition patient every  hours    - Dangle patient  times a day  - Stand patient  times a day  - Ambulate patient  times a day  - Out of bed to chair  times a day   - Out of bed for meals times a day  - Out of bed for toileting  - Record patient progress and toleration of activity level   Outcome: Progressing     Problem: Prexisting or High Potential for Compromised Skin Integrity  Goal: Skin integrity is maintained or improved  Description: INTERVENTIONS:  - Identify patients at risk for skin breakdown  - Assess and monitor skin integrity  - Assess and monitor nutrition and hydration status  - Monitor labs   - Assess for incontinence   - Turn and reposition patient  - Assist with mobility/ambulation  - Relieve pressure over bony prominences  - Avoid friction and shearing  - Provide appropriate hygiene as needed including keeping skin clean and dry  - Evaluate need for skin moisturizer/barrier cream  - Collaborate with interdisciplinary team   - Patient/family teaching  - Consider wound care consult   Outcome: Progressing     Problem: CARDIOVASCULAR - ADULT  Goal: Maintains optimal cardiac output and hemodynamic stability  Description: INTERVENTIONS:  - Monitor I/O, vital signs and rhythm  - Monitor for S/S and trends of decreased cardiac output  - Administer and titrate ordered vasoactive medications to optimize hemodynamic stability  - Assess quality of pulses, skin color and temperature  - Assess for signs of decreased coronary artery perfusion  - Instruct patient to report change in severity of symptoms  Outcome: Progressing  Goal: Absence of cardiac dysrhythmias or at baseline rhythm  Description: INTERVENTIONS:  - Continuous cardiac monitoring, vital signs, obtain 12 lead EKG if ordered  - Administer antiarrhythmic and heart rate control medications as ordered  - Monitor electrolytes and administer replacement therapy as ordered  Outcome: Progressing     Problem: RESPIRATORY - ADULT  Goal: Achieves optimal ventilation and oxygenation  Description: INTERVENTIONS:  - Assess for changes in respiratory status  - Assess for changes in mentation and behavior  - Position to facilitate oxygenation and minimize respiratory effort  - Oxygen administered by appropriate delivery if ordered  - Initiate smoking cessation education as indicated  - Encourage broncho-pulmonary hygiene including cough, deep breathe, Incentive Spirometry  - Assess the need for suctioning and aspirate as needed  - Assess and instruct to report SOB or any respiratory difficulty  - Respiratory Therapy support as indicated  Outcome: Progressing     Problem: GENITOURINARY - ADULT  Goal: Urinary catheter remains patent  Description: INTERVENTIONS:  - Assess patency of urinary catheter  - If patient has a chronic diallo, consider changing catheter if non-functioning  - Follow guidelines for intermittent irrigation of non-functioning urinary catheter  Outcome: Progressing     Problem: METABOLIC, FLUID AND ELECTROLYTES - ADULT  Goal: Electrolytes maintained within normal limits  Description: INTERVENTIONS:  - Monitor labs and assess patient for signs and symptoms of electrolyte imbalances  - Administer electrolyte replacement as ordered  - Monitor response to electrolyte replacements, including repeat lab results as appropriate  - Instruct patient on fluid and nutrition as appropriate  Outcome: Progressing  Goal: Glucose maintained within target range  Description: INTERVENTIONS:  - Monitor Blood Glucose as ordered  - Assess for signs and symptoms of hyperglycemia and hypoglycemia  - Administer ordered medications to maintain glucose within target range  - Assess nutritional intake and initiate nutrition service referral as needed  Outcome: Progressing

## 2022-12-15 NOTE — PLAN OF CARE
Problem: PHYSICAL THERAPY ADULT  Goal: Performs mobility at highest level of function for planned discharge setting  See evaluation for individualized goals  Description: Treatment/Interventions: Elevations, Therapeutic exercise, Endurance training, Patient/family training, Bed mobility, Gait training, Spoke to nursing, Spoke to case management  Equipment Recommended: Other (Comment) (TBD based on pt progress)       See flowsheet documentation for full assessment, interventions and recommendations  Outcome: Progressing  Note: Prognosis: Good  Problem List: Impaired balance, Decreased mobility  Assessment: Pt seen for PT treatment session this date, consisting of gt training on level surfaces to improve pt safety in household environment and elevation training for enter/exit home  Since previous session, pt has made very good progress in terms of activity tolerance and assist required for mobility  This date, patient greeted seated in bedside chair and agreed to PT session reporting 0/10 pain and maintained asymptomatic throughout session  Patient on 5 Lo2 via nc and SPo2 maintained 92-95% throughout session at rest and with activity  Patient performed STS Peace with arm rests and progressed to ambulation 100', 110' Peace without AD with seated rest between gait trials to monitor O2 and fatigue; patient tolerated well  Patient progressed to stair training 6 steps with unilateral hand rail supervision and SPo2: 93% post  Pertinent barriers during this session include fatigue  Current goals and POC remain appropriate, pt continues to have rehab potential and is making progress towards STGs  Pt prognosis for achieving goals is good, pending pt progress with hospitalization/medical status improvements, and indicated by orientation, ability to follow directions, motivation, awareness, previous response to intervention and responsive to cues/strategies  Pt limited d/t supplemental O2 requirments   PT recommends no rehabilitation needs upon discharge  Pt continues to be functioning below baseline level, and remains limited 2* factors listed above  PT will continue to see pt during current hospitalization in order to address the deficits listed above and provide interventions consistent w/ POC in effort to achieve STGs  Barriers to Discharge: Inaccessible home environment     PT Discharge Recommendation: No rehabilitation needs    See flowsheet documentation for full assessment     Venkatesh Rodriguez; PT, DPT

## 2022-12-15 NOTE — ASSESSMENT & PLAN NOTE
· Creatinine on admission 1 39, baseline around 1 in setting of COVID-19 infection verses possibility of onset of cardiorenal syndrome with CHF history  · On diuresis  · Creatinine normalised  · Monitor for now

## 2022-12-15 NOTE — PROGRESS NOTES
[facility]  Progress Note Dwaine Barber 1970, 46 y o  male MRN: [id number]  Unit/Bed#: -02 Encounter: [id number]  Primary Care Provider: No primary care provider on file  Date and time admitted to hospital: 12/1/2022  5:52 PM    * Acute respiratory failure due to COVID-19 [facility])  Assessment & Plan  + Noted O2 saturation 70% on room air, was on high flow, currently on 5L O2 by nasal cannula  · COVID-19 PCR + 12/1/2022  · CT PE protocol consistent with cardiomegaly/right lower lobe pneumonia/pulmonary hypertension  No PE   · CRP 20 2 -><3  · ddimer 3 5 - >0 69  · S/p IV Decadron  · S/p remdesivir  · On baricitinib [day 14/14]  · Initially on heparin drip - > now transitioned to eliquis  · Procalcitonin x2 negative  CHF (congestive heart failure) ([facility])  Assessment & Plan  · CT PE protocol noting evidence of pulmonary hypertension  · Echocardiogram notes right ventricular cavity size severely dilated  · Concern for acute on chronic right heart failure exacerbation  · s/p IV Lasix 80 twice daily -> transition to po bumex 2 BID  · -16 6L so far, weight down by 50lbs  · Appreciate cardiology input  Elevated serum creatinine  Assessment & Plan  · Creatinine on admission 1 39, baseline around 1 in setting of COVID-19 infection verses possibility of onset of cardiorenal syndrome with CHF history  · On diuresis  · Creatinine normalised  · Monitor for now    Obstructive sleep apnea  Assessment & Plan  · BIPAP QHS  · Needs OP sleep study    Pulmonary hypertension ([facility] [address] )  Assessment & Plan  · Pulmonary hypertension noted on CTA chest, patient with history  · Continue home sildenafil t i d     Mixed hyperlipidemia  Assessment & Plan  · On statin      VTE Pharmacologic Prophylaxis:   Pharmacologic: Apixaban (Eliquis)  Mechanical VTE Prophylaxis in Place: Yes    Patient Centered Rounds: I have performed bedside rounds with nursing staff today      Discussions with Specialists or Other Care Team Provider: cardiology, CM    Education and Discussions with Family / Patient: dw patient    Time Spent for Care: 30 minutes  More than 50% of total time spent on counseling and coordination of care as described above  Current Length of Stay: 14 day(s)    Current Patient Status: Inpatient   Certification Statement: The patient will continue to require additional inpatient hospital stay due to needs diuresis    Discharge Plan: likely 24 hrs    Code Status: Level 1 - Full Code      Subjective:   Doing well  No acute concerns  On 5L O2 currently    Objective:     Vitals:   Temp (24hrs), Av 9 °F (36 6 °C), Min:97 6 °F (36 4 °C), Max:98 2 °F (36 8 °C)    Temp:  [97 6 °F (36 4 °C)-98 2 °F (36 8 °C)] 98 2 °F (36 8 °C)  HR:  [] 57  Resp:  [15-16] 16  BP: (103-117)/(55-76) 115/75  SpO2:  [87 %-93 %] 91 %  Body mass index is 47 38 kg/m²  Input and Output Summary (last 24 hours): Intake/Output Summary (Last 24 hours) at 12/15/2022 1102  Last data filed at 2022 1531  Gross per 24 hour   Intake --   Output 1400 ml   Net -1400 ml       Physical Exam:     Physical Exam  Constitutional:       General: He is not in acute distress  Appearance: He is obese  He is not toxic-appearing  HENT:      Mouth/Throat:      Mouth: Mucous membranes are moist       Pharynx: Oropharynx is clear  Cardiovascular:      Rate and Rhythm: Normal rate and regular rhythm  Pulses: Normal pulses  Pulmonary:      Effort: Pulmonary effort is normal       Breath sounds: Normal breath sounds  No wheezing or rales  Abdominal:      General: Bowel sounds are normal  There is distension  Tenderness: There is no abdominal tenderness  There is no guarding  Musculoskeletal:      Right lower leg: Edema present  Left lower leg: Edema present  Neurological:      General: No focal deficit present  Mental Status: He is alert and oriented to person, place, and time           Additional Data: Labs:    Results from last 7 days   Lab Units 12/15/22  0620   WBC Thousand/uL 9 34   HEMOGLOBIN g/dL 15 4   HEMATOCRIT % 50 2*   PLATELETS Thousands/uL 186   NEUTROS PCT % 64   LYMPHS PCT % 22   MONOS PCT % 11   EOS PCT % 3     Results from last 7 days   Lab Units 12/15/22  0621   SODIUM mmol/L 142   POTASSIUM mmol/L 4 0   CHLORIDE mmol/L 101   CO2 mmol/L 31   BUN mg/dL 35*   CREATININE mg/dL 1 09   ANION GAP mmol/L 10   CALCIUM mg/dL 9 1   GLUCOSE RANDOM mg/dL 88                       * I Have Reviewed All Lab Data Listed Above  * Additional Pertinent Lab Tests Reviewed: All Labs Within Last 24 Hours Reviewed    Recent Cultures (last 7 days):           Last 24 Hours Medication List:   Current Facility-Administered Medications   Medication Dose Route Frequency Provider Last Rate   • acetaminophen  650 mg Oral Q6H PRN ELIZABETH Gamble     • albuterol  2 puff Inhalation Q4H PRN ELIZABETH Gamble     • apixaban  5 mg Oral BID ELIZABETH Gamble     • atorvastatin  10 mg Oral Daily With 216 ELIZABETH Arellano     • bumetanide  2 mg Oral BID Bettie Lamar MD     • dextromethorphan-guaiFENesin  10 mL Oral Q4H PRN ELIZABETH Gamble     • metoprolol succinate  50 mg Oral Daily ELIZABETH Gamble     • sildenafil  20 mg Oral TID ELIZABETH Gamble          Today, Patient Was Seen By: KENYA Day      ** Please Note: Dictation voice to text software may have been used in the creation of this document   **

## 2022-12-15 NOTE — RESPIRATORY THERAPY NOTE
Home Oxygen Qualifying Test     Patient name: Beni Ayala        : 1970   Date of Test:  December 15, 2022  Diagnosis:    Home Oxygen Test:    **Medicare Guidelines require item(s) 1-5 on all ambulatory patients or 1 and 2 on non-ambulatory patients  1  Baseline SPO2 on Room Air at rest 92 %   a  If <= 88% on Room Air add O2 via NC to obtain SpO2 >=88%  If LPM needed, document NA LPM    b   needed to reach =>88%    2  SPO2 during exertion on Room Air 91  %  a  During exertion monitor SPO2  If SPO2 increases >=89%, do not add supplemental oxygen    3  SPO2 on Oxygen at Rest NA % at NA LPM    4  SPO2 during exertion on Oxygen NA % at NA LPM    5  Test performed during exertion activity  []  Supplemental Home Oxygen is indicated  [x]  Client does not qualify for home oxygen      Respiratory Additional Notes- Patient was ambulated on ROOM AIR, spo2 maintained low 90's, patient states no SOB    Maria L Esteves, RT

## 2022-12-15 NOTE — RESPIRATORY THERAPY NOTE
12/14/22 5959   Respiratory Assessment   Resp Comments placed pt on NIV for HS use   O2 Device v60   Non-Invasive Information   O2 Interface Device Face mask   Non-Invasive Ventilation Mode BiPAP   $ Intermittent NIV Yes   SpO2 93 %   $ Pulse Oximetry Spot Check Charge Completed   Non-Invasive Settings   IPAP (cm) 25 cm   EPAP (cm) 15 cm   Rate (Set) 10   FiO2 (%) 60   Rise Time 2  (with 10 min ramp applied)   Inspiratory Time (Set) 0 8   Humidification   (heater)   Temperature (Set) 31   Non-Invasive Readings   Skin Intervention Skin intact   Total Rate 18   Vt (mL) (Mech) 827   MV (Mech) 14 7   Peak Pressure (Obs) 18   Heater Temperature (Obs)   (unit warming)   Leak (lpm) 31   Non-Invasive Alarms   Insp Pressure High (cm H20) 25   Insp Pressure Low (cm H20) 8   Low Insp Pressure Time (sec) 20 sec   MV Low (L/min) 3   Vt High (mL) 1500   Vt Low (mL) 350   High Resp Rate (BPM) 35 BPM   Low Resp Rate (BPM) 8 BPM   Apnea Interval (sec) 20   Maintenance   Water bag changed No     Pt's RN notified of NIV application

## 2022-12-15 NOTE — PHYSICAL THERAPY NOTE
PHYSICAL THERAPY TREATMENT  NAME:  Melissa Singh  DATE: 12/15/22    AGE:   46 y o  Mrn:   [id number]  ADMIT DX:  SOB (shortness of breath) [R06 02]  Hypoxia [R09 02]  COVID [U07 1]  Problem List:   Patient Active Problem List   Diagnosis    CHF (congestive heart failure) (HCC)    COPD mixed type (HCC)    Hypertension, essential    Impaired fasting glucose    Left ventricular hypertrophy    Mixed hyperlipidemia    Morbid obesity ([facility] )    Obstructive sleep apnea    Noncompliance with CPAP treatment    Pulmonary hypertension ([facility] )    Acute respiratory failure due to COVID-19 ([facility] )    Elevated serum creatinine       Past Medical History  Past Medical History:   Diagnosis Date    CHF (congestive heart failure) ([facility] )     Hypertension     Sleep apnea        Past Surgical History  History reviewed  No pertinent surgical history  Length Of Stay: 14  Performed at least 2 patient identifiers during session: Name and Birthday     12/15/22 1050   PT Last Visit   PT Visit Date 12/15/22   Note Type   Note Type Treatment  ([facility] confirmed pt appropriate for PT session)   Pain Assessment   Pain Assessment Tool 0-10   Pain Score No Pain   Restrictions/Precautions   Weight Bearing Precautions Per Order No   Other Precautions O2;Multiple lines   General   Chart Reviewed Yes   Response to Previous Treatment Patient with no complaints from previous session  Family/Caregiver Present No   Cognition   Overall Cognitive Status WFL   Arousal/Participation Alert; Cooperative   Attention Within functional limits   Orientation Level Oriented X4   Memory Within functional limits   Following Commands Follows all commands and directions without difficulty   Comments Pt agreed to PT session   Subjective   Subjective "It feels good to move"   Bed Mobility   Additional Comments Bed mobility not tested as patient seated in bedside chair at start/end of session   Transfers   Sit to Stand 6  Modified independent   Additional items Armrests Stand to Sit 6  Modified independent   Additional items Armrests   Stand pivot 7  Independent   Additional Comments no AD used during transfers   Ambulation/Elevation   Gait pattern Decreased heel strike; Improper Weight shift   Gait Assistance 6  Modified independent   Additional items Assist x 1   Assistive Device None   Distance 100', 110'   Stair Management Assistance 5  Supervision   Additional items Assist x 1;Verbal cues   Stair Management Technique One rail R   Number of Stairs 6   Balance   Static Sitting Normal   Dynamic Sitting Good   Static Standing Good   Dynamic Standing Fair +   Ambulatory Fair +   Activity Tolerance   Activity Tolerance Patient tolerated treatment well   Medical Staff Made Aware  made aware of updated d/c rec   Nurse Made Aware RN aware of session outcomes   Exercises   Hip Flexion   (marcing in place x1 minute without UE support)   Assessment   Prognosis Good   Problem List Impaired balance;Decreased mobility   Assessment Pt seen for PT treatment session this date, consisting of gt training on level surfaces to improve pt safety in household environment and elevation training for enter/exit home  Since previous session, pt has made very good progress in terms of activity tolerance and assist required for mobility  This date, patient greeted seated in bedside chair and agreed to PT session reporting 0/10 pain and maintained asymptomatic throughout session  Patient on 5 Lo2 via nc and SPo2 maintained 92-95% throughout session at rest and with activity  Patient performed STS Peace with arm rests and progressed to ambulation 100', 110' Peace without AD with seated rest between gait trials to monitor O2 and fatigue; patient tolerated well  Patient progressed to stair training 6 steps with unilateral hand rail supervision and SPo2: 93% post  Pertinent barriers during this session include fatigue   Current goals and POC remain appropriate, pt continues to have rehab potential and is making progress towards STGs  Pt prognosis for achieving goals is good, pending pt progress with hospitalization/medical status improvements, and indicated by orientation, ability to follow directions, motivation, awareness, previous response to intervention and responsive to cues/strategies  Pt limited d/t supplemental O2 requirments  PT recommends no rehabilitation needs upon discharge  Pt continues to be functioning below baseline level, and remains limited 2* factors listed above  PT will continue to see pt during current hospitalization in order to address the deficits listed above and provide interventions consistent w/ POC in effort to achieve STGs  Goals   Patient Goals to go home   STG Expiration Date 12/18/22   Short Term Goal #1 Patients goals remain appropriate  Continue with plan of care  Addition of stair training goal: patient will ascend/descend 6 steps Peace with unilateral hand rail and no LOB in any direction with SPo2 and vital signs stable  PT Treatment Day 1   Plan   Treatment/Interventions Elevations; Therapeutic exercise; Endurance training;Patient/family training;Bed mobility;Gait training;Spoke to nursing;Spoke to case management   Progress Progressing toward goals   PT Frequency 2-3x/wk   Recommendation   PT Discharge Recommendation No rehabilitation needs   AM-PAC Basic Mobility Inpatient   Turning in Bed Without Bedrails 4   Lying on Back to Sitting on Edge of Flat Bed 4   Moving Bed to Chair 4   Standing Up From Chair 4   Walk in Room 4   Climb 3-5 Stairs 3   Basic Mobility Inpatient Raw Score 23   Basic Mobility Standardized Score 50 88   Highest Level Of Mobility   JH-HLM Goal 7: Walk 25 feet or more   JH-HLM Achieved 7: Walk 25 feet or more   End of Consult   Patient Position at End of Consult All needs within reach; Bedside chair         Time In: 1050  Time Out: 1125  Total Treatment Minutes: [id number] Jolanta Castillo, [place]

## 2022-12-15 NOTE — ASSESSMENT & PLAN NOTE
· CT PE protocol noting evidence of pulmonary hypertension  · Echocardiogram notes right ventricular cavity size severely dilated  · Concern for acute on chronic right heart failure exacerbation  · s/p IV Lasix 80 twice daily -> transition to po bumex 2 BID  · -16 6L so far, weight down by 50lbs  · Appreciate cardiology input

## 2022-12-15 NOTE — PLAN OF CARE
Problem: CARDIOVASCULAR - ADULT  Goal: Maintains optimal cardiac output and hemodynamic stability  Description: INTERVENTIONS:  - Monitor I/O, vital signs and rhythm  - Monitor for S/S and trends of decreased cardiac output  - Administer and titrate ordered vasoactive medications to optimize hemodynamic stability  - Assess quality of pulses, skin color and temperature  - Assess for signs of decreased coronary artery perfusion  - Instruct patient to report change in severity of symptoms  Outcome: Progressing  Goal: Absence of cardiac dysrhythmias or at baseline rhythm  Description: INTERVENTIONS:  - Continuous cardiac monitoring, vital signs, obtain 12 lead EKG if ordered  - Administer antiarrhythmic and heart rate control medications as ordered  - Monitor electrolytes and administer replacement therapy as ordered  Outcome: Progressing     Problem: RESPIRATORY - ADULT  Goal: Achieves optimal ventilation and oxygenation  Description: INTERVENTIONS:  - Assess for changes in respiratory status  - Assess for changes in mentation and behavior  - Position to facilitate oxygenation and minimize respiratory effort  - Oxygen administered by appropriate delivery if ordered  - Initiate smoking cessation education as indicated  - Encourage broncho-pulmonary hygiene including cough, deep breathe, Incentive Spirometry  - Assess the need for suctioning and aspirate as needed  - Assess and instruct to report SOB or any respiratory difficulty  - Respiratory Therapy support as indicated  Outcome: Progressing     Problem: METABOLIC, FLUID AND ELECTROLYTES - ADULT  Goal: Electrolytes maintained within normal limits  Description: INTERVENTIONS:  - Monitor labs and assess patient for signs and symptoms of electrolyte imbalances  - Administer electrolyte replacement as ordered  - Monitor response to electrolyte replacements, including repeat lab results as appropriate  - Instruct patient on fluid and nutrition as appropriate  Outcome: Progressing  Goal: Glucose maintained within target range  Description: INTERVENTIONS:  - Monitor Blood Glucose as ordered  - Assess for signs and symptoms of hyperglycemia and hypoglycemia  - Administer ordered medications to maintain glucose within target range  - Assess nutritional intake and initiate nutrition service referral as needed  Outcome: Progressing

## 2022-12-15 NOTE — PROGRESS NOTES
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/HFpEF, mildly overloaded but markedly improved  We will switch over from Lasix 80 IV twice daily to Bumex oral 2 mg twice daily  Net -3L, weight down >+50lbs  daily weights  Salt restriction  Strict I's and O's     2   Pneumonia secondary to COVID, currently on 5 L of oxygen  Management per slim  Continue steroids, nebulizers    3  Paroxysmal atrial fibrillation heart rate stable in the 70s to 90s  Continue metoprolol, Eliquis    4  Pulmonary hypertension on sildenafil, limited echocardiogram today to reassess RV/PA pressure    5  Hypertension, stable 116/74, continue with Bumex, metoprolol, Revatio    6  Hyperlipidemia on Lipitor    7  CAD, BMI 47, weight loss advised      Subjective:   Patient seen and examined  No significant events overnight  Feeling much better  Objective:     Vitals: Blood pressure 116/74, pulse 89, temperature 98 2 °F (36 8 °C), resp  rate 16, height 5' 11" (1 803 m), weight (!) 154 kg (339 lb 11 7 oz), SpO2 92 %  , Body mass index is 47 38 kg/m² ,   Orthostatic Blood Pressures    Flowsheet Row Most Recent Value   Blood Pressure 116/74 filed at 12/15/2022 2704   Patient Position - Orthostatic VS Sitting filed at 12/14/2022 1100            Intake/Output Summary (Last 24 hours) at 12/15/2022 1031  Last data filed at 12/14/2022 1531  Gross per 24 hour   Intake --   Output 1400 ml   Net -1400 ml         Physical Exam:    GEN: Jackeline Fernandez appears well, alert and oriented x 3, pleasant and cooperative   HEENT: pupils equal, round, and reactive to light; extraocular muscles intact  NECK: supple, no carotid bruits   HEART: Irregularly irregular, normal S1 and S2, no murmurs, clicks, gallops or rubs   LUNGS: Decreased breath sounds bilaterally; no wheezes, rales, or rhonchi   ABDOMEN: normal bowel sounds, soft, no tenderness, no distention  EXTREMITIES: +1 edema, peripheral pulses normal; no clubbing, cyanosis      Medications:      Current Facility-Administered Medications:   •  acetaminophen (TYLENOL) tablet 650 mg, 650 mg, Oral, Q6H PRN, Carmen Kayli, CRNP  •  albuterol (PROVENTIL HFA,VENTOLIN HFA) inhaler 2 puff, 2 puff, Inhalation, Q4H PRN, Carmen Kayli, CRNP  •  apixaban (ELIQUIS) tablet 5 mg, 5 mg, Oral, BID, Carmen Kayli, CRNP, 5 mg at 12/15/22 9742  •  atorvastatin (LIPITOR) tablet 10 mg, 10 mg, Oral, Daily With Dinner, Carmen Kayli, CRNP, 10 mg at 12/14/22 1531  •  bumetanide (BUMEX) tablet 2 mg, 2 mg, Oral, BID, Charmayne Begin, MD  •  dextromethorphan-guaiFENesin (ROBITUSSIN DM) oral syrup 10 mL, 10 mL, Oral, Q4H PRN, Carmen Kayli, CRNP, 10 mL at 12/02/22 1724  •  metoprolol succinate (TOPROL-XL) 24 hr tablet 50 mg, 50 mg, Oral, Daily, Carmen Kayli, CRNP, 50 mg at 12/15/22 7636  •  sildenafil (REVATIO) tablet 20 mg, 20 mg, Oral, TID, Carmen Kayli, CRNP, 20 mg at 12/15/22 0848     Labs & Results:    Results from last 7 days   Lab Units 12/09/22  0531   NT-PRO BNP pg/mL 527*     Results from last 7 days   Lab Units 12/15/22  0620 12/14/22  0454 12/12/22  0452   WBC Thousand/uL 9 34 9 20 7 97   HEMOGLOBIN g/dL 15 4 14 7 14 0   HEMATOCRIT % 50 2* 49 0 46 0   PLATELETS Thousands/uL 186 203 165         Results from last 7 days   Lab Units 12/15/22  0621 12/14/22  0454 12/12/22  0452   POTASSIUM mmol/L 4 0 3 9 4 4   CHLORIDE mmol/L 101 100 104   CO2 mmol/L 31 33* 29   BUN mg/dL 35* 35* 26*   CREATININE mg/dL 1 09 1 19 0 83   CALCIUM mg/dL 9 1 8 8 9 0     Results from last 7 days   Lab Units 12/12/22  1138 12/12/22  0452 12/11/22  1928   PTT seconds 98* 96* 83*     Results from last 7 days   Lab Units 12/12/22  0452 12/10/22  0311   MAGNESIUM mg/dL 2 0 2 0

## 2022-12-16 ENCOUNTER — TELEPHONE (OUTPATIENT)
Dept: CARDIOLOGY CLINIC | Facility: CLINIC | Age: 52
End: 2022-12-16

## 2022-12-16 VITALS
HEIGHT: 71 IN | WEIGHT: 315 LBS | BODY MASS INDEX: 44.1 KG/M2 | RESPIRATION RATE: 18 BRPM | TEMPERATURE: 98.6 F | SYSTOLIC BLOOD PRESSURE: 112 MMHG | OXYGEN SATURATION: 89 % | HEART RATE: 92 BPM | DIASTOLIC BLOOD PRESSURE: 80 MMHG

## 2022-12-16 LAB
ANION GAP SERPL CALCULATED.3IONS-SCNC: 9 MMOL/L (ref 4–13)
BASOPHILS # BLD AUTO: 0.01 THOUSANDS/ÂΜL (ref 0–0.1)
BASOPHILS NFR BLD AUTO: 0 % (ref 0–1)
BUN SERPL-MCNC: 30 MG/DL (ref 5–25)
CALCIUM SERPL-MCNC: 8.8 MG/DL (ref 8.3–10.1)
CHLORIDE SERPL-SCNC: 100 MMOL/L (ref 96–108)
CO2 SERPL-SCNC: 32 MMOL/L (ref 21–32)
CREAT SERPL-MCNC: 1.1 MG/DL (ref 0.6–1.3)
EOSINOPHIL # BLD AUTO: 0.33 THOUSAND/ÂΜL (ref 0–0.61)
EOSINOPHIL NFR BLD AUTO: 3 % (ref 0–6)
ERYTHROCYTE [DISTWIDTH] IN BLOOD BY AUTOMATED COUNT: 14.8 % (ref 11.6–15.1)
GFR SERPL CREATININE-BSD FRML MDRD: 76 ML/MIN/1.73SQ M
GLUCOSE SERPL-MCNC: 99 MG/DL (ref 65–140)
HCT VFR BLD AUTO: 49.6 % (ref 36.5–49.3)
HGB BLD-MCNC: 15.2 G/DL (ref 12–17)
IMM GRANULOCYTES # BLD AUTO: 0.02 THOUSAND/UL (ref 0–0.2)
IMM GRANULOCYTES NFR BLD AUTO: 0 % (ref 0–2)
LYMPHOCYTES # BLD AUTO: 1.77 THOUSANDS/ÂΜL (ref 0.6–4.47)
LYMPHOCYTES NFR BLD AUTO: 18 % (ref 14–44)
MCH RBC QN AUTO: 24.3 PG (ref 26.8–34.3)
MCHC RBC AUTO-ENTMCNC: 30.6 G/DL (ref 31.4–37.4)
MCV RBC AUTO: 79 FL (ref 82–98)
MONOCYTES # BLD AUTO: 1.11 THOUSAND/ÂΜL (ref 0.17–1.22)
MONOCYTES NFR BLD AUTO: 11 % (ref 4–12)
NEUTROPHILS # BLD AUTO: 6.55 THOUSANDS/ÂΜL (ref 1.85–7.62)
NEUTS SEG NFR BLD AUTO: 68 % (ref 43–75)
NRBC BLD AUTO-RTO: 0 /100 WBCS
PLATELET # BLD AUTO: 209 THOUSANDS/UL (ref 149–390)
PMV BLD AUTO: 11.1 FL (ref 8.9–12.7)
POTASSIUM SERPL-SCNC: 3.7 MMOL/L (ref 3.5–5.3)
RBC # BLD AUTO: 6.25 MILLION/UL (ref 3.88–5.62)
SODIUM SERPL-SCNC: 141 MMOL/L (ref 135–147)
WBC # BLD AUTO: 9.79 THOUSAND/UL (ref 4.31–10.16)

## 2022-12-16 RX ORDER — METOPROLOL SUCCINATE 25 MG/1
75 TABLET, EXTENDED RELEASE ORAL DAILY
Qty: 90 TABLET | Refills: 0 | Status: SHIPPED | OUTPATIENT
Start: 2022-12-17 | End: 2022-12-16 | Stop reason: SDUPTHER

## 2022-12-16 RX ORDER — BUMETANIDE 2 MG/1
2 TABLET ORAL 2 TIMES DAILY
Qty: 60 TABLET | Refills: 0 | Status: SHIPPED | OUTPATIENT
Start: 2022-12-16 | End: 2022-12-20 | Stop reason: SDUPTHER

## 2022-12-16 RX ORDER — METOPROLOL SUCCINATE 25 MG/1
75 TABLET, EXTENDED RELEASE ORAL DAILY
Qty: 90 TABLET | Refills: 0 | Status: SHIPPED | OUTPATIENT
Start: 2022-12-17 | End: 2023-01-16

## 2022-12-16 RX ORDER — METOPROLOL SUCCINATE 25 MG/1
25 TABLET, EXTENDED RELEASE ORAL ONCE
Status: COMPLETED | OUTPATIENT
Start: 2022-12-16 | End: 2022-12-16

## 2022-12-16 RX ORDER — BUMETANIDE 2 MG/1
2 TABLET ORAL 2 TIMES DAILY
Qty: 60 TABLET | Refills: 0 | Status: SHIPPED | OUTPATIENT
Start: 2022-12-16 | End: 2022-12-16 | Stop reason: SDUPTHER

## 2022-12-16 RX ADMIN — METOPROLOL SUCCINATE 50 MG: 50 TABLET, EXTENDED RELEASE ORAL at 08:24

## 2022-12-16 RX ADMIN — BUMETANIDE 2 MG: 1 TABLET ORAL at 08:25

## 2022-12-16 RX ADMIN — APIXABAN 5 MG: 5 TABLET, FILM COATED ORAL at 08:24

## 2022-12-16 RX ADMIN — METOPROLOL SUCCINATE 25 MG: 25 TABLET, EXTENDED RELEASE ORAL at 09:40

## 2022-12-16 RX ADMIN — SILDENAFIL 20 MG: 20 TABLET ORAL at 08:24

## 2022-12-16 NOTE — RESPIRATORY THERAPY NOTE
12/15/22 2200   Respiratory Assessment   Assessment Type Assess only   General Appearance Alert; Awake   Respiratory Pattern Normal   Chest Assessment Chest expansion symmetrical   Bilateral Breath Sounds Diminished   Location Specific No   Cough None   Resp Comments Pt placed on bipap at this time   O2 Device V60   Non-Invasive Information   O2 Interface Device Face mask   Non-Invasive Ventilation Mode BiPAP   $ Continous NIV Subsequent   $ Intermittent NIV Yes   SpO2 90 %   $ Pulse Oximetry Spot Check Charge Completed   Non-Invasive Settings   IPAP (cm) 25 cm   EPAP (cm) 15 cm   Rate (Set) 10   FiO2 (%) 60   Flow (lpm) 8   Pressure Support (cm H2O) 10   Rise Time 2   Inspiratory Time (Set) 0 8   Temperature (Set) 31   Non-Invasive Readings   Skin Intervention Skin intact   Total Rate 18   Vt (mL) (Mech) 711   MV (Mech) 17 9   Peak Pressure (Obs) 23   Spontaneous Vt (mL) 598   Leak (lpm) 31   Spontaneous MV (mL) 18 5   Non-Invasive Alarms   Insp Pressure High (cm H20) 25   Insp Pressure Low (cm H20) 8   Low Insp Pressure Time (sec) 20 sec   MV Low (L/min) 3   Vt High (mL) 1500   Vt Low (mL) 350   High Resp Rate (BPM) 35 BPM   Low Resp Rate (BPM) 8 BPM   Apnea Interval (sec) 20   Apnea Rate 10     RT Ventilator Management Note  Radha Camargo 46 y o  male MRN: [id number]  Unit/Bed#: -02 Encounter: [id number]      Daily Screen    No data found in the last 10 encounters             Physical Exam:   Assessment Type: Assess only  General Appearance: Alert, Awake  Respiratory Pattern: Normal  Chest Assessment: Chest expansion symmetrical  Bilateral Breath Sounds: Diminished  Location Specific: No  Cough: None  O2 Device: V60      Resp Comments: Pt placed on bipap at this time

## 2022-12-16 NOTE — TELEPHONE ENCOUNTER
Patient scheduled with Dr Hernandez Feeling 1/26 at 11:20  Patient was informed of location which is [facility] and time and date of appt

## 2022-12-16 NOTE — PLAN OF CARE
Problem: CARDIOVASCULAR - ADULT  Goal: Maintains optimal cardiac output and hemodynamic stability  Description: INTERVENTIONS:  - Monitor I/O, vital signs and rhythm  - Monitor for S/S and trends of decreased cardiac output  - Administer and titrate ordered vasoactive medications to optimize hemodynamic stability  - Assess quality of pulses, skin color and temperature  - Assess for signs of decreased coronary artery perfusion  - Instruct patient to report change in severity of symptoms  12/16/2022 0409 by Eusebio Lee RN  Outcome: Progressing  12/16/2022 0409 by Eusebio Lee RN  Outcome: Progressing  Goal: Absence of cardiac dysrhythmias or at baseline rhythm  Description: INTERVENTIONS:  - Continuous cardiac monitoring, vital signs, obtain 12 lead EKG if ordered  - Administer antiarrhythmic and heart rate control medications as ordered  - Monitor electrolytes and administer replacement therapy as ordered  12/16/2022 0409 by Eusebio Lee RN  Outcome: Progressing  12/16/2022 0409 by Eusebio Lee RN  Outcome: Progressing     Problem: RESPIRATORY - ADULT  Goal: Achieves optimal ventilation and oxygenation  Description: INTERVENTIONS:  - Assess for changes in respiratory status  - Assess for changes in mentation and behavior  - Position to facilitate oxygenation and minimize respiratory effort  - Oxygen administered by appropriate delivery if ordered  - Initiate smoking cessation education as indicated  - Encourage broncho-pulmonary hygiene including cough, deep breathe, Incentive Spirometry  - Assess the need for suctioning and aspirate as needed  - Assess and instruct to report SOB or any respiratory difficulty  - Respiratory Therapy support as indicated  12/16/2022 0409 by Eusebio Lee RN  Outcome: Progressing  12/16/2022 0409 by Eusebio Lee RN  Outcome: Progressing     Problem: METABOLIC, FLUID AND ELECTROLYTES - ADULT  Goal: Electrolytes maintained within normal limits  Description: INTERVENTIONS:  - Monitor labs and assess patient for signs and symptoms of electrolyte imbalances  - Administer electrolyte replacement as ordered  - Monitor response to electrolyte replacements, including repeat lab results as appropriate  - Instruct patient on fluid and nutrition as appropriate  12/16/2022 0409 by Ankit Cardenas RN  Outcome: Progressing  12/16/2022 0409 by Ankit Cardenas RN  Outcome: Progressing  Goal: Glucose maintained within target range  Description: INTERVENTIONS:  - Monitor Blood Glucose as ordered  - Assess for signs and symptoms of hyperglycemia and hypoglycemia  - Administer ordered medications to maintain glucose within target range  - Assess nutritional intake and initiate nutrition service referral as needed  12/16/2022 0409 by Ankit Cardenas RN  Outcome: Progressing  12/16/2022 0409 by Ankit Cardenas RN  Outcome: Progressing

## 2022-12-16 NOTE — ASSESSMENT & PLAN NOTE
+ Noted O2 saturation 70% on room air, was on high flow, currently on RA  Awaiting official RT eval for any home o2 needs  · COVID-19 PCR + 12/1/2022  · CT PE protocol consistent with cardiomegaly/right lower lobe pneumonia/pulmonary hypertension  No PE   · CRP 20 2 -><3  · ddimer 3 5 - >0 69  · S/p IV Decadron  · S/p remdesivir  · s/p baricitinib [day 14/14]  · Initially on heparin drip - > now transitioned to eliquis  · Procalcitonin x2 negative

## 2022-12-16 NOTE — RESPIRATORY THERAPY NOTE
Home Oxygen Qualifying Test     Patient name: Josefa Villagran        : 1970   Date of Test:  2022  Diagnosis:    Home Oxygen Test:    **Medicare Guidelines require item(s) 1-5 on all ambulatory patients or 1 and 2 on non-ambulatory patients  1  Baseline SPO2 on Room Air at rest 87 %   a  If <= 88% on Room Air add O2 via NC to obtain SpO2 >=88%  If LPM needed, document LPM  needed to reach =>88%    2  SPO2 during exertion on Room Air 85  %  a  During exertion monitor SPO2  If SPO2 increases >=89%, do not add supplemental oxygen    3  SPO2 on Oxygen at Rest 95 % at 3 LPM    4  SPO2 during exertion on Oxygen 93 % at 3 LPM    5  Test performed during exertion activity  [x]  Supplemental Home Oxygen is indicated  []  Client does not qualify for home oxygen  Respiratory Additional Notes- Pt   Walked 400 ft     Guillermo Gibson, RT

## 2022-12-16 NOTE — RESPIRATORY THERAPY NOTE
12/16/22 0402   Respiratory Assessment   Assessment Type Assess only   General Appearance Sleeping   Respiratory Pattern Normal   Chest Assessment Chest expansion symmetrical   Bilateral Breath Sounds Diminished   R Breath Sounds Diminished   L Breath Sounds Diminished   Location Specific No   Cough None   Resp Comments Pt remains oin Bipap   O2 Device V60   Non-Invasive Information   O2 Interface Device Face mask   Non-Invasive Ventilation Mode BiPAP   $ Intermittent NIV Yes   SpO2 92 %   $ Pulse Oximetry Spot Check Charge Completed   Non-Invasive Settings   IPAP (cm) 25 cm   EPAP (cm) 15 cm   Rate (Set) 10   FiO2 (%) 60   Flow (lpm) 8   Pressure Support (cm H2O) 10   Rise Time 2   Inspiratory Time (Set) 0 8   Non-Invasive Readings   Skin Intervention Skin intact   Total Rate 14   Vt (mL) (Mech) 731   MV (Mech) 10   Peak Pressure (Obs) 23   Spontaneous Vt (mL) 725   Spontaneous MV (mL) 9 5   Non-Invasive Alarms   Insp Pressure High (cm H20) 25   Insp Pressure Low (cm H20) 8   Low Insp Pressure Time (sec) 20 sec   MV Low (L/min) 3   Vt High (mL) 1500   Vt Low (mL) 350   High Resp Rate (BPM) 35 BPM   Low Resp Rate (BPM) 8 BPM   Apnea Interval (sec) 20   Apnea Rate 10     RT Ventilator Management Note  Chiki Delacruz 46 y o  male MRN: [id number]  Unit/Bed#: -02 Encounter: [id number]      Daily Screen    No data found in the last 10 encounters             Physical Exam:   Assessment Type: Assess only  General Appearance: Sleeping  Respiratory Pattern: Normal  Chest Assessment: Chest expansion symmetrical  Bilateral Breath Sounds: Diminished  R Breath Sounds: Diminished  L Breath Sounds: Diminished  Location Specific: No  Cough: None  O2 Device: V60      Resp Comments: Pt remains oin Bipap

## 2022-12-16 NOTE — TELEPHONE ENCOUNTER
----- Message from Lidia Mello PA-C sent at 12/16/2022  1:12 PM EST -----  Pt was sent home today    Please get him an appt with Dr Beth Luna, not sure if he is willing to go to [place] to see her or plan for appt at [facility] when avail

## 2022-12-16 NOTE — DISCHARGE SUMMARY
[address] [facility]  Discharge- Piper Madison 1970, 46 y o  male MRN: [id number]  Unit/Bed#: -02 Encounter: [id number]  Primary Care Provider: No primary care provider on file  Date and time admitted to hospital: 12/1/2022  5:52 PM    * Acute respiratory failure due to COVID-19 [facility])  Assessment & Plan  + Noted O2 saturation 70% on room air, was on high flow, currently on RA  Awaiting official RT eval for any home o2 needs  · COVID-19 PCR + 12/1/2022  · CT PE protocol consistent with cardiomegaly/right lower lobe pneumonia/pulmonary hypertension  No PE   · CRP 20 2 -><3  · ddimer 3 5 - >0 69  · S/p IV Decadron  · S/p remdesivir  · s/p baricitinib [day 14/14]  · Initially on heparin drip - > now transitioned to eliquis  · Procalcitonin x2 negative  CHF (congestive heart failure) ([facility])  Assessment & Plan  · CT PE protocol noting evidence of pulmonary hypertension  · Echocardiogram notes right ventricular cavity size severely dilated  · Concern for acute on chronic right heart failure exacerbation  · s/p IV Lasix 80 twice daily -> transitioned to po bumex 2 BID  · -16 6L so far, weight down by 50lbs  · Appreciate cardiology input  Cleared for dc  OP fu          Elevated serum creatinine  Assessment & Plan  · Creatinine on admission 1 39, baseline around 1 in setting of COVID-19 infection verses possibility of onset of cardiorenal syndrome with CHF history  · On diuresis  · Creatinine normalised      Obstructive sleep apnea  Assessment & Plan  · BIPAP QHS  · Needs OP sleep study    Pulmonary hypertension ([facility] [address] )  Assessment & Plan  · Pulmonary hypertension noted on CTA chest, patient with history  · Continue home sildenafil t i d     Morbid obesity ([address] )  Assessment & Plan  · Encouraged weight loss    Mixed hyperlipidemia  Assessment & Plan  · On statin      Discharging Physician / Practitioner: Rivera Graham MD  PCP: No primary care provider on file    Admission Date:   Admission Orders (From admission, onward)     Ordered        12/01/22 2146  INPATIENT ADMISSION  Once                      Discharge Date: 12/16/22    Disposition:    · home    Reason for Admission: sob    Discharge Diagnoses:     Please see assessment and plan section above for further details regarding discharge diagnoses  Medical Problems     Resolved Problems  Date Reviewed: 12/6/2022   None         Consultations During Hospital Stay:  · Cardiology  · pulmonology    Medication Adjustments and Discharge Medications:  · Summary of Medication Adjustments made as a result of this hospitalization: see med rec  · Medication Dosing Tapers - Please refer to Discharge Medication List for details on any medication dosing tapers (if applicable to patient)  · Medications being temporarily held (include recommended restart time): see med rec  · Discharge Medication List: See after visit summary for reconciled discharge medications  Diet Recommendations at Discharge:  Diet -        Diet Orders   (From admission, onward)             Start     Ordered    12/06/22 1103  Diet Cardiovascular; Cardiac; Fluid Restriction 1500 ML, Consistent Carbohydrate Diet Level 2 (5 carb servings/75 grams CHO/meal)  Diet effective now        References:    Nutrtion Support Algorithm Enteral vs  Parenteral   Question Answer Comment   Diet Type Cardiovascular    Cardiac Cardiac    Other Restriction(s): Fluid Restriction 1500 ML    Other Restriction(s): Consistent Carbohydrate Diet Level 2 (5 carb servings/75 grams CHO/meal)    RD to adjust diet per protocol? Yes        12/06/22 1105                Hospital Course:     Piper Madison is a 46 y o  male patient who originally presented to the hospital on 12/1/2022 with underlying history of COPD/morbid obesity/obstructive sleep apnea/pulmonary hypertension/CHF who presented with symptoms of progressive worsening shortness of breath for few months worsening over few days    Patient tested positive for COVID at urgent care center and was asked to come to ER for further evaluation  Initially admitted for acute hypoxic respiratory failure secondary to COVID/CHF exacerbation  · Patient with extensive hospitalization for 15 days  Admitted with acute hypoxic respiratory failure initially requiring high flow, currently on room air  Awaiting official respiratory therapy evaluation for any home O2 needs  · Patient was COVID-19 PCR positive on 12/1/2022  CT PE noted right lower lobe pneumonia with pulmonary hypertension no PE  Inflammatory markers back to normal range  Patient s/p IV Decadron remdesivir and baricitinib  Received heparin drip now transition to Eliquis  · As regards CHF exacerbation, echocardiogram noted right ventricular cavity size severely dilated  Concern for acute on chronic right heart failure exacerbation  Initially on IV diuresis with Lasix, transition to oral Bumex 2 mg twice daily  Patient -16 6 L during hospitalization and weight down by approximately 50 pounds  Appreciate cardiology input  Cleared for discharge and outpatient follow-up recommended  · For obstructive sleep apnea, patient will need an outpatient sleep study  Was using BiPAP nightly during hospitalization  · For pulmonary hypertension, continue home sildenafil 3 times daily  · Awaiting official RT evaluation for home oxygen needs  Seen by PT OT and cleared for discharge home  All questions answered at bedside  Patient verbalized understanding and is in agreement with plan above  Needs outpatient follow-up with PCP/cardiology/sleep study  Discharge home today      Condition at Discharge: fair     Discharge Day Visit / Exam:     Vitals: Blood Pressure: 112/80 (12/16/22 0723)  Pulse: 92 (12/16/22 0723)  Temperature: 98 6 °F (37 °C) (12/16/22 0723)  Temp Source: Oral (12/14/22 1100)  Respirations: 18 (12/16/22 0723)  Height: 5' 11" (180 3 cm) (12/15/22 1057)  Weight - Scale: (!) 149 kg (329 lb 5 9 oz) (12/16/22 4220)  SpO2: (!) 89 % (12/16/22 0786)  Exam:   Physical Exam  Constitutional:       General: He is not in acute distress  Appearance: He is obese  He is not toxic-appearing  HENT:      Mouth/Throat:      Mouth: Mucous membranes are moist       Pharynx: Oropharynx is clear  Cardiovascular:      Rate and Rhythm: Normal rate and regular rhythm  Pulses: Normal pulses  Pulmonary:      Effort: Pulmonary effort is normal  No respiratory distress  Breath sounds: Normal breath sounds  No wheezing or rales  Abdominal:      General: Bowel sounds are normal  There is distension  Palpations: Abdomen is soft  Tenderness: There is no abdominal tenderness  Musculoskeletal:      Right lower leg: Edema present  Left lower leg: Edema present  Neurological:      General: No focal deficit present  Mental Status: He is alert and oriented to person, place, and time  Goals of Care Discussions:  · Code Status at Discharge: Level 1 - Full Code    Discharge instructions/Information to patient and family:   See after visit summary section titled Discharge Instructions for information provided to patient and family  Discharge Statement:  I spent 45 minutes discharging the patient  This time was spent on the day of discharge  I had direct contact with the patient on the day of discharge  Greater than 50% of the total time was spent examining patient, answering all patient questions, arranging and discussing plan of care with patient as well as directly providing post-discharge instructions  Additional time then spent on discharge activities  ** Please Note: This note has been constructed using a voice recognition system **      · Seen by respiratory therapy, patient requiring 3 L of oxygen at rest and on exertion  Being arranged at discharge

## 2022-12-16 NOTE — ASSESSMENT & PLAN NOTE
· Creatinine on admission 1 39, baseline around 1 in setting of COVID-19 infection verses possibility of onset of cardiorenal syndrome with CHF history  · On diuresis  · Creatinine normalised

## 2022-12-16 NOTE — PROGRESS NOTES
Cardiology Progress Note - Jenny Langston 46 y o  male MRN: [id number]    Unit/Bed#: -02 Encounter: [id number]      Assessment/Plan:  1  Acute on chronic RV failure/HFpEF, appears pretty euvolemic, Switched to oral diuretics, Bumex 2mg bid  Net -2L, total >33L, weight down >50+lbs  reinforced salt restriction  Daily weights  2  Pneumonia secondary to COVID, currently off oxygen  Mgmt per SLIM    3  PAF, hr stable 's, increase toprol to 75mg qd  Continue eliquis  4  Pulmonary HTN, on Revatio, limited echo showed Normal PASP  5  HTN, stable 112/80, continue bumex, metoprolol, revatio    6  HPL on lipitor    7  Obesity, BMI 45, weight loss advised    Pt is stable from cardiac standpoint for discharge, plan for close outpt followup      Subjective:   Patient seen and examined  No significant events overnight  Im feeling good  Denies chest pain    Objective:     Vitals: Blood pressure 112/80, pulse 92, temperature 98 6 °F (37 °C), resp  rate 18, height 5' 11" (1 803 m), weight (!) 149 kg (329 lb 5 9 oz), SpO2 (!) 89 %  , Body mass index is 45 94 kg/m² ,   Orthostatic Blood Pressures    Flowsheet Row Most Recent Value   Blood Pressure 112/80 filed at 12/16/2022 4980   Patient Position - Orthostatic VS Sitting filed at 12/14/2022 1100            Intake/Output Summary (Last 24 hours) at 12/16/2022 1008  Last data filed at 12/16/2022 0900  Gross per 24 hour   Intake 1310 ml   Output 2275 ml   Net -965 ml         Physical Exam:    GEN: Jenny Langston appears well, alert and oriented x 3, pleasant and cooperative   HEENT: pupils equal, round, and reactive to light; extraocular muscles intact  NECK: supple, no carotid bruits   HEART: Irregularly irregular, normal S1 and S2, no murmurs, clicks, gallops or rubs   LUNGS: decreased breath sounds bilaterally; no wheezes, rales, or rhonchi   ABDOMEN: normal bowel sounds, soft, no tenderness, no distention  EXTREMITIES: + trace edema, peripheral pulses normal; no clubbing, cyanosis      Medications:      Current Facility-Administered Medications:   •  acetaminophen (TYLENOL) tablet 650 mg, 650 mg, Oral, Q6H PRN, Raines Gonzalez, CRNP  •  albuterol (PROVENTIL HFA,VENTOLIN HFA) inhaler 2 puff, 2 puff, Inhalation, Q4H PRN, Raines Gonzalez, CRNP  •  apixaban (ELIQUIS) tablet 5 mg, 5 mg, Oral, BID, Raines Gonzalez, CRNP, 5 mg at 12/16/22 3703  •  atorvastatin (LIPITOR) tablet 10 mg, 10 mg, Oral, Daily With Dinner, Raines Gonzalez, CRNP, 10 mg at 12/15/22 1710  •  bumetanide (BUMEX) tablet 2 mg, 2 mg, Oral, BID, Leta Marrero MD, 2 mg at 12/16/22 0825  •  dextromethorphan-guaiFENesin (ROBITUSSIN DM) oral syrup 10 mL, 10 mL, Oral, Q4H PRN, Raines Gonzalez, CRNP, 10 mL at 12/02/22 1724  •  [START ON 12/17/2022] metoprolol succinate (TOPROL-XL) 24 hr tablet 75 mg, 75 mg, Oral, Daily, Leta Marrero MD  •  sildenafil (REVATIO) tablet 20 mg, 20 mg, Oral, TID, Raines Gonzalez, CRNP, 20 mg at 12/16/22 0824     Labs & Results:        Results from last 7 days   Lab Units 12/16/22  0441 12/15/22  0620 12/14/22  0454   WBC Thousand/uL 9 79 9 34 9 20   HEMOGLOBIN g/dL 15 2 15 4 14 7   HEMATOCRIT % 49 6* 50 2* 49 0   PLATELETS Thousands/uL 209 186 203         Results from last 7 days   Lab Units 12/16/22  0441 12/15/22  0621 12/14/22  0454   POTASSIUM mmol/L 3 7 4 0 3 9   CHLORIDE mmol/L 100 101 100   CO2 mmol/L 32 31 33*   BUN mg/dL 30* 35* 35*   CREATININE mg/dL 1 10 1 09 1 19   CALCIUM mg/dL 8 8 9 1 8 8     Results from last 7 days   Lab Units 12/12/22  1138 12/12/22  0452 12/11/22  1928   PTT seconds 98* 96* 83*     Results from last 7 days   Lab Units 12/12/22  0452 12/10/22  0311   MAGNESIUM mg/dL 2 0 2 0

## 2022-12-16 NOTE — TELEPHONE ENCOUNTER
Patient scheduled for 12/20 aware [facility] [address]   Address given

## 2022-12-16 NOTE — ASSESSMENT & PLAN NOTE
· CT PE protocol noting evidence of pulmonary hypertension  · Echocardiogram notes right ventricular cavity size severely dilated  · Concern for acute on chronic right heart failure exacerbation  · s/p IV Lasix 80 twice daily -> transitioned to po bumex 2 BID  · -16 6L so far, weight down by 50lbs  · Appreciate cardiology input  Cleared for dc   OP fu

## 2022-12-19 ENCOUNTER — TELEPHONE (OUTPATIENT)
Dept: PULMONOLOGY | Facility: CLINIC | Age: 52
End: 2022-12-19

## 2022-12-20 ENCOUNTER — OFFICE VISIT (OUTPATIENT)
Dept: CARDIOLOGY CLINIC | Facility: CLINIC | Age: 52
End: 2022-12-20

## 2022-12-20 VITALS
SYSTOLIC BLOOD PRESSURE: 110 MMHG | WEIGHT: 315 LBS | OXYGEN SATURATION: 98 % | RESPIRATION RATE: 18 BRPM | DIASTOLIC BLOOD PRESSURE: 80 MMHG | HEIGHT: 71 IN | BODY MASS INDEX: 44.1 KG/M2 | HEART RATE: 96 BPM

## 2022-12-20 DIAGNOSIS — E78.2 MIXED HYPERLIPIDEMIA: Primary | ICD-10-CM

## 2022-12-20 DIAGNOSIS — J96.00 ACUTE RESPIRATORY FAILURE DUE TO COVID-19 (HCC): ICD-10-CM

## 2022-12-20 DIAGNOSIS — I27.20 PULMONARY HYPERTENSION (HCC): ICD-10-CM

## 2022-12-20 DIAGNOSIS — U07.1 ACUTE RESPIRATORY FAILURE DUE TO COVID-19 (HCC): ICD-10-CM

## 2022-12-20 DIAGNOSIS — I48.91 A-FIB (HCC): ICD-10-CM

## 2022-12-20 RX ORDER — BUMETANIDE 1 MG/1
1 TABLET ORAL 2 TIMES DAILY
Qty: 60 TABLET | Refills: 2 | Status: SHIPPED | OUTPATIENT
Start: 2022-12-20 | End: 2023-01-19

## 2022-12-20 NOTE — PROGRESS NOTES
Cardiology Follow Up    Danita Hebert  1970  [id number]  [address] [place] [id number] [phone]    Discussion/Summary:  1  RV failure  2  PNA/COVID  3  Atrial fibrillation on anticoagulation  4  Pulmonary hypertension on sildenafuil  5  Severe BISHNU - not compliant with CPAP  Making arrangement to get a CPAP  6  Hypertension    RV failure:  Cont with bumex  Will check BMP    Afib: On anticoagulation  Discussed with patient regarding causes, pathogenesis and natural history of atrial fibrillation  Discussed risk of stroke and cardioembolism with atrial fibrillation  Discussed role of anticoagulation in preventing stroke  Discussed indications, risk, benefit and alternatives to anticoagulation  Following detail discussion, patient  agrees to anticoagulation  Patient demonstrates clear understanding and ability to follow recommendations    Pulmonary hypertension:  Follow up with HF/pulmonary hypertension clinic  Has appt scheduled  Dyslipidemia:  Cont with statin    Recommend patient presents to the emergency room or call our office if he has any new/persistent or progressive symptoms  Follow up in 3 months    Previous studies were reviewed  Safety measures were reviewed  Questions were entertained and answered  Patient was advised to report any problems requiring medical attention  Follow-up with PCP and appropriate specialist and lab work as discussed  Return for follow up visit as scheduled or earlier, if needed  Thank you for allowing me to participate in the care and evaluation of your patient  Should you have any questions, please feel free to contact me  History of Present Illness:     Danita Hebert is a 46 y o  male who presents for follow up for cardiovascular care      Denies chest pain, chest pressure, shortness of breath, dizziness, lightheadedness, presyncope or syncope  Denies orthopnea, PND or lower limb edema  Patient works as a     Echo 12/15/22: This was a limited study performed to assess right ventricular function and for pulmonary hypertension  The right ventricle was severely dilated  Systolic function was mildly reduced  There was trace tricuspid regurgitation  The IVC was mildly dilated  PASP was normal based upon the doppler interrogation obtained  Echo 12/2/22: This is a limited echocardiogram   Technically difficult study   Left ventricular size and function appears grossly normal    Right ventricle appears dilated with reduced function  There is interventricular septal bounce  CTA chest 12/1/22:  1  Mild to moderate cardiomegaly with small right pleural effusion  2  Enlarged pulmonary artery suggesting pulmonary hypertension  3  Subsegmental atelectasis versus pneumonia right lower lobe  4  No evidence of pulmonary embolism given limitations  5  Small perihepatic ascites      Patient Active Problem List   Diagnosis   • CHF (congestive heart failure) ([facility])   • COPD mixed type ([facility] )   • Hypertension, essential   • Impaired fasting glucose   • Left ventricular hypertrophy   • Mixed hyperlipidemia   • Morbid obesity ([facility] )   • Obstructive sleep apnea   • Noncompliance with CPAP treatment   • Pulmonary hypertension ([facility])   • Acute respiratory failure due to COVID-19 ([facility] )   • Elevated serum creatinine     Past Medical History:   Diagnosis Date   • CHF (congestive heart failure) ([facility] )    • Hypertension    • Sleep apnea      Social History     Socioeconomic History   • Marital status: /Civil Union     Spouse name: Not on file   • Number of children: Not on file   • Years of education: Not on file   • Highest education level: Not on file   Occupational History   • Not on file   Tobacco Use   • Smoking status: Former     Types: Cigarettes   • Smokeless tobacco: Never   Substance and Sexual Activity   • Alcohol use: Not Currently   • Drug use: Not Currently   • Sexual activity: Not on file   Other Topics Concern   • Not on file   Social History Narrative   • Not on file     Social Determinants of Health     Financial Resource Strain: Not on file   Food Insecurity: No Food Insecurity   • Worried About Running Out of Food in the Last Year: Never true   • Ran Out of Food in the Last Year: Never true   Transportation Needs: No Transportation Needs   • Lack of Transportation (Medical): No   • Lack of Transportation (Non-Medical): No   Physical Activity: Not on file   Stress: Not on file   Social Connections: Not on file   Intimate Partner Violence: Not on file   Housing Stability: Unknown   • Unable to Pay for Housing in the Last Year: No   • Number of Places Lived in the Last Year: Not on file   • Unstable Housing in the Last Year: No      History reviewed  No pertinent family history  History reviewed  No pertinent surgical history      Current Outpatient Medications:   •  apixaban (ELIQUIS) 5 mg, Take 1 tablet (5 mg total) by mouth 2 (two) times a day, Disp: 60 tablet, Rfl: 0  •  bumetanide (BUMEX) 2 mg tablet, Take 1 tablet (2 mg total) by mouth 2 (two) times a day, Disp: 60 tablet, Rfl: 0  •  famotidine (PEPCID) 40 MG tablet, Take 40 mg by mouth, Disp: , Rfl:   •  metoprolol succinate (TOPROL-XL) 25 mg 24 hr tablet, Take 3 tablets (75 mg total) by mouth daily Do not start before December 17, 2022 , Disp: 90 tablet, Rfl: 0  •  potassium chloride (Klor-Con) 10 mEq tablet, Take 10 mEq by mouth daily, Disp: , Rfl:   •  rosuvastatin (CRESTOR) 5 mg tablet, Take 5 mg by mouth daily, Disp: , Rfl:   •  sildenafil (REVATIO) 20 mg tablet, Take 20 mg by mouth Three times a day, Disp: , Rfl:   No Known Allergies    Labs:  Lab Results   Component Value Date    WBC 9 79 12/16/2022    HGB 15 2 12/16/2022    HCT 49 6 (H) 12/16/2022    MCV 79 (L) 12/16/2022     12/16/2022     Lab Results   Component Value Date    GLUCOSE 143 (H) 12/03/2022    CALCIUM 8 8 12/16/2022    K 3 7 12/16/2022    CO2 32 12/16/2022     12/16/2022    BUN 30 (H) 12/16/2022    CREATININE 1 10 12/16/2022     Lab Results   Component Value Date    HGBA1C 6 0 (H) 03/21/2020     No results found for: CHOL  No results found for: HDL  No results found for: LDLCALC  No results found for: TRIG  No results found for: CHOLHDL    Review of Systems:  Review of Systems    Physical Exam:  Physical Exam

## 2022-12-28 DIAGNOSIS — R79.0 LOW MAGNESIUM LEVEL: Primary | ICD-10-CM

## 2022-12-28 RX ORDER — CALCIUM CARBONATE/VITAMIN D3 500-10/5ML
LIQUID (ML) ORAL
Qty: 5 TABLET | Refills: 0 | Status: SHIPPED | OUTPATIENT
Start: 2022-12-28

## 2023-01-11 ENCOUNTER — APPOINTMENT (EMERGENCY)
Dept: RADIOLOGY | Facility: HOSPITAL | Age: 53
End: 2023-01-11

## 2023-01-11 ENCOUNTER — TELEPHONE (OUTPATIENT)
Dept: CARDIOLOGY CLINIC | Facility: CLINIC | Age: 53
End: 2023-01-11

## 2023-01-11 ENCOUNTER — HOSPITAL ENCOUNTER (EMERGENCY)
Facility: HOSPITAL | Age: 53
Discharge: HOME | End: 2023-01-11
Attending: EMERGENCY MEDICINE

## 2023-01-11 VITALS
DIASTOLIC BLOOD PRESSURE: 70 MMHG | HEART RATE: 109 BPM | TEMPERATURE: 97.1 F | OXYGEN SATURATION: 95 % | RESPIRATION RATE: 18 BRPM | SYSTOLIC BLOOD PRESSURE: 110 MMHG

## 2023-01-11 DIAGNOSIS — I48.91 ATRIAL FIBRILLATION WITH RVR (HCC): Primary | ICD-10-CM

## 2023-01-11 LAB
2HR DELTA HS TROPONIN: 1 NG/L
ALBUMIN SERPL BCP-MCNC: 3.7 G/DL (ref 3.5–5)
ALP SERPL-CCNC: 70 U/L (ref 46–116)
ALT SERPL W P-5'-P-CCNC: 42 U/L (ref 12–78)
ANION GAP SERPL CALCULATED.3IONS-SCNC: 9 MMOL/L (ref 4–13)
APTT PPP: 28 SECONDS (ref 23–37)
AST SERPL W P-5'-P-CCNC: 40 U/L (ref 5–45)
ATRIAL RATE: 101 BPM
ATRIAL RATE: 150 BPM
BASOPHILS # BLD AUTO: 0.02 THOUSANDS/ÂΜL (ref 0–0.1)
BASOPHILS NFR BLD AUTO: 0 % (ref 0–1)
BILIRUB SERPL-MCNC: 0.77 MG/DL (ref 0.2–1)
BUN SERPL-MCNC: 43 MG/DL (ref 5–25)
CALCIUM SERPL-MCNC: 9.5 MG/DL (ref 8.3–10.1)
CARDIAC TROPONIN I PNL SERPL HS: 7 NG/L
CARDIAC TROPONIN I PNL SERPL HS: 8 NG/L
CHLORIDE SERPL-SCNC: 102 MMOL/L (ref 96–108)
CO2 SERPL-SCNC: 28 MMOL/L (ref 21–32)
CREAT SERPL-MCNC: 1.4 MG/DL (ref 0.6–1.3)
EOSINOPHIL # BLD AUTO: 0.13 THOUSAND/ÂΜL (ref 0–0.61)
EOSINOPHIL NFR BLD AUTO: 2 % (ref 0–6)
ERYTHROCYTE [DISTWIDTH] IN BLOOD BY AUTOMATED COUNT: 16 % (ref 11.6–15.1)
GFR SERPL CREATININE-BSD FRML MDRD: 57 ML/MIN/1.73SQ M
GLUCOSE SERPL-MCNC: 134 MG/DL (ref 65–140)
HCT VFR BLD AUTO: 51.9 % (ref 36.5–49.3)
HGB BLD-MCNC: 15.9 G/DL (ref 12–17)
IMM GRANULOCYTES # BLD AUTO: 0.02 THOUSAND/UL (ref 0–0.2)
IMM GRANULOCYTES NFR BLD AUTO: 0 % (ref 0–2)
INR PPP: 1.11 (ref 0.84–1.19)
LYMPHOCYTES # BLD AUTO: 2.26 THOUSANDS/ÂΜL (ref 0.6–4.47)
LYMPHOCYTES NFR BLD AUTO: 26 % (ref 14–44)
MCH RBC QN AUTO: 24.6 PG (ref 26.8–34.3)
MCHC RBC AUTO-ENTMCNC: 30.6 G/DL (ref 31.4–37.4)
MCV RBC AUTO: 80 FL (ref 82–98)
MONOCYTES # BLD AUTO: 0.95 THOUSAND/ÂΜL (ref 0.17–1.22)
MONOCYTES NFR BLD AUTO: 11 % (ref 4–12)
NEUTROPHILS # BLD AUTO: 5.46 THOUSANDS/ÂΜL (ref 1.85–7.62)
NEUTS SEG NFR BLD AUTO: 61 % (ref 43–75)
NRBC BLD AUTO-RTO: 0 /100 WBCS
PLATELET # BLD AUTO: 320 THOUSANDS/UL (ref 149–390)
PMV BLD AUTO: 10.2 FL (ref 8.9–12.7)
POTASSIUM SERPL-SCNC: 4.6 MMOL/L (ref 3.5–5.3)
PROT SERPL-MCNC: 7.8 G/DL (ref 6.4–8.4)
PROTHROMBIN TIME: 14.1 SECONDS (ref 11.6–14.5)
QRS AXIS: 107 DEGREES
QRS AXIS: 170 DEGREES
QRSD INTERVAL: 158 MS
QRSD INTERVAL: 162 MS
QT INTERVAL: 364 MS
QT INTERVAL: 368 MS
QTC INTERVAL: 547 MS
QTC INTERVAL: 551 MS
RBC # BLD AUTO: 6.47 MILLION/UL (ref 3.88–5.62)
SODIUM SERPL-SCNC: 139 MMOL/L (ref 135–147)
T WAVE AXIS: -24 DEGREES
T WAVE AXIS: 8 DEGREES
VENTRICULAR RATE: 133 BPM
VENTRICULAR RATE: 138 BPM
WBC # BLD AUTO: 8.84 THOUSAND/UL (ref 4.31–10.16)

## 2023-01-11 RX ORDER — METOPROLOL TARTRATE 5 MG/5ML
5 INJECTION INTRAVENOUS ONCE
Status: COMPLETED | OUTPATIENT
Start: 2023-01-11 | End: 2023-01-11

## 2023-01-11 RX ORDER — METOPROLOL TARTRATE 50 MG/1
50 TABLET, FILM COATED ORAL ONCE
Status: COMPLETED | OUTPATIENT
Start: 2023-01-11 | End: 2023-01-11

## 2023-01-11 RX ADMIN — METOPROLOL TARTRATE 50 MG: 50 TABLET, FILM COATED ORAL at 14:19

## 2023-01-11 RX ADMIN — SODIUM CHLORIDE 250 ML: 0.9 INJECTION, SOLUTION INTRAVENOUS at 14:19

## 2023-01-11 RX ADMIN — METOROPROLOL TARTRATE 5 MG: 5 INJECTION, SOLUTION INTRAVENOUS at 13:12

## 2023-01-11 NOTE — DISCHARGE INSTRUCTIONS
Continue taking metoprolol and your other home medications  Please follow-up with cardiology and your family doctor  Return to the ER with any worsening symptoms, chest pain, shortness of breath, palpitations, passing out

## 2023-01-11 NOTE — TELEPHONE ENCOUNTER
Pt stopped in at [address] w/ an EKG from his PCP that he had just gotten done today & his PCP told pt to go see his Cardio doct   I showed Dr Stewart [place] the EKG & he came out to speak w/ the pt  I scanned the EKG in [employer]

## 2023-01-11 NOTE — TELEPHONE ENCOUNTER
Patient was in atrial fibrillation with RVR  He was advised to proceed to the emergency department for further evaluation

## 2023-01-11 NOTE — ED PROVIDER NOTES
History  Chief Complaint   Patient presents with   • Abnormal ECG     Patient was seen at PCP today for dizziness  Patient sent to ER for abnormal EKG  47yo male with a history of pulmonary hypertension, CHF, hypertension, atrial fibrillation on Eliquis, obesity, and BISHNU presenting for evaluation of an abnormal EKG  Patient was recently admitted for a CHF exacerbation last month and acute hypoxic respiratory failure in setting of COVID infection  He was diuresed and discharged on Bumex  He was seen in follow-up by his PCP today in the office  He states he has lightheadedness after taking his morning blood pressure medications and his medications were adjusted  An EKG was done in the office which was reportedly abnormal  He brought the EKG to his cardiologist who advised him to go to the ED  He is asymptomatic at this time  He denies any chest pain, palpitations shortness of breath, syncope  No currently dizziness  History provided by:  Patient and medical records   used: No    Evaluation of Abnormal Diagnostic Test  Time since result:  Hours  Patient referred by:  PCP  Resulting agency:  External  Result type: cardiac    Cardiac:     ECG: abnormal        Prior to Admission Medications   Prescriptions Last Dose Informant Patient Reported? Taking? Magnesium Oxide 400 MG CAPS   No No   Sig: Take one table once daily for five days   apixaban (ELIQUIS) 5 mg   No No   Sig: Take 1 tablet (5 mg total) by mouth 2 (two) times a day   bumetanide (BUMEX) 1 mg tablet   No No   Sig: Take 1 tablet (1 mg total) by mouth 2 (two) times a day   famotidine (PEPCID) 40 MG tablet   Yes No   Sig: Take 40 mg by mouth   metoprolol succinate (TOPROL-XL) 25 mg 24 hr tablet   No No   Sig: Take 3 tablets (75 mg total) by mouth daily Do not start before December 17, 2022     potassium chloride (Klor-Con) 10 mEq tablet   Yes No   Sig: Take 10 mEq by mouth daily   rosuvastatin (CRESTOR) 5 mg tablet   Yes No   Sig: Take 5 mg by mouth daily   sildenafil (REVATIO) 20 mg tablet   Yes No   Sig: Take 20 mg by mouth Three times a day      Facility-Administered Medications: None       Past Medical History:   Diagnosis Date   • CHF (congestive heart failure) ([facility] )    • Hypertension    • Sleep apnea        History reviewed  No pertinent surgical history  History reviewed  No pertinent family history  I have reviewed and agree with the history as documented  E-Cigarette/Vaping   • E-Cigarette Use Never User      E-Cigarette/Vaping Substances     Social History     Tobacco Use   • Smoking status: Former     Types: Cigarettes   • Smokeless tobacco: Never   Vaping Use   • Vaping Use: Never used   Substance Use Topics   • Alcohol use: Not Currently   • Drug use: Not Currently       Review of Systems   Constitutional: Negative for chills and fever  HENT: Negative for drooling and voice change  Eyes: Negative for discharge and redness  Respiratory: Negative for shortness of breath and stridor  Cardiovascular: Negative for chest pain and leg swelling  Gastrointestinal: Negative for abdominal pain and vomiting  Musculoskeletal: Negative for neck pain and neck stiffness  Skin: Negative for color change and rash  Neurological: Negative for seizures and syncope  Psychiatric/Behavioral: Negative for confusion  The patient is not nervous/anxious  All other systems reviewed and are negative  Physical Exam  Physical Exam  Vitals and nursing note reviewed  Constitutional:       General: He is not in acute distress  Appearance: He is well-developed  He is not diaphoretic  HENT:      Head: Normocephalic and atraumatic  Right Ear: External ear normal       Left Ear: External ear normal    Eyes:      General: No scleral icterus  Right eye: No discharge  Left eye: No discharge  Conjunctiva/sclera: Conjunctivae normal    Cardiovascular:      Rate and Rhythm: Tachycardia present   Rhythm irregularly irregular  Heart sounds: Normal heart sounds  No murmur heard  Pulmonary:      Effort: Pulmonary effort is normal  No respiratory distress  Breath sounds: Normal breath sounds  No stridor  No wheezing or rales  Musculoskeletal:         General: No deformity  Normal range of motion  Cervical back: Normal range of motion and neck supple  Skin:     General: Skin is warm and dry  Neurological:      Mental Status: He is alert  He is not disoriented  GCS: GCS eye subscore is 4  GCS verbal subscore is 5  GCS motor subscore is 6     Psychiatric:         Behavior: Behavior normal          Vital Signs  ED Triage Vitals [01/11/23 1142]   Temperature Pulse Respirations Blood Pressure SpO2   (!) 97 1 °F (36 2 °C) (!) 138 18 124/75 94 %      Temp Source Heart Rate Source Patient Position - Orthostatic VS BP Location FiO2 (%)   Temporal Monitor Sitting Left arm --      Pain Score       --           Vitals:    01/11/23 1414 01/11/23 1417 01/11/23 1440 01/11/23 1511   BP: (!) 85/65 90/60 100/71 110/70   Pulse: 103 (!) 126 (!) 114 (!) 109   Patient Position - Orthostatic VS:             Visual Acuity      ED Medications  Medications   metoprolol (LOPRESSOR) injection 5 mg (5 mg Intravenous Given 1/11/23 1312)   metoprolol tartrate (LOPRESSOR) tablet 50 mg (50 mg Oral Given 1/11/23 1419)   sodium chloride 0 9 % bolus 250 mL (0 mL Intravenous Stopped 1/11/23 1511)       Diagnostic Studies  Results Reviewed     Procedure Component Value Units Date/Time    HS Troponin I 2hr [[id number]]  (Normal) Collected: 01/11/23 1339    Lab Status: Final result Specimen: Blood from Arm, Left Updated: 01/11/23 1418     hs TnI 2hr 8 ng/L      Delta 2hr hsTnI 1 ng/L     HS Troponin 0hr (reflex protocol) [[id number]]  (Normal) Collected: 01/11/23 1144    Lab Status: Final result Specimen: Blood from Arm, Right Updated: 01/11/23 1218     hs TnI 0hr 7 ng/L     Comprehensive metabolic panel [[id number]]  (Abnormal) Collected: 01/11/23 1144    Lab Status: Final result Specimen: Blood from Arm, Right Updated: 01/11/23 1209     Sodium 139 mmol/L      Potassium 4 6 mmol/L      Chloride 102 mmol/L      CO2 28 mmol/L      ANION GAP 9 mmol/L      BUN 43 mg/dL      Creatinine 1 40 mg/dL      Glucose 134 mg/dL      Calcium 9 5 mg/dL      AST 40 U/L      ALT 42 U/L      Alkaline Phosphatase 70 U/L      Total Protein 7 8 g/dL      Albumin 3 7 g/dL      Total Bilirubin 0 77 mg/dL      eGFR 57 ml/min/1 73sq m     Narrative:      [facility] guidelines for Chronic Kidney Disease (CKD):   •  Stage 1 with normal or high GFR (GFR > 90 mL/min/1 73 square meters)  •  Stage 2 Mild CKD (GFR = 60-89 mL/min/1 73 square meters)  •  Stage 3A Moderate CKD (GFR = 45-59 mL/min/1 73 square meters)  •  Stage 3B Moderate CKD (GFR = 30-44 mL/min/1 73 square meters)  •  Stage 4 Severe CKD (GFR = 15-29 mL/min/1 73 square meters)  •  Stage 5 End Stage CKD (GFR <15 mL/min/1 73 square meters)  Note: GFR calculation is accurate only with a steady state creatinine    Protime-INR [[id number]]  (Normal) Collected: 01/11/23 1144    Lab Status: Final result Specimen: Blood from Arm, Right Updated: 01/11/23 1203     Protime 14 1 seconds      INR 1 11    APTT [[id number]]  (Normal) Collected: 01/11/23 1144    Lab Status: Final result Specimen: Blood from Arm, Right Updated: 01/11/23 1203     PTT 28 seconds     CBC and differential [[id number]]  (Abnormal) Collected: 01/11/23 1144    Lab Status: Final result Specimen: Blood from Arm, Right Updated: 01/11/23 1148     WBC 8 84 Thousand/uL      RBC 6 47 Million/uL      Hemoglobin 15 9 g/dL      Hematocrit 51 9 %      MCV 80 fL      MCH 24 6 pg      MCHC 30 6 g/dL      RDW 16 0 %      MPV 10 2 fL      Platelets 446 Thousands/uL      nRBC 0 /100 WBCs      Neutrophils Relative 61 %      Immat GRANS % 0 %      Lymphocytes Relative 26 %      Monocytes Relative 11 %      Eosinophils Relative 2 %      Basophils Relative 0 %      Neutrophils Absolute 5 46 Thousands/µL      Immature Grans Absolute 0 02 Thousand/uL      Lymphocytes Absolute 2 26 Thousands/µL      Monocytes Absolute 0 95 Thousand/µL      Eosinophils Absolute 0 13 Thousand/µL      Basophils Absolute 0 02 Thousands/µL                  XR chest 1 view portable   Final Result by Irma Almanzar MD (01/11 4251)      Cardiomegaly      Slight vascular congestion                  Workstation performed: [id number]                    Procedures  ECG 12 Lead Documentation Only    Date/Time: 1/11/2023 5:51 PM  Performed by: Christiano Kent PA-C  Authorized by: Christiano Kent PA-C     Indications / Diagnosis:  Abnormal outpatient EKG  ECG reviewed by me, the ED Provider: yes    Patient location:  ED  Rate:     ECG rate:  133    ECG rate assessment: tachycardic    Rhythm:     Rhythm: atrial fibrillation    Ectopy:     Ectopy: none    Conduction:     Conduction: abnormal      Abnormal conduction: bifascicular block    ST segments:     ST segments:  Normal  T waves:     T waves: non-specific               ED Course                     Medical Decision Making  52yoM with a history of afib and CHF presenting for an abnormal outpatient EKG  Currently asymptomatic  No CP, SOB, or palpitations  Patient tachycardic in the 130s on arrival  Remainder of vitals stable  Initial ED plan: EKG from triage reviewed  EKG shows afib with RVR  No obvious ischemic changes present  Check cardiac labs and CXR  IV Lopressor and reassess  Final assessment: Creatinine mildly elevated at 1 4, up from baseline of 1 1  Remainder of labs unremarkable  Initial troponin 7  Heart rate improved after IV Lopressor  He did have a transient drop of blood pressure after medication and he received a 250cc IV fluid bolus with resolution of the hypotension  Heart rate <110 after IV Lopressor and he remains asymptomatic  Repeat troponin unchanged  No indication for admission at this time   Dose of PO metoprolol given  Advised close f/u with cardiology and PCP  He was advised to return to the ED with any chest pain, dyspnea, syncope  Patient expressed understanding and is agreeable to plan  Patient discharged in stable condition  Atrial fibrillation with RVR ([facility] ): acute illness or injury  Amount and/or Complexity of Data Reviewed  Labs: ordered  Decision-making details documented in ED Course  Radiology: ordered  Decision-making details documented in ED Course  ECG/medicine tests: ordered and independent interpretation performed  Decision-making details documented in ED Course  Risk  Prescription drug management  Disposition  Final diagnoses:   Atrial fibrillation with RVR ([facility] )     Time reflects when diagnosis was documented in both MDM as applicable and the Disposition within this note     Time User Action Codes Description Comment    1/11/2023  3:15  [facility] [I48 [id number]] Atrial fibrillation with RVR [facility])       ED Disposition     ED Disposition   Discharge    Condition   Stable    Date/Time   Wed Jan 11, 2023  3:15 PM    Comment   Candy Moe discharge to home/self care                 Follow-up Information     Follow up With Specialties Details Why Contact Info Additional [facility]iology Schedule an appointment as soon as possible for a visit   Jeff [address]  Ap [address] [facility],[address] [place], [place] [address]sari 46 Cardiology [address] [facility], Jeff [address], [address] [facility], [address] [address] Emergency Department Emergency Medicine  If symptoms worsen [address] Emergency Department, [address], [address] [place] [address], [address]          Discharge Medication List as of 1/11/2023  3:17 PM      CONTINUE these medications which have NOT CHANGED    Details   apixaban (ELIQUIS) 5 mg Take 1 tablet (5 mg total) by mouth 2 (two) times a day, Starting Fri 12/16/2022, Until Sun 1/15/2023, Normal      bumetanide (BUMEX) 1 mg tablet Take 1 tablet (1 mg total) by mouth 2 (two) times a day, Starting Tue 12/20/2022, Until Thu 1/19/2023, Normal      famotidine (PEPCID) 40 MG tablet Take 40 mg by mouth, Starting Mon 8/22/2022, Historical Med      Magnesium Oxide 400 MG CAPS Take one table once daily for five days, Normal      metoprolol succinate (TOPROL-XL) 25 mg 24 hr tablet Take 3 tablets (75 mg total) by mouth daily Do not start before December 17, 2022 , Starting Sat 12/17/2022, Until Mon 1/16/2023, Normal      potassium chloride (Klor-Con) 10 mEq tablet Take 10 mEq by mouth daily, Starting Mon 8/22/2022, Until Tue 8/22/2023, Historical Med      rosuvastatin (CRESTOR) 5 mg tablet Take 5 mg by mouth daily, Starting Wed 8/24/2022, Until Thu 8/24/2023, Historical Med      sildenafil (REVATIO) 20 mg tablet Take 20 mg by mouth Three times a day, Starting Tue 8/30/2022, Until Wed 8/30/2023, Historical Med             No discharge procedures on file      PDMP Review     None          ED Provider  Electronically Signed by           Saw Damon PA-C  01/11/23 2041

## 2023-01-11 NOTE — ED NOTES
[facility] gave instruction to give PO Metoprolol despite systolic BP under 436       Martha Walker RN  01/11/23 1272
Provider at bedside       500 Enrike Ferrell RN  01/11/23 5201
no discrete location documentation necessary

## 2023-01-24 ENCOUNTER — OFFICE VISIT (OUTPATIENT)
Dept: CARDIOLOGY CLINIC | Facility: CLINIC | Age: 53
End: 2023-01-24

## 2023-01-24 VITALS
RESPIRATION RATE: 16 BRPM | OXYGEN SATURATION: 96 % | SYSTOLIC BLOOD PRESSURE: 102 MMHG | HEIGHT: 71 IN | HEART RATE: 106 BPM | WEIGHT: 315 LBS | DIASTOLIC BLOOD PRESSURE: 78 MMHG | BODY MASS INDEX: 44.1 KG/M2

## 2023-01-24 DIAGNOSIS — I50.812 CHRONIC RIGHT-SIDED CONGESTIVE HEART FAILURE (HCC): Primary | ICD-10-CM

## 2023-01-24 DIAGNOSIS — I48.91 A-FIB (HCC): ICD-10-CM

## 2023-01-24 RX ORDER — METOPROLOL SUCCINATE 50 MG/1
50 TABLET, EXTENDED RELEASE ORAL DAILY
Qty: 30 TABLET | Refills: 0
Start: 2023-01-24 | End: 2023-02-23

## 2023-01-24 NOTE — PROGRESS NOTES
[facility]  [address]   [facility] [place] [address]  Cardiology Office Note    Elizabeth Baer 46 y o  male MRN: [id number]    01/24/23          Assessment/Plan:  1  Atrial fibrillation  · Rate reasonably controlled at this time, patient denies symptoms  · Continue Toprol-XL 50 mg daily  Patient notes dizziness when he was on 75 mg daily  · Restart Eliquis 5 mg, review affordability options, may need to transition to coumadin     2  RV failure  · Patient appears euvolemic  · Continue Bumex 2 mg as needed  · Repeat BMP to reassess renal function  · Follows with advanced heart failure team Dr Nabila Molina  3   Pulmonary hypertension on sildenafil    4  BISHNU  · Patient will be getting CPAP machine early next week  5   Hypertension  · Blood pressure mildly soft at today's visit  · Patient denies any dizziness/lightheadedness  · Continue Toprol-XL 50 mg daily  Follow up: 3 months or sooner as needed    Recommend aggressive risk factor modification and therapeutic lifestyle changes  Low-salt, low-calorie, low-fat, low-cholesterol diet with regular exercise and to optimize weight  Discussed concepts of atherosclerosis, including signs and symptoms of cardiac disease  Previous studies were reviewed  Safety measures were reviewed  All questions and concerns addressed  Patient was advised to report any problems requiring medical attention  1  Chronic right-sided congestive heart failure (HCC)  Basic metabolic panel      2  A-fib (HCC)  metoprolol succinate (TOPROL-XL) 50 mg 24 hr tablet          HPI: Elizabeth Baer is a 46y o  year old male with PMH of chronic atrial fibrillation, pulmonary hypertension, RV failure, BISHNU, hypertension who presents for hospital follow-up  Patient was evaluated at family doctor for dizziness since starting lisinopril/hctz  Patient had an ECG which showed atrial fibrillation with RVR  Patient was referred to the ED for further management  Patient received IV lopressor and rates were improved  Patient states that since event, he overall feels good  He denies any lightheadedness, chest pain, shortness of breath, or lower extremity edema  He notes that he has not been taking Eliquis since he ran out and his insurance doesn't cover it any longer  Discussed role of AC in the setting of atrial fibrillation  Patient was recently evaluated for BISHNU and will be getting his new CPAP equipment early next week  Patient was noted to have elevated BUN and creatinine on most recent lab work  Since then, he has been taking Bumex 2 mg PRN with weight gain  He was previously taking 2 mg bumex daily  He notes that he usually requires Bumex once weekly  Patient has RV failure and has appointment with Dr Elizabeth Mata tomorrow  He is on sildenafil for pulmonary hypertension  The patient denies chest pain, dyspnea on exertion or rest, lower extremity edema, or palpitations and was instructed to call  the office or seek medical attention if any such symptoms develop  All medications reviewed and patient is tolerating medications without side effects  Social history:   Patient denies any tobacco, significant alcohol, or recreational drug use      EKG-1/13/2023 atrial fibrillation with rapid ventricular response, RBBB, left posterior fascicular block        Patient Active Problem List   Diagnosis   • CHF (congestive heart failure) ([facility] )   • COPD mixed type ([facility] )   • Hypertension, essential   • Impaired fasting glucose   • Left ventricular hypertrophy   • Mixed hyperlipidemia   • Morbid obesity ([facility] )   • Obstructive sleep apnea   • Noncompliance with CPAP treatment   • Pul[facility] ([facility] )   • Acute respiratory failure due to COVID-19 (HCC)   • Elevated serum creatinine       No Known Allergies      Current Outpatient Medications:   •  famotidine (PEPCID) 40 MG tablet, Take 40 mg by mouth, Disp: , Rfl:   •  Magnesium Oxide 400 MG CAPS, Take one table once daily for five days, Disp: 5 tablet, Rfl: 0  •  metoprolol succinate (TOPROL-XL) 50 mg 24 hr tablet, Take 1 tablet (50 mg total) by mouth daily, Disp: 30 tablet, Rfl: 0  •  potassium chloride (Klor-Con) 10 mEq tablet, Take 10 mEq by mouth daily, Disp: , Rfl:   •  rosuvastatin (CRESTOR) 5 mg tablet, Take 5 mg by mouth daily, Disp: , Rfl:   •  sildenafil (REVATIO) 20 mg tablet, Take 20 mg by mouth Three times a day, Disp: , Rfl:   •  apixaban (ELIQUIS) 5 mg, Take 1 tablet (5 mg total) by mouth 2 (two) times a day (Patient not taking: Reported on 1/24/2023), Disp: 60 tablet, Rfl: 0  •  bumetanide (BUMEX) 1 mg tablet, Take 1 tablet (1 mg total) by mouth 2 (two) times a day, Disp: 60 tablet, Rfl: 2    Past Medical History:   Diagnosis Date   • CHF (congestive heart failure) ([facility] )    • Hypertension    • Sleep apnea        History reviewed  No pertinent family history  History reviewed  No pertinent surgical history  Social History     Socioeconomic History   • Marital status: /Civil Union     Spouse name: Not on file   • Number of children: Not on file   • Years of education: Not on file   • Highest education level: Not on file   Occupational History   • Not on file   Tobacco Use   • Smoking status: Former     Types: Cigarettes   • Smokeless tobacco: Never   Vaping Use   • Vaping Use: Never used   Substance and Sexual Activity   • Alcohol use: Not Currently   • Drug use: Not Currently   • Sexual activity: Not on file   Other Topics Concern   • Not on file   Social History Narrative   • Not on file     Social Determinants of Health     Financial Resource Strain: Not on file   Food Insecurity: No Food Insecurity   • Worried About Running Out of Food in the Last Year: Never true   • Ran Out of Food in the Last Year: Never true   Transportation Needs: No Transportation Needs   • Lack of Transportation (Medical): No   • Lack of Transportation (Non-Medical):  No   Physical Activity: Not on file   Stress: Not on file Social Connections: Not on file   Intimate Partner Violence: Not on file   Housing Stability: Unknown   • Unable to Pay for Housing in the Last Year: No   • Number of Places Lived in the Last Year: Not on file   • Unstable Housing in the Last Year: No       Review of symptoms:   Review of Systems   Constitutional: Negative for chills, diaphoresis and fever  Respiratory: Negative for cough, chest tightness and shortness of breath  Cardiovascular: Negative for chest pain, palpitations and leg swelling  Gastrointestinal: Negative for abdominal distention, blood in stool, nausea and vomiting  Genitourinary: Negative for difficulty urinating  Musculoskeletal: Negative for arthralgias and back pain  Neurological: Negative for dizziness, syncope, light-headedness and headaches  Psychiatric/Behavioral: Negative for agitation and confusion  The patient is not nervous/anxious  Vitals: /78 (BP Location: Left arm, Patient Position: Sitting, Cuff Size: Large)   Pulse (!) 106   Resp 16   Ht 5' 11" (1 803 m)   Wt (!) 156 kg (343 lb)   SpO2 96%   BMI 47 84 kg/m²         Physical Exam:     Physical Exam  Vitals and nursing note reviewed  Constitutional:       General: He is not in acute distress  Appearance: He is well-developed  He is obese  HENT:      Head: Normocephalic and atraumatic  Eyes:      Conjunctiva/sclera: Conjunctivae normal    Cardiovascular:      Rate and Rhythm: Normal rate and regular rhythm  Heart sounds: Normal heart sounds  No murmur heard  Pulmonary:      Effort: Pulmonary effort is normal  No respiratory distress  Breath sounds: Normal breath sounds  Abdominal:      Palpations: Abdomen is soft  Tenderness: There is no abdominal tenderness  Musculoskeletal:         General: No swelling  Cervical back: Neck supple  Right lower leg: No edema  Left lower leg: No edema  Skin:     General: Skin is warm and dry        Capillary Refill: Capillary refill takes less than 2 seconds  Neurological:      Mental Status: He is alert and oriented to person, place, and time  Psychiatric:         Mood and Affect: Mood normal             Thank you for allowing me to participate in the care and evaluation of your patient  Should you have any questions, please feel free to contact me

## 2023-01-26 ENCOUNTER — OFFICE VISIT (OUTPATIENT)
Dept: CARDIOLOGY CLINIC | Facility: CLINIC | Age: 53
End: 2023-01-26

## 2023-01-26 VITALS
BODY MASS INDEX: 44.1 KG/M2 | DIASTOLIC BLOOD PRESSURE: 68 MMHG | SYSTOLIC BLOOD PRESSURE: 122 MMHG | WEIGHT: 315 LBS | RESPIRATION RATE: 21 BRPM | OXYGEN SATURATION: 98 % | HEART RATE: 60 BPM | HEIGHT: 71 IN

## 2023-01-26 DIAGNOSIS — I51.7 ENLARGED RV (RIGHT VENTRICLE): ICD-10-CM

## 2023-01-26 DIAGNOSIS — I48.0 PAROXYSMAL ATRIAL FIBRILLATION (HCC): ICD-10-CM

## 2023-01-26 DIAGNOSIS — E78.2 MIXED HYPERLIPIDEMIA: ICD-10-CM

## 2023-01-26 DIAGNOSIS — G47.33 OSA (OBSTRUCTIVE SLEEP APNEA): ICD-10-CM

## 2023-01-26 DIAGNOSIS — I27.20 PULMONARY HYPERTENSION (HCC): Primary | ICD-10-CM

## 2023-01-26 RX ORDER — SILDENAFIL CITRATE 20 MG/1
20 TABLET ORAL 3 TIMES DAILY
Qty: 90 TABLET | Refills: 3 | Status: SHIPPED | OUTPATIENT
Start: 2023-01-26 | End: 2024-01-26

## 2023-01-26 NOTE — PATIENT INSTRUCTIONS
Continue current medications  We will repeat echocardiogram in 3 months and reassess pulmonary hypertension  2g sodium diet  Daily weights, take bumex 1mg with potassium 10 meq as needed for weight gain of 3 lbs in a day or 5 lbs in 5-7 days

## 2023-01-26 NOTE — PROGRESS NOTES
Advanced Heart Failure Outpatient Consult Note - Leah Mckenzie 46 y o  male MRN: [id number]    Encounter: [id number]      Assessment/Plan:    Patient Active Problem List    Diagnosis Date Noted   • CHF (congestive heart failure) ([place] [address] ) 12/01/2022   • Acute respiratory failure due to COVID-19 ([place] [address] ) 12/01/2022   • Elevated serum creatinine 12/01/2022   • COPD mixed type ([place] [address] ) 11/07/2022   • Obstructive sleep apnea 12/22/2021   • Left ventricular hypertrophy 12/20/2021   • Pulmonary hypertension ([place] [address] ) 12/07/2021   • Impaired fasting glucose 06/24/2020   • Hypertension, essential 08/20/2017   • Mixed hyperlipidemia 08/20/2017   • Noncompliance with CPAP treatment 03/10/2014   • Morbid obesity ([place] [address] ) 02/05/2013     Pulmonary hypertension, NYHA II  RV dilated with preserved systolic function  Etiology: likely group III disease with history of chronic hypoxia and sleep apnea  Recently with COVID pneumonia likely contributing  Reportedly maintained on sildenafil 20mg TID but patient does not recall taking this before hospitalization 12/2022   Last echo prior to hospitalization (12/2021 [facility]) reported normal RV size and systolic function and no pulmonary hypertension  Euvolemic on exam, warm and well perfused   PH Rx: sildenafil 20mg TID  Diuretic: bumex 1mg with potassium 10 meq taking only as needed for weight gain    Weight: 342 lbs 1/25/23  NT proBNP: 527 12/9/22    Studies- personally reviewed by me    Echocardiogram 12/15/22, limited:  LVEF: normal size and systolic function  RV severely dilated with normal systolic function, TAPSE 2 1  Poor TR envelope for estimation of PASP  RVOT, normal doppler, parabolic, no notching  IVC normal size and respirophasic variation    Echo limited 12/2/22:  LVEF normal  RV severely dilated, mildly reduced systolic function  Estimated PASP 51 assuming RAP of 10, IVC not well visualized    CTA Chest 12/1/22:  Enlarged main PA, 4 6cm  Subsegmental atelectasis versus pneumonia right lower lobe   No evidence of pulmonary embolism given limitations  [facility] studies:  Spirometry 11/4/22: "Moderate degree of restrictive lung disease noted suggesting cardiac   and/or chest wall and/or pulmonary parenchyma as the etiology       No obstructive airways disease noted  No evidence of good bronchodilator   response noted  Lung volumes are suggestive of restrictive pattern  Diffusion is Moderately reduced indicating loss of alveolar-capillary   units and/or VQ mismatch  "   6MWT 11/4/22: "Based on six minute walk testing, patient qualifies for oxygen at 6 L/min "   Chest CT 12/26/21: No evidence of ILD  Echo 12/18/21: LVEF 60-65%  Normal RV size and systolic function  No significant valve disease    # Chronic hypoxic respiratory failure, did not qualify for home oxygen on discharge 12/2022  # H/o pneumonia/COVID, s/p IV Decadron remdesivir and baricitinib 12/2022  # Atrial fibrillation, paroxysmal  Rate: metoprolol succinate 50mg daily  AC: apixaban 5mg BID  # Severe BISHNU, nonadherent to CPAP  # Hypertension  # Mixed hyperlipidemia  Rx: rosuvastatin 5mg daily    Today's Plan:  Plan on repeat echocardiogram with treatment of BISHNU in 3 months to reassess pulmonary hypertension  Continue sildefanil for now, possible weaning pending findings on repeat echo  Use CPAP as prescribed  2g sodium diet  Daily weights, take bumex 1mg with potassium 10 meq as needed for weight gain of 3 lbs in a day or 5 lbs in 5-7 days      HPI:   46year old male with PMH of hypertension, sleep apnea, COPD, morbid obesity, CHF and pulmonary hypertension who was admitted 12/1/22 for progressive shortness of breath for a few months with worsening over few days  He tested positive for COVID  Admitted for acute hypoxic respiratory failure due to COVID/CHF exacerbation  CT PE showed right lower lobe pneumonia  S/p IV Decadron remdesivir and baricitinib  He was diuresed and diuresed with IV lasix then transitioned to bumex 2mg BID   He was continued on home sildenafil at 20mg TID  Seen at the ED 1/11/23 for afib with RVR  Given IV lopressor with improved rate control  Also noted to have LINDEN and bumex dose decreased  Patient reports he does not recall being on sildenafil prior to hospitalization 12/2022 at that the medication was only started while he was in the hospital  He reports taking bumex and potassium only as needed for weight gain  Denies PND, orthopnea or lower extremity edema  No chest pain or palpitations  He is able to walk about 1 mile and 1 flight of stairs without symptoms  No lightheadedness, syncope or near syncope  Past Medical History:   Diagnosis Date   • CHF (congestive heart failure) ([facility])    • Hypertension    • Sleep apnea        Review of Systems   Constitutional: Negative for chills, fatigue and fever  HENT: Negative for ear pain and sore throat  Eyes: Negative for pain and visual disturbance  Respiratory: Negative for cough, chest tightness and shortness of breath  Cardiovascular: Negative for chest pain, palpitations and leg swelling  Gastrointestinal: Negative for abdominal distention, abdominal pain and vomiting  Endocrine: Negative  Genitourinary: Negative for dysuria and hematuria  Musculoskeletal: Negative for arthralgias and back pain  Skin: Negative for color change and rash  Allergic/Immunologic: Negative  Neurological: Negative for dizziness, seizures, syncope and light-headedness  Hematological: Negative  Psychiatric/Behavioral: Negative  All other systems reviewed and are negative  No Known Allergies        Current Outpatient Medications:   •  apixaban (ELIQUIS) 5 mg, Take 1 tablet (5 mg total) by mouth 2 (two) times a day, Disp: 60 tablet, Rfl: 3  •  bumetanide (BUMEX) 1 mg tablet, Take 1 tablet (1 mg total) by mouth 2 (two) times a day, Disp: 60 tablet, Rfl: 2  •  famotidine (PEPCID) 40 MG tablet, Take 40 mg by mouth, Disp: , Rfl:   •  Magnesium Oxide 400 MG CAPS, Take one table once daily for five days, Disp: 5 tablet, Rfl: 0  •  metoprolol succinate (TOPROL-XL) 50 mg 24 hr tablet, Take 1 tablet (50 mg total) by mouth daily, Disp: 30 tablet, Rfl: 0  •  rosuvastatin (CRESTOR) 5 mg tablet, Take 5 mg by mouth daily, Disp: , Rfl:   •  sildenafil (REVATIO) 20 mg tablet, Take 1 tablet (20 mg total) by mouth 3 (three) times a day, Disp: 90 tablet, Rfl: 3  •  potassium chloride (Klor-Con) 10 mEq tablet, Take 10 mEq by mouth daily (Patient not taking: Reported on 1/26/2023), Disp: , Rfl:     Social History     Socioeconomic History   • Marital status: /Civil Union     Spouse name: Not on file   • Number of children: Not on file   • Years of education: Not on file   • Highest education level: Not on file   Occupational History   • Not on file   Tobacco Use   • Smoking status: Former     Types: Cigarettes   • Smokeless tobacco: Never   Vaping Use   • Vaping Use: Never used   Substance and Sexual Activity   • Alcohol use: Not Currently   • Drug use: Not Currently   • Sexual activity: Not on file   Other Topics Concern   • Not on file   Social History Narrative   • Not on file     Social Determinants of Health     Financial Resource Strain: Not on file   Food Insecurity: No Food Insecurity   • Worried About Running Out of Food in the Last Year: Never true   • Ran Out of Food in the Last Year: Never true   Transportation Needs: No Transportation Needs   • Lack of Transportation (Medical): No   • Lack of Transportation (Non-Medical): No   Physical Activity: Not on file   Stress: Not on file   Social Connections: Not on file   Intimate Partner Violence: Not on file   Housing Stability: Unknown   • Unable to Pay for Housing in the Last Year: No   • Number of Places Lived in the Last Year: Not on file   • Unstable Housing in the Last Year: No       History reviewed  No pertinent family history  Physical Exam:    Vitals: Blood pressure 122/68, pulse 60, resp   rate 21, height 5' 11" (1 803 m), weight (!) 155 kg (342 lb), SpO2 98 %  , Body mass index is 47 7 kg/m² ,   Wt Readings from Last 3 Encounters:   01/26/23 (!) 155 kg (342 lb)   01/24/23 (!) 156 kg (343 lb)   12/20/22 (!) 149 kg (328 lb)       Physical Exam  Constitutional:       General: He is not in acute distress  Appearance: Normal appearance  HENT:      Head: Normocephalic and atraumatic  Mouth/Throat:      Mouth: Mucous membranes are moist    Eyes:      General: No scleral icterus  Extraocular Movements: Extraocular movements intact  Cardiovascular:      Rate and Rhythm: Normal rate and regular rhythm  Pulses: Normal pulses  Heart sounds: S1 normal and S2 normal  No murmur heard  No friction rub  No gallop  Comments: Nonelevated JVP  Pulmonary:      Effort: Pulmonary effort is normal       Breath sounds: Normal breath sounds  Abdominal:      General: There is no distension  Palpations: Abdomen is soft  Tenderness: There is no abdominal tenderness  There is no guarding or rebound  Musculoskeletal:         General: Normal range of motion  Cervical back: Neck supple  Right lower leg: No edema  Left lower leg: No edema  Skin:     General: Skin is warm and dry  Capillary Refill: Capillary refill takes less than 2 seconds  Neurological:      General: No focal deficit present  Mental Status: He is alert and oriented to person, place, and time     Psychiatric:         Mood and Affect: Mood normal          Labs & Results:    Lab Results   Component Value Date    WBC 8 84 01/11/2023    HGB 15 9 01/11/2023    HCT 51 9 (H) 01/11/2023    MCV 80 (L) 01/11/2023     01/11/2023     Lab Results   Component Value Date    SODIUM 139 01/11/2023    K 4 6 01/11/2023     01/11/2023    CO2 28 01/11/2023    BUN 43 (H) 01/11/2023    CREATININE 1 40 (H) 01/11/2023    GLUC 134 01/11/2023    CALCIUM 9 5 01/11/2023     Lab Results   Component Value Date    NTBNP 527 (H) 12/09/2022      No results found for: CHOLESTEROL  No results found for: HDL  No results found for: TRIG  No results found for: Deepthi    EKG personally reviewed by Pritesh Thompson MD      Counseling / Coordination of Care  Time spent today 40 minutes  Greater than 50% of total time was spent with the patient and / or family counseling and / or coordination of care  We discussed diagnoses, most recent studies and any changes in treatment    Thank you for the opportunity to participate in the care of this patient      Chantelle Cadena MD  ADVANCED HEART FAILURE AND MECHANICAL CIRCULATORY SUPPORT  [facility]

## 2023-02-01 ENCOUNTER — TELEPHONE (OUTPATIENT)
Dept: CARDIOLOGY CLINIC | Facility: CLINIC | Age: 53
End: 2023-02-01

## 2023-02-01 NOTE — TELEPHONE ENCOUNTER
----- Message from [place], [place] sent at 1/24/2023  4:39 PM EST -----  Regarding: Possible Coumadin start  Jan Gould,   I saw this patient today in the office  He had stopped taking Eliquis due to affordability  He was given 2 weeks of Eliquis samples at today's visit  He may need to be transitioned to Coumadin for anticoagulation instead      Thank you,     Katherine Villalobos

## 2023-02-01 NOTE — TELEPHONE ENCOUNTER
I called the pt to instruct him how to transition to Warfarin  He states he has about a month and a half left of Eliquis and is not ready to go on Warfarin just yet    He said he has an appointment with Dr Clementina Figueroa on 3/8 and he will further discuss this transition with him first

## 2023-03-08 ENCOUNTER — OFFICE VISIT (OUTPATIENT)
Dept: CARDIOLOGY CLINIC | Facility: CLINIC | Age: 53
End: 2023-03-08

## 2023-03-08 VITALS
SYSTOLIC BLOOD PRESSURE: 108 MMHG | HEIGHT: 71 IN | DIASTOLIC BLOOD PRESSURE: 70 MMHG | OXYGEN SATURATION: 98 % | WEIGHT: 315 LBS | HEART RATE: 72 BPM | RESPIRATION RATE: 16 BRPM | BODY MASS INDEX: 44.1 KG/M2

## 2023-03-08 DIAGNOSIS — I27.20 PULMONARY HYPERTENSION (HCC): Primary | ICD-10-CM

## 2023-03-08 DIAGNOSIS — E78.2 MIXED HYPERLIPIDEMIA: ICD-10-CM

## 2023-03-08 RX ORDER — LISINOPRIL AND HYDROCHLOROTHIAZIDE 12.5; 1 MG/1; MG/1
TABLET ORAL
COMMUNITY
Start: 2023-01-05

## 2023-03-08 NOTE — PROGRESS NOTES
Cardiology Follow Up    Madelin Harmon  1970  [id number]  [address]  [place] [address] [phone]    Discussion/Summary:  1  RV failure  2  Atrial fibrillation on anticoagulation  3  Pulmonary hypertension on sildenafuil  4  Severe BISHNU - not compliant with CPAP  Making arrangement to get a CPAP  5  Hypertension    Follows with CHF/Pulmonary hypertension clinic  No complaints  Euvolemic  On bumex  On sildenafil  Plan for repeat echo in a couple months  Afib:  On AC  States he will stay on apixaban  Does not want to transition to coumadin due to need for frequent blood work  On rate control with metoprolol    BISHNU:  Compliant with CPAP    Dyslipidemia:  Cont with statin    Patient to follow with Dr David Alexander for RV failure/Pulm hypertension    Previous studies were reviewed  Safety measures were reviewed  Questions were entertained and answered  Patient was advised to report any problems requiring medical attention  Follow-up with PCP and appropriate specialist and lab work as discussed  Return for follow up visit as scheduled or earlier, if needed  Thank you for allowing me to participate in the care and evaluation of your patient  Should you have any questions, please feel free to contact me  History of Present Illness:     Madelin Harmon is a 46 y o  male who presents for follow up for cardiovascular care  Denies chest pain, chest pressure, shortness of breath, dizziness, lightheadedness, presyncope or syncope  Denies orthopnea, PND or lower limb edema            Patient Active Problem List   Diagnosis   • CHF (congestive heart failure) ([facility] )   • COPD mixed type ([facility] )   • Hypertension, essential   • Impaired fasting glucose   • Left ventricular hypertrophy   • Mixed hyperlipidemia   • Morbid obesity ([facility] )   • Obstructive sleep apnea   • Noncompliance with CPAP treatment   • Pulmonary hypertension ([facility])   • Acute respiratory failure due to COVID-19 ([facility] )   • Elevated serum creatinine     Past Medical History:   Diagnosis Date   • CHF (congestive heart failure) ([facility] )    • Hypertension    • Sleep apnea      Social History     Socioeconomic History   • Marital status: /Civil Union     Spouse name: Not on file   • Number of children: Not on file   • Years of education: Not on file   • Highest education level: Not on file   Occupational History   • Not on file   Tobacco Use   • Smoking status: Former     Types: Cigarettes   • Smokeless tobacco: Never   Vaping Use   • Vaping Use: Never used   Substance and Sexual Activity   • Alcohol use: Not Currently   • Drug use: Not Currently   • Sexual activity: Not on file   Other Topics Concern   • Not on file   Social History Narrative   • Not on file     Social Determinants of Health     Financial Resource Strain: Not on file   Food Insecurity: No Food Insecurity   • Worried About Running Out of Food in the Last Year: Never true   • Ran Out of Food in the Last Year: Never true   Transportation Needs: No Transportation Needs   • Lack of Transportation (Medical): No   • Lack of Transportation (Non-Medical): No   Physical Activity: Not on file   Stress: Not on file   Social Connections: Not on file   Intimate Partner Violence: Not on file   Housing Stability: Unknown   • Unable to Pay for Housing in the Last Year: No   • Number of Places Lived in the Last Year: Not on file   • Unstable Housing in the Last Year: No      History reviewed  No pertinent family history  History reviewed  No pertinent surgical history      Current Outpatient Medications:   •  apixaban (ELIQUIS) 5 mg, Take 1 tablet (5 mg total) by mouth 2 (two) times a day, Disp: 60 tablet, Rfl: 3  •  bumetanide (BUMEX) 1 mg tablet, Take 1 tablet (1 mg total) by mouth 2 (two) times a day, Disp: 60 tablet, Rfl: 2  •  famotidine (PEPCID) 40 MG tablet, Take 40 mg by mouth, Disp: , Rfl:   •  lisinopril-hydrochlorothiazide (PRINZIDE,ZESTORETIC) 10-12 5 MG per tablet, , Disp: , Rfl:   •  Magnesium Oxide 400 MG CAPS, Take one table once daily for five days, Disp: 5 tablet, Rfl: 0  •  metoprolol succinate (TOPROL-XL) 50 mg 24 hr tablet, Take 1 tablet (50 mg total) by mouth daily, Disp: 30 tablet, Rfl: 0  •  potassium chloride (Klor-Con) 10 mEq tablet, Take 10 mEq by mouth daily, Disp: , Rfl:   •  rosuvastatin (CRESTOR) 5 mg tablet, Take 5 mg by mouth daily, Disp: , Rfl:   •  sildenafil (REVATIO) 20 mg tablet, Take 1 tablet (20 mg total) by mouth 3 (three) times a day, Disp: 90 tablet, Rfl: 3  No Known Allergies    Labs:  Lab Results   Component Value Date    WBC 8 84 01/11/2023    HGB 15 9 01/11/2023    HCT 51 9 (H) 01/11/2023    MCV 80 (L) 01/11/2023     01/11/2023     Lab Results   Component Value Date    GLUCOSE 143 (H) 12/03/2022    CALCIUM 9 5 01/11/2023    K 4 6 01/11/2023    CO2 28 01/11/2023     01/11/2023    BUN 43 (H) 01/11/2023    CREATININE 1 40 (H) 01/11/2023     Lab Results   Component Value Date    HGBA1C 6 0 (H) 03/21/2020     No results found for: CHOL  No results found for: HDL  No results found for: LDLCALC  No results found for: TRIG  No results found for: CHOLHDL    Review of Systems:  Review of Systems   Constitutional: Negative  Negative for activity change, appetite change, fatigue and fever  Respiratory: Negative for cough, chest tightness, shortness of breath and wheezing  Cardiovascular: Negative for chest pain, palpitations and leg swelling  Gastrointestinal: Negative for nausea and vomiting  Genitourinary: Negative for difficulty urinating  Musculoskeletal: Negative for arthralgias and back pain  Neurological: Negative for dizziness, syncope, weakness and light-headedness  Hematological: Negative for adenopathy  All other systems reviewed and are negative  Physical Exam:  Physical Exam  Vitals and nursing note reviewed  Constitutional:       General: He is not in acute distress  Appearance: Normal appearance  He is not ill-appearing or diaphoretic  HENT:      Head: Normocephalic and atraumatic  Eyes:      Extraocular Movements: Extraocular movements intact  Cardiovascular:      Rate and Rhythm: Normal rate and regular rhythm  Heart sounds: No murmur heard  No friction rub  No gallop  Pulmonary:      Effort: Pulmonary effort is normal  No respiratory distress  Breath sounds: Normal breath sounds  Abdominal:      General: There is no distension  Palpations: Abdomen is soft  Musculoskeletal:         General: No swelling  Normal range of motion  Skin:     General: Skin is warm and dry  Capillary Refill: Capillary refill takes less than 2 seconds  Coloration: Skin is not pale  Neurological:      General: No focal deficit present  Mental Status: He is alert and oriented to person, place, and time  Psychiatric:         Mood and Affect: Mood normal          Thought Content:  Thought content normal

## 2023-03-27 ENCOUNTER — TELEPHONE (OUTPATIENT)
Dept: CARDIOLOGY CLINIC | Facility: CLINIC | Age: 53
End: 2023-03-27

## 2023-03-27 NOTE — TELEPHONE ENCOUNTER
----- Message from Santos Guevara MD sent at 3/27/2023  1:31 PM EDT -----  These let patient know that I reviewed her blood work and appears essentially unremarkable

## 2023-04-04 ENCOUNTER — HOSPITAL ENCOUNTER (OUTPATIENT)
Dept: NON INVASIVE DIAGNOSTICS | Facility: CLINIC | Age: 53
Discharge: HOME | End: 2023-04-04

## 2023-04-04 VITALS
HEIGHT: 71 IN | BODY MASS INDEX: 44.1 KG/M2 | WEIGHT: 315 LBS | SYSTOLIC BLOOD PRESSURE: 108 MMHG | HEART RATE: 120 BPM | DIASTOLIC BLOOD PRESSURE: 78 MMHG

## 2023-04-04 DIAGNOSIS — I27.20 PULMONARY HYPERTENSION (HCC): ICD-10-CM

## 2023-04-04 DIAGNOSIS — I48.0 PAROXYSMAL ATRIAL FIBRILLATION (HCC): ICD-10-CM

## 2023-04-04 LAB
AORTIC ROOT: 4.5 CM
APICAL FOUR CHAMBER EJECTION FRACTION: 40 %
ASCENDING AORTA: 4.1 CM
AV LVOT PEAK GRADIENT: 4 MMHG
AV PEAK GRADIENT: 5 MMHG
FRACTIONAL SHORTENING: 25 (ref 28–44)
INTERVENTRICULAR SEPTUM IN DIASTOLE (PARASTERNAL SHORT AXIS VIEW): 1.4 CM
INTERVENTRICULAR SEPTUM: 1.4 CM (ref 0.6–1.1)
LAAS-AP2: 24.8 CM2
LAAS-AP4: 24.8 CM2
LEFT ATRIUM AREA SYSTOLE SINGLE PLANE A4C: 23.8 CM2
LEFT ATRIUM SIZE: 5.4 CM
LEFT INTERNAL DIMENSION IN SYSTOLE: 3.9 CM (ref 2.1–4)
LEFT VENTRICULAR INTERNAL DIMENSION IN DIASTOLE: 5.2 CM (ref 3.5–6)
LEFT VENTRICULAR POSTERIOR WALL IN END DIASTOLE: 1.4 CM
LEFT VENTRICULAR STROKE VOLUME: 61 ML
LVSV (TEICH): 61 ML
MITRAL REGURGITATION PEAK VELOCITY: 4.89 M/S
MITRAL VALVE REGURGITANT PEAK GRADIENT: 96 MMHG
RIGHT ATRIAL 2D VOLUME: 85 ML
RIGHT ATRIUM AREA SYSTOLE A4C: 25.7 CM2
RIGHT VENTRICLE ID DIMENSION: 4.5 CM
SL CV LEFT ATRIUM LENGTH A2C: 6.5 CM
SL CV PED ECHO LEFT VENTRICLE DIASTOLIC VOLUME (MOD BIPLANE) 2D: 128 ML
SL CV PED ECHO LEFT VENTRICLE SYSTOLIC VOLUME (MOD BIPLANE) 2D: 67 ML
TR MAX PG: 31 MMHG
TR PEAK VELOCITY: 2.8 M/S
TRICUSPID VALVE PEAK REGURGITATION VELOCITY: 2.77 M/S

## 2023-04-29 ENCOUNTER — APPOINTMENT (OUTPATIENT)
Dept: LAB | Facility: CLINIC | Age: 53
End: 2023-04-29

## 2023-04-29 DIAGNOSIS — I50.812 CHRONIC RIGHT-SIDED CONGESTIVE HEART FAILURE (HCC): ICD-10-CM

## 2023-04-29 DIAGNOSIS — I27.20 PULMONARY HYPERTENSION (HCC): ICD-10-CM

## 2023-04-29 LAB
ANION GAP SERPL CALCULATED.3IONS-SCNC: 2 MMOL/L (ref 4–13)
BUN SERPL-MCNC: 26 MG/DL (ref 5–25)
CALCIUM SERPL-MCNC: 9.2 MG/DL (ref 8.3–10.1)
CHLORIDE SERPL-SCNC: 108 MMOL/L (ref 96–108)
CO2 SERPL-SCNC: 31 MMOL/L (ref 21–32)
CREAT SERPL-MCNC: 1.22 MG/DL (ref 0.6–1.3)
GFR SERPL CREATININE-BSD FRML MDRD: 67 ML/MIN/1.73SQ M
GLUCOSE P FAST SERPL-MCNC: 112 MG/DL (ref 65–99)
MAGNESIUM SERPL-MCNC: 2.5 MG/DL (ref 1.6–2.6)
POTASSIUM SERPL-SCNC: 3.9 MMOL/L (ref 3.5–5.3)
SODIUM SERPL-SCNC: 141 MMOL/L (ref 135–147)

## 2023-05-01 ENCOUNTER — TELEPHONE (OUTPATIENT)
Dept: CARDIOLOGY CLINIC | Facility: CLINIC | Age: 53
End: 2023-05-01

## 2023-05-01 NOTE — TELEPHONE ENCOUNTER
----- Message from Angel Ontiveros, [address] sent at 5/1/2023  8:29 AM EDT -----  Please let patient know that his kidney function has improved and is back to baseline  Thank you

## 2023-05-01 NOTE — TELEPHONE ENCOUNTER
----- Message from Natali Wolf, [address] sent at 5/1/2023  8:29 AM EDT -----  Please let patient know that his kidney function has improved and is back to baseline  Thank you

## 2023-05-18 ENCOUNTER — OFFICE VISIT (OUTPATIENT)
Dept: CARDIOLOGY CLINIC | Facility: CLINIC | Age: 53
End: 2023-05-18

## 2023-05-18 VITALS
HEIGHT: 71 IN | HEART RATE: 93 BPM | RESPIRATION RATE: 16 BRPM | DIASTOLIC BLOOD PRESSURE: 70 MMHG | WEIGHT: 315 LBS | SYSTOLIC BLOOD PRESSURE: 108 MMHG | BODY MASS INDEX: 44.1 KG/M2 | OXYGEN SATURATION: 98 %

## 2023-05-18 DIAGNOSIS — G47.33 OSA (OBSTRUCTIVE SLEEP APNEA): ICD-10-CM

## 2023-05-18 DIAGNOSIS — I10 HYPERTENSION, ESSENTIAL: ICD-10-CM

## 2023-05-18 DIAGNOSIS — I48.0 PAROXYSMAL ATRIAL FIBRILLATION (HCC): ICD-10-CM

## 2023-05-18 DIAGNOSIS — I27.20 PULMONARY HYPERTENSION (HCC): Primary | ICD-10-CM

## 2023-05-18 NOTE — PROGRESS NOTES
Advanced Heart Failure Outpatient Progress Note - Sumit Culver 46 y o  male MRN: [id number]    Encounter: [id number]      Assessment/Plan:    Patient Active Problem List    Diagnosis Date Noted   • CHF (congestive heart failure) ([place] [address] ) 12/01/2022   • Acute respiratory failure due to COVID-19 ([place] [address] ) 12/01/2022   • Elevated serum creatinine 12/01/2022   • COPD mixed type ([place] [address] ) 11/07/2022   • Obstructive sleep apnea 12/22/2021   • Left ventricular hypertrophy 12/20/2021   • Pulmonary hypertension ([place] [address] ) 12/07/2021   • Impaired fasting glucose 06/24/2020   • Hypertension, essential 08/20/2017   • Mixed hyperlipidemia 08/20/2017   • Noncompliance with CPAP treatment 03/10/2014   • Morbid obesity ([place] [address] ) 02/05/2013     # Pulmonary hypertension, NYHA II  RV dilated with preserved systolic function  Etiology: likely group III disease with history of chronic hypoxia and sleep apnea  Recently with COVID pneumonia likely contributing  Reportedly maintained on sildenafil 20mg TID but patient does not recall taking this before hospitalization 12/2022  Last echo prior to hospitalization (12/2021 [facility]) reported normal RV size and systolic function and no pulmonary hypertension  Repeat echocardiogram 4/4/23 showed improvement in RV size and function and reduction in estimated pulmonary artery pressure    Euvolemic tp mildly volume up on exam, warm and well perfused   PH Rx: sildenafil 20mg TID  Diuretic: bumex 4mg BID and potassium 20 meq daily    Weight: 342 lbs 1/25/23; 360lbs 4/20/23; 358 lbs 5/18/23  NT proBNP: 527 12/9/22    Studies- personally reviewed by me    Echo 4/4/23:  LVEF 40-45%, mild concentric LVH, 50-55% on review  RV moderately dilated with normal systolic function  Estimated [id number], mild TR  Ascending aorta 4 1cm, 4 5cm at the root    Echocardiogram 12/15/22, limited:  LVEF: normal size and systolic function  RV severely dilated with normal systolic function, TAPSE 2 1  Poor TR envelope for estimation of "PASP  RVOT, normal doppler, parabolic, no notching  IVC normal size and respirophasic variation    Echo limited 12/2/22:  LVEF normal  RV severely dilated, mildly reduced systolic function  Estimated PASP 51 assuming RAP of 10, IVC not well visualized    CTA Chest 12/1/22:  Enlarged main PA, 4 6cm  Subsegmental atelectasis versus pneumonia right lower lobe  No evidence of pulmonary embolism given limitations  [facility] studies:  Spirometry 11/4/22: \"Moderate degree of restrictive lung disease noted suggesting cardiac   and/or chest wall and/or pulmonary parenchyma as the etiology       No obstructive airways disease noted  No evidence of good bronchodilator   response noted  Lung volumes are suggestive of restrictive pattern  Diffusion is Moderately reduced indicating loss of alveolar-capillary   units and/or VQ mismatch   \"   6MWT 11/4/22: \"Based on six minute walk testing, patient qualifies for oxygen at 6 L/min  \"   Chest CT 12/26/21: No evidence of ILD  Echo 12/18/21: LVEF 60-65%  Normal RV size and systolic function  No significant valve disease    # Chronic hypoxic respiratory failure, did not qualify for home oxygen on discharge 12/2022  # H/o pneumonia/COVID, s/p IV Decadron remdesivir and baricitinib 12/2022  # Atrial fibrillation, paroxysmal  Rate: metoprolol succinate 50mg daily  AC: apixaban 5mg BID  # Severe BISHNU, now reports using CPAP every night  # Hypertension, controlled  # Mixed hyperlipidemia, 12/26/22 TG 85 LDL 91 HDL 64  Rx: rosuvastatin 5mg daily    Today's Plan:  Use CPAP as prescribed  Wean off sildenafil now that he is adherent to CPAP and volume status improved   Decrease sildenafil to 20mg two times a day for 1 week then discontinue  We will repeat echocardiogram in 3 months to reassess heart function and pulmonary hypertension off of sildenafil  2g sodium diet  Continue rest of cardiac medications  Daily weights      HPI:   46year old male with PMH of hypertension, sleep apnea, COPD, " morbid obesity, CHF and pulmonary hypertension who was admitted 12/1/22 for progressive shortness of breath for a few months with worsening over few days  He tested positive for COVID  Admitted for acute hypoxic respiratory failure due to COVID/CHF exacerbation  CT PE showed right lower lobe pneumonia  S/p IV Decadron remdesivir and baricitinib  He was diuresed and diuresed with IV lasix then transitioned to bumex 2mg BID  He was continued on home sildenafil at 20mg TID  Seen at the ED 1/11/23 for afib with RVR  Given IV lopressor with improved rate control  Also noted to have LIDNEN and bumex dose decreased  Patient reports he does not recall being on sildenafil prior to hospitalization 12/2022 at that the medication was only started while he was in the hospital  He reports taking bumex and potassium only as needed for weight gain  Denies PND, orthopnea or lower extremity edema  No chest pain or palpitations  He is able to walk about 1 mile and 1 flight of stairs without symptoms  No lightheadedness, syncope or near syncope  4/20/23: here for follow up  Noted increased in weight and swelling  PCP increased bumex to 4mg BID and potassium to 10 meq BID  He reports drinking about 6 L of fluid a day  Tries to limit his salt intake  No adherent with CPAP use  Repeat echocardiogram showed improvement in RV size and systolic function and reduction in pulmonary artery systolic pressure to 30 mmHg  Interval history:  5/18/23: here for follow up  4/29/23 Na 141 K 3 9 creatinine 1 22 from 1 16, Mg 2 5  He reports overall doing fine  Staying active  Still drinking a lot of fluids  Weight down about 3 lbs since last visit  Denies shortness of breath, no worsening leg swelling  Past Medical History:   Diagnosis Date   • CHF (congestive heart failure) (HCC)    • Hypertension    • Sleep apnea        Review of Systems   Constitutional: Negative for chills, fatigue, fever and unexpected weight change     HENT: Negative for ear pain and sore throat  Eyes: Negative for pain and visual disturbance  Respiratory: Negative for cough, chest tightness and shortness of breath  Cardiovascular: Positive for leg swelling  Negative for chest pain and palpitations  Gastrointestinal: Negative for abdominal distention, abdominal pain and vomiting  Endocrine: Negative  Genitourinary: Negative for dysuria and hematuria  Musculoskeletal: Negative for arthralgias and back pain  Skin: Negative for color change and rash  Allergic/Immunologic: Negative  Neurological: Negative for dizziness, seizures, syncope and light-headedness  Hematological: Negative  Psychiatric/Behavioral: Negative  All other systems reviewed and are negative  No Known Allergies        Current Outpatient Medications:   •  apixaban (ELIQUIS) 5 mg, Take 1 tablet (5 mg total) by mouth 2 (two) times a day, Disp: 60 tablet, Rfl: 5  •  bumetanide (BUMEX) 1 mg tablet, Take 4 tablets (4 mg total) by mouth 2 (two) times a day, Disp: 240 tablet, Rfl: 2  •  famotidine (PEPCID) 40 MG tablet, Take 40 mg by mouth, Disp: , Rfl:   •  lisinopril-hydrochlorothiazide (PRINZIDE,ZESTORETIC) 10-12 5 MG per tablet, , Disp: , Rfl:   •  Magnesium Oxide 400 MG CAPS, Take one table once daily for five days, Disp: 5 tablet, Rfl: 0  •  metoprolol succinate (TOPROL-XL) 50 mg 24 hr tablet, Take 1 tablet (50 mg total) by mouth daily, Disp: 30 tablet, Rfl: 0  •  potassium chloride (Klor-Con) 10 mEq tablet, Take 2 tablets (20 mEq total) by mouth daily, Disp: 120 tablet, Rfl: 11  •  rosuvastatin (CRESTOR) 5 mg tablet, Take 5 mg by mouth daily, Disp: , Rfl:     Social History     Socioeconomic History   • Marital status: /Civil Union     Spouse name: Not on file   • Number of children: Not on file   • Years of education: Not on file   • Highest education level: Not on file   Occupational History   • Not on file   Tobacco Use   • Smoking status: Former     Types: Cigarettes "  • Smokeless tobacco: Never   Vaping Use   • Vaping Use: Never used   Substance and Sexual Activity   • Alcohol use: Not Currently   • Drug use: Not Currently   • Sexual activity: Not on file   Other Topics Concern   • Not on file   Social History Narrative   • Not on file     Social Determinants of Health     Financial Resource Strain: Not on file   Food Insecurity: No Food Insecurity   • Worried About Running Out of Food in the Last Year: Never true   • Ran Out of Food in the Last Year: Never true   Transportation Needs: No Transportation Needs   • Lack of Transportation (Medical): No   • Lack of Transportation (Non-Medical): No   Physical Activity: Not on file   Stress: Not on file   Social Connections: Not on file   Intimate Partner Violence: Not on file   Housing Stability: Unknown   • Unable to Pay for Housing in the Last Year: No   • Number of Places Lived in the Last Year: Not on file   • Unstable Housing in the Last Year: No       History reviewed  No pertinent family history  Physical Exam:    Vitals: Blood pressure 108/70, pulse 93, resp  rate 16, height 5' 11\" (1 803 m), weight (!) 162 kg (358 lb), SpO2 98 %  , Body mass index is 49 93 kg/m² ,   Wt Readings from Last 3 Encounters:   05/18/23 (!) 162 kg (358 lb)   04/20/23 (!) 163 kg (360 lb)   04/04/23 (!) 159 kg (350 lb)       Physical Exam  Constitutional:       General: He is not in acute distress  Appearance: Normal appearance  HENT:      Head: Normocephalic and atraumatic  Mouth/Throat:      Mouth: Mucous membranes are moist    Eyes:      General: No scleral icterus  Extraocular Movements: Extraocular movements intact  Cardiovascular:      Rate and Rhythm: Normal rate and regular rhythm  Pulses: Normal pulses  Heart sounds: S1 normal and S2 normal  No murmur heard  No friction rub  No gallop  Comments: Mildly elevated JVP    Bilateral lower extremity edema  Pulmonary:      Effort: Pulmonary effort is normal       " Breath sounds: Normal breath sounds  Abdominal:      General: There is no distension  Palpations: Abdomen is soft  Tenderness: There is no abdominal tenderness  There is no guarding or rebound  Musculoskeletal:         General: Normal range of motion  Cervical back: Neck supple  Skin:     General: Skin is warm and dry  Capillary Refill: Capillary refill takes less than 2 seconds  Neurological:      General: No focal deficit present  Mental Status: He is alert and oriented to person, place, and time  Psychiatric:         Mood and Affect: Mood normal          Labs & Results:    Lab Results   Component Value Date    WBC 8 84 01/11/2023    HGB 15 9 01/11/2023    HCT 51 9 (H) 01/11/2023    MCV 80 (L) 01/11/2023     01/11/2023     Lab Results   Component Value Date    SODIUM 141 04/29/2023    K 3 9 04/29/2023     04/29/2023    CO2 31 04/29/2023    BUN 26 (H) 04/29/2023    CREATININE 1 22 04/29/2023    GLUC 134 01/11/2023    CALCIUM 9 2 04/29/2023     Lab Results   Component Value Date    NTBNP 527 (H) 12/09/2022      No results found for: CHOLESTEROL  No results found for: HDL  No results found for: TRIG  No results found for: [facility]    EKG personally reviewed by Marta Kennedy MD      Counseling / Coordination of Care  Time spent today 25 minutes  Greater than 50% of total time was spent with the patient and / or family counseling and / or coordination of care  We discussed diagnoses, most recent studies and any changes in treatment    Thank you for the opportunity to participate in the care of this patient      Marium Deluna MD  ADVANCED HEART FAILURE AND MECHANICAL CIRCULATORY SUPPORT  [facility]

## 2023-05-18 NOTE — PATIENT INSTRUCTIONS
Decrease sildenafil to 20mg two times a day for 1 week then discontinue  We will repeat echocardiogram in 3 months to reassess heart function and pulmonary hypertension off of sildenafil  2g sodium diet  Continue rest of cardiac medications  Daily weights

## 2023-08-07 ENCOUNTER — HOSPITAL ENCOUNTER (OUTPATIENT)
Dept: NON INVASIVE DIAGNOSTICS | Facility: CLINIC | Age: 53
Discharge: HOME | End: 2023-08-07
Payer: COMMERCIAL

## 2023-08-07 VITALS
HEIGHT: 71 IN | HEART RATE: 105 BPM | SYSTOLIC BLOOD PRESSURE: 108 MMHG | BODY MASS INDEX: 44.1 KG/M2 | WEIGHT: 315 LBS | DIASTOLIC BLOOD PRESSURE: 70 MMHG

## 2023-08-07 DIAGNOSIS — I27.20 PULMONARY HYPERTENSION (HCC): ICD-10-CM

## 2023-08-07 LAB
AORTIC ROOT: 4.4 CM
APICAL FOUR CHAMBER EJECTION FRACTION: 62 %
FRACTIONAL SHORTENING: 37 (ref 28–44)
INTERVENTRICULAR SEPTUM IN DIASTOLE (PARASTERNAL SHORT AXIS VIEW): 1.1 CM
INTERVENTRICULAR SEPTUM: 1.1 CM (ref 0.6–1.1)
LAAS-AP4: 29.2 CM2
LEFT ATRIUM SIZE: 4.8 CM
LEFT INTERNAL DIMENSION IN SYSTOLE: 3.3 CM (ref 2.1–4)
LEFT VENTRICULAR INTERNAL DIMENSION IN DIASTOLE: 5.2 CM (ref 3.5–6)
LEFT VENTRICULAR POSTERIOR WALL IN END DIASTOLE: 1.5 CM
LEFT VENTRICULAR STROKE VOLUME: 85 ML
LVSV (TEICH): 85 ML
PA SYSTOLIC PRESSURE: 45 MMHG
RIGHT ATRIUM AREA SYSTOLE A4C: 28.3 CM2
RIGHT VENTRICLE ID DIMENSION: 4.8 CM
SL CV LV EF: 50
SL CV PED ECHO LEFT VENTRICLE DIASTOLIC VOLUME (MOD BIPLANE) 2D: 130 ML
SL CV PED ECHO LEFT VENTRICLE SYSTOLIC VOLUME (MOD BIPLANE) 2D: 44 ML
TR MAX PG: 38 MMHG
TR PEAK VELOCITY: 3.1 M/S
TRICUSPID VALVE PEAK REGURGITATION VELOCITY: 3.08 M/S

## 2023-08-07 PROCEDURE — 93308 TTE F-UP OR LMTD: CPT

## 2023-08-07 PROCEDURE — 93321 DOPPLER ECHO F-UP/LMTD STD: CPT

## 2023-08-07 PROCEDURE — 93321 DOPPLER ECHO F-UP/LMTD STD: CPT | Performed by: INTERNAL MEDICINE

## 2023-08-07 PROCEDURE — 93325 DOPPLER ECHO COLOR FLOW MAPG: CPT | Performed by: INTERNAL MEDICINE

## 2023-08-07 PROCEDURE — 93308 TTE F-UP OR LMTD: CPT | Performed by: INTERNAL MEDICINE

## 2023-08-07 PROCEDURE — 93325 DOPPLER ECHO COLOR FLOW MAPG: CPT

## 2023-08-10 ENCOUNTER — OFFICE VISIT (OUTPATIENT)
Dept: CARDIOLOGY CLINIC | Facility: CLINIC | Age: 53
End: 2023-08-10
Payer: COMMERCIAL

## 2023-08-10 VITALS
OXYGEN SATURATION: 91 % | HEART RATE: 83 BPM | WEIGHT: 315 LBS | BODY MASS INDEX: 44.1 KG/M2 | SYSTOLIC BLOOD PRESSURE: 123 MMHG | HEIGHT: 71 IN | DIASTOLIC BLOOD PRESSURE: 86 MMHG | RESPIRATION RATE: 16 BRPM

## 2023-08-10 DIAGNOSIS — G47.33 OSA (OBSTRUCTIVE SLEEP APNEA): ICD-10-CM

## 2023-08-10 DIAGNOSIS — I48.0 PAROXYSMAL ATRIAL FIBRILLATION (HCC): ICD-10-CM

## 2023-08-10 DIAGNOSIS — I10 HYPERTENSION, ESSENTIAL: ICD-10-CM

## 2023-08-10 DIAGNOSIS — I27.20 PULMONARY HYPERTENSION (HCC): ICD-10-CM

## 2023-08-10 DIAGNOSIS — I50.32 CHRONIC HEART FAILURE WITH PRESERVED EJECTION FRACTION (HCC): Primary | ICD-10-CM

## 2023-08-10 PROCEDURE — 99214 OFFICE O/P EST MOD 30 MIN: CPT | Performed by: INTERNAL MEDICINE

## 2023-08-10 RX ORDER — BUMETANIDE 2 MG/1
TABLET ORAL
Qty: 90 TABLET | Refills: 1
Start: 2023-08-10

## 2023-08-10 RX ORDER — LOSARTAN POTASSIUM 50 MG/1
50 TABLET ORAL
COMMUNITY
Start: 2023-07-06 | End: 2024-07-05

## 2023-08-10 RX ORDER — BUMETANIDE 2 MG/1
TABLET ORAL
COMMUNITY
Start: 2023-07-07 | End: 2023-08-10 | Stop reason: SDUPTHER

## 2023-08-10 NOTE — PROGRESS NOTES
Advanced Heart Failure Outpatient Progress Note - Jadiel Deras 46 y.o. male MRN: [id number]    Encounter: [id number]      Assessment/Plan:    Patient Active Problem List    Diagnosis Date Noted   • CHF (congestive heart failure) ([address]) 12/01/2022   • Acute respiratory failure due to COVID-19 ([address]) 12/01/2022   • Elevated serum creatinine 12/01/2022   • COPD mixed type ([address]) 11/07/2022   • Obstructive sleep apnea 12/22/2021   • Left ventricular hypertrophy 12/20/2021   • Pulmonary hypertension ([address]) 12/07/2021   • Impaired fasting glucose 06/24/2020   • Hypertension, essential 08/20/2017   • Mixed hyperlipidemia 08/20/2017   • Noncompliance with CPAP treatment 03/10/2014   • Morbid obesity ([address]) 02/05/2013     # Pulmonary hypertension, NYHA II  RV dilated with preserved systolic function  Etiology: likely group III disease with history of chronic hypoxia and sleep apnea, group II with HFPEF. Recently with COVID pneumonia likely contributing. Reportedly maintained on sildenafil 20mg TID but patient does not recall taking this before hospitalization 12/2022. Last echo prior to hospitalization (12/2021 [facility]) reported normal RV size and systolic function and no pulmonary hypertension. Repeat echocardiogram 4/4/23 showed improvement in RV size and function and reduction in estimated pulmonary artery pressure. Echo 8/7/23 showed PASP of 45mmHg but patient off CPAP for about 1 month. Euvolemic to mildly volume up on exam, warm and well perfused   PH Rx: sildenafil 20mg TID - off  Diuretic: bumex 4mg in AM and 2mg in PM with potassium 20 meq daily    Weight: 342 lbs 1/25/23; 360lbs 4/20/23; 358 lbs 5/18/23; 364 lbs 8/10/23  NT proBNP: 527 12/9/22    Studies- personally reviewed by me    Echo 8/7/23: (off CPAP for a month)  LVEF 50%  RV mild to moderately dilated with mild to moderately reduced systolic function.    PASP 45mmHg    Echo 4/4/23:  LVEF 40-45%, mild concentric LVH, 50-55% on review  RV moderately dilated with normal systolic function  Estimated [id number], mild TR  Ascending aorta 4.1cm, 4.5cm at the root    Echocardiogram 12/15/22, limited:  LVEF: normal size and systolic function  RV severely dilated with normal systolic function, TAPSE 2.1  Poor TR envelope for estimation of PASP  RVOT, normal doppler, parabolic, no notching  IVC normal size and respirophasic variation    Echo limited 12/2/22:  LVEF normal  RV severely dilated, mildly reduced systolic function  Estimated PASP 51 assuming RAP of 10, IVC not well visualized    CTA Chest 12/1/22:  Enlarged main PA, 4.6cm  Subsegmental atelectasis versus pneumonia right lower lobe. No evidence of pulmonary embolism given limitations. [facility] studies:  Spirometry 11/4/22: "Moderate degree of restrictive lung disease noted suggesting cardiac   and/or chest wall and/or pulmonary parenchyma as the etiology .     No obstructive airways disease noted. No evidence of good bronchodilator   response noted. Lung volumes are suggestive of restrictive pattern. Diffusion is Moderately reduced indicating loss of alveolar-capillary   units and/or VQ mismatch ."   6MWT 11/4/22: "Based on six minute walk testing, patient qualifies for oxygen at 6 L/min."   Chest CT 12/26/21: No evidence of ILD  Echo 12/18/21: LVEF 60-65%. Normal RV size and systolic function.  No significant valve disease    # Chronic hypoxic respiratory failure, did not qualify for home oxygen on discharge 12/2022  # H/o pneumonia/COVID, s/p IV Decadron remdesivir and baricitinib 12/2022  # Atrial fibrillation, paroxysmal  Rate: metoprolol succinate 50mg daily  AC: apixaban 5mg BID  # Severe BISHNU, was using CPAP until vacation to [facility], reports has been off for about a month  # Hypertension, controlled  # Mixed hyperlipidemia, 12/26/22 TG 85 LDL 91 HDL 64  Rx: rosuvastatin 5mg daily    Today's Plan:  Continue current medications  2g sodium diet  Daily weights  Follow up with pulmonary regarding resuming CPAP  Reassess pulmonary hypertension with repeat echocardiogram after 3 months of consistent CPAP use      HPI:   46year old male with PMH of hypertension, sleep apnea, COPD, morbid obesity, CHF and pulmonary hypertension who was admitted 12/1/22 for progressive shortness of breath for a few months with worsening over few days. He tested positive for COVID. Admitted for acute hypoxic respiratory failure due to COVID/CHF exacerbation. CT PE showed right lower lobe pneumonia. S/p IV Decadron remdesivir and baricitinib. He was diuresed and diuresed with IV lasix then transitioned to bumex 2mg BID. He was continued on home sildenafil at 20mg TID. Seen at the ED 1/11/23 for afib with RVR. Given IV lopressor with improved rate control. Also noted to have LINDEN and bumex dose decreased. Patient reports he does not recall being on sildenafil prior to hospitalization 12/2022 at that the medication was only started while he was in the hospital. He reports taking bumex and potassium only as needed for weight gain. Denies PND, orthopnea or lower extremity edema. No chest pain or palpitations. He is able to walk about 1 mile and 1 flight of stairs without symptoms. No lightheadedness, syncope or near syncope. 4/20/23: here for follow up. Noted increased in weight and swelling. PCP increased bumex to 4mg BID and potassium to 10 meq BID. He reports drinking about 6 L of fluid a day. Tries to limit his salt intake. No adherent with CPAP use. Repeat echocardiogram showed improvement in RV size and systolic function and reduction in pulmonary artery systolic pressure to 30 mmHg. 5/18/23: here for follow up. 4/29/23 Na 141 K 3.9 creatinine 1.22 from 1.16, Mg 2.5. He reports overall doing fine. Staying active. Still drinking a lot of fluids. Weight down about 3 lbs since last visit. Denies shortness of breath, no worsening leg swelling. Interval history:  8/10/23: Here for follow up.  Denies shortness of breath on current level on exertion. 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 Recent weight gain likely due to caloric intake, patient was recently on vacation in [facility]. Has not been using CPAP since he was away. Reports he was asked to return the CPAP since he was not using. Past Medical History:   Diagnosis Date   • CHF (congestive heart failure) (HCC)    • Hypertension    • Sleep apnea        Review of Systems   Constitutional: Negative for chills, fatigue, fever and unexpected weight change. HENT: Negative for ear pain and sore throat. Eyes: Negative for pain and visual disturbance. Respiratory: Negative for cough, chest tightness and shortness of breath. Cardiovascular: Negative for chest pain, palpitations and leg swelling. Gastrointestinal: Negative for abdominal distention, abdominal pain and vomiting. Endocrine: Negative. Genitourinary: Negative for dysuria and hematuria. Musculoskeletal: Negative for arthralgias and back pain. Skin: Negative for color change and rash. Allergic/Immunologic: Negative. Neurological: Negative for dizziness, seizures, syncope and light-headedness. Hematological: Negative. Psychiatric/Behavioral: Negative. All other systems reviewed and are negative. No Known Allergies  .     Current Outpatient Medications:   •  apixaban (ELIQUIS) 5 mg, Take 1 tablet (5 mg total) by mouth 2 (two) times a day, Disp: 60 tablet, Rfl: 5  •  bumetanide (BUMEX) 2 mg tablet, , Disp: , Rfl:   •  famotidine (PEPCID) 40 MG tablet, Take 40 mg by mouth, Disp: , Rfl:   •  losartan (COZAAR) 50 mg tablet, Take 50 mg by mouth, Disp: , Rfl:   •  Magnesium Oxide 400 MG CAPS, Take one table once daily for five days, Disp: 5 tablet, Rfl: 0  •  metoprolol succinate (TOPROL-XL) 50 mg 24 hr tablet, Take 1 tablet (50 mg total) by mouth daily, Disp: 30 tablet, Rfl: 0  •  potassium chloride (Klor-Con) 10 mEq tablet, Take 2 tablets (20 mEq total) by mouth daily, Disp: 120 tablet, Rfl: 11  •  rosuvastatin (CRESTOR) 5 mg tablet, Take 5 mg by mouth daily, Disp: , Rfl:   •  bumetanide (BUMEX) 1 mg tablet, Take 4 tablets (4 mg total) by mouth 2 (two) times a day (Patient not taking: Reported on 8/10/2023), Disp: 240 tablet, Rfl: 2  •  lisinopril-hydrochlorothiazide (PRINZIDE,ZESTORETIC) 10-12.5 MG per tablet, , Disp: , Rfl:     Social History     Socioeconomic History   • Marital status: /Civil Union     Spouse name: Not on file   • Number of children: Not on file   • Years of education: Not on file   • Highest education level: Not on file   Occupational History   • Not on file   Tobacco Use   • Smoking status: Former     Types: Cigarettes   • Smokeless tobacco: Never   Vaping Use   • Vaping Use: Never used   Substance and Sexual Activity   • Alcohol use: Not Currently   • Drug use: Not Currently   • Sexual activity: Not on file   Other Topics Concern   • Not on file   Social History Narrative   • Not on file     Social Determinants of Health     Financial Resource Strain: Not on file   Food Insecurity: No Food Insecurity (12/5/2022)    Hunger Vital Sign    • Worried About Running Out of Food in the Last Year: Never true    • Ran Out of Food in the Last Year: Never true   Transportation Needs: No Transportation Needs (12/5/2022)    PRAPARE - Transportation    • Lack of Transportation (Medical): No    • Lack of Transportation (Non-Medical): No   Physical Activity: Not on file   Stress: Not on file   Social Connections: Not on file   Intimate Partner Violence: Not on file   Housing Stability: Unknown (12/5/2022)    Housing Stability Vital Sign    • Unable to Pay for Housing in the Last Year: No    • Number of Places Lived in the Last Year: Not on file    • Unstable Housing in the Last Year: No       History reviewed. No pertinent family history. Physical Exam:    Vitals: Blood pressure 123/86, pulse 83, resp.  rate 16, height 5' 11" (1.803 m), weight (!) 165 kg (364 lb), SpO2 91 %., Body mass index is 50.77 kg/m².,   Wt Readings from Last 3 Encounters:   08/10/23 (!) 165 kg (364 lb)   08/07/23 (!) 162 kg (358 lb)   05/18/23 (!) 162 kg (358 lb)       Physical Exam  Constitutional:       General: He is not in acute distress. Appearance: Normal appearance. HENT:      Head: Normocephalic and atraumatic. Mouth/Throat:      Mouth: Mucous membranes are moist.   Eyes:      General: No scleral icterus. Extraocular Movements: Extraocular movements intact. Cardiovascular:      Rate and Rhythm: Normal rate and regular rhythm. Pulses: Normal pulses. Heart sounds: S1 normal and S2 normal. No murmur heard. No friction rub. No gallop. Comments: JVP at the clavicle sitting upright  Pulmonary:      Effort: Pulmonary effort is normal.      Breath sounds: Normal breath sounds. Abdominal:      General: There is no distension. Palpations: Abdomen is soft. Tenderness: There is no abdominal tenderness. There is no guarding or rebound. Musculoskeletal:         General: Normal range of motion. Cervical back: Neck supple. Right lower leg: No edema. Left lower leg: No edema. Skin:     General: Skin is warm and dry. Capillary Refill: Capillary refill takes less than 2 seconds. Neurological:      General: No focal deficit present. Mental Status: He is alert and oriented to person, place, and time.    Psychiatric:         Mood and Affect: Mood normal.         Labs & Results:    Lab Results   Component Value Date    WBC 8.84 01/11/2023    HGB 15.9 01/11/2023    HCT 51.9 (H) 01/11/2023    MCV 80 (L) 01/11/2023     01/11/2023     Lab Results   Component Value Date    SODIUM 141 04/29/2023    K 3.9 04/29/2023     04/29/2023    CO2 31 04/29/2023    BUN 26 (H) 04/29/2023    CREATININE 1.22 04/29/2023    GLUC 134 01/11/2023    CALCIUM 9.2 04/29/2023     Lab Results   Component Value Date    NTBNP 527 (H) 12/09/2022      No results found for: "CHOLESTEROL"  No results found for: "HDL"  No results found for: "TRIG"  No results found for: "[address]"    EKG personally reviewed by Areli Vallecillo MD.     Counseling / Coordination of Care  Time spent today 25 minutes. Greater than 50% of total time was spent with the patient and / or family counseling and / or coordination of care. We discussed diagnoses, most recent studies and any changes in treatment    Thank you for the opportunity to participate in the care of this patient.     Areli Vallecillo MD  ADVANCED HEART FAILURE AND MECHANICAL CIRCULATORY SUPPORT  [address]

## 2023-08-10 NOTE — PATIENT INSTRUCTIONS
Continue current medications  2g sodium diet  Daily weights  Follow up with pulmonary regarding resuming CPAP

## 2024-11-13 NOTE — RESPIRATORY THERAPY NOTE
12/12/22 1338   Respiratory Assessment   Resp Comments Pt remains on [address]  Liter flow decreased in an attempt to wean  Will cont to wean as able     O2 Device MFNC   Oxygen Therapy/Pulse Ox   O2 Device Mid flow nasal cannula   O2 Therapy Oxygen humidified   Nasal Cannula O2 Flow Rate (L/min) (S)  12 L/min   Calculated FIO2 (%) - Nasal Cannula 68   SpO2 93 %   SpO2 Activity At Rest   $ Pulse Oximetry Spot Check Charge Completed The chest was prepped. The site was prepped with ChloraPrep. The site was clipped. The patient was draped.